# Patient Record
Sex: FEMALE | Race: BLACK OR AFRICAN AMERICAN | Employment: OTHER | URBAN - METROPOLITAN AREA
[De-identification: names, ages, dates, MRNs, and addresses within clinical notes are randomized per-mention and may not be internally consistent; named-entity substitution may affect disease eponyms.]

---

## 2018-01-01 ENCOUNTER — HOME CARE VISIT (OUTPATIENT)
Dept: HOSPICE | Facility: HOSPICE | Age: 83
End: 2018-01-01
Payer: MEDICARE

## 2018-01-01 ENCOUNTER — HOSPITAL ENCOUNTER (INPATIENT)
Age: 83
LOS: 5 days | Discharge: HOSPICE | DRG: 064 | End: 2018-10-20
Attending: EMERGENCY MEDICINE | Admitting: HOSPITALIST
Payer: MEDICARE

## 2018-01-01 ENCOUNTER — APPOINTMENT (OUTPATIENT)
Dept: CT IMAGING | Age: 83
DRG: 064 | End: 2018-01-01
Attending: PSYCHIATRY & NEUROLOGY
Payer: MEDICARE

## 2018-01-01 ENCOUNTER — APPOINTMENT (OUTPATIENT)
Dept: CT IMAGING | Age: 83
DRG: 329 | End: 2018-01-01
Attending: PSYCHIATRY & NEUROLOGY
Payer: MEDICARE

## 2018-01-01 ENCOUNTER — APPOINTMENT (OUTPATIENT)
Dept: CT IMAGING | Age: 83
DRG: 329 | End: 2018-01-01
Attending: INTERNAL MEDICINE
Payer: MEDICARE

## 2018-01-01 ENCOUNTER — APPOINTMENT (OUTPATIENT)
Dept: MRI IMAGING | Age: 83
DRG: 064 | End: 2018-01-01
Attending: PSYCHIATRY & NEUROLOGY
Payer: MEDICARE

## 2018-01-01 ENCOUNTER — ANESTHESIA EVENT (OUTPATIENT)
Dept: SURGERY | Age: 83
DRG: 329 | End: 2018-01-01
Payer: MEDICARE

## 2018-01-01 ENCOUNTER — APPOINTMENT (OUTPATIENT)
Dept: VASCULAR SURGERY | Age: 83
DRG: 064 | End: 2018-01-01
Attending: HOSPITALIST
Payer: MEDICARE

## 2018-01-01 ENCOUNTER — HOME CARE VISIT (OUTPATIENT)
Dept: SCHEDULING | Facility: HOME HEALTH | Age: 83
End: 2018-01-01
Payer: MEDICARE

## 2018-01-01 ENCOUNTER — APPOINTMENT (OUTPATIENT)
Dept: NON INVASIVE DIAGNOSTICS | Age: 83
DRG: 329 | End: 2018-01-01
Attending: INTERNAL MEDICINE
Payer: MEDICARE

## 2018-01-01 ENCOUNTER — HOSPITAL ENCOUNTER (INPATIENT)
Age: 83
LOS: 12 days | Discharge: NURSING, REHAB OR LONG-TERM CARE | DRG: 329 | End: 2018-10-12
Attending: EMERGENCY MEDICINE | Admitting: FAMILY MEDICINE
Payer: MEDICARE

## 2018-01-01 ENCOUNTER — APPOINTMENT (OUTPATIENT)
Dept: NUCLEAR MEDICINE | Age: 83
DRG: 329 | End: 2018-01-01
Attending: INTERNAL MEDICINE
Payer: MEDICARE

## 2018-01-01 ENCOUNTER — HOSPICE ADMISSION (OUTPATIENT)
Dept: HOSPICE | Facility: HOSPICE | Age: 83
End: 2018-01-01
Payer: MEDICARE

## 2018-01-01 ENCOUNTER — APPOINTMENT (OUTPATIENT)
Dept: CT IMAGING | Age: 83
DRG: 329 | End: 2018-01-01
Attending: PHYSICIAN ASSISTANT
Payer: MEDICARE

## 2018-01-01 ENCOUNTER — APPOINTMENT (OUTPATIENT)
Dept: GENERAL RADIOLOGY | Age: 83
DRG: 329 | End: 2018-01-01
Attending: INTERNAL MEDICINE
Payer: MEDICARE

## 2018-01-01 ENCOUNTER — APPOINTMENT (OUTPATIENT)
Dept: CT IMAGING | Age: 83
DRG: 064 | End: 2018-01-01
Attending: PHYSICIAN ASSISTANT
Payer: MEDICARE

## 2018-01-01 ENCOUNTER — APPOINTMENT (OUTPATIENT)
Dept: GENERAL RADIOLOGY | Age: 83
DRG: 064 | End: 2018-01-01
Attending: EMERGENCY MEDICINE
Payer: MEDICARE

## 2018-01-01 ENCOUNTER — APPOINTMENT (OUTPATIENT)
Dept: MRI IMAGING | Age: 83
DRG: 329 | End: 2018-01-01
Attending: PSYCHIATRY & NEUROLOGY
Payer: MEDICARE

## 2018-01-01 ENCOUNTER — ANESTHESIA (OUTPATIENT)
Dept: SURGERY | Age: 83
DRG: 329 | End: 2018-01-01
Payer: MEDICARE

## 2018-01-01 ENCOUNTER — APPOINTMENT (OUTPATIENT)
Dept: GENERAL RADIOLOGY | Age: 83
DRG: 329 | End: 2018-01-01
Attending: EMERGENCY MEDICINE
Payer: MEDICARE

## 2018-01-01 ENCOUNTER — APPOINTMENT (OUTPATIENT)
Dept: NON INVASIVE DIAGNOSTICS | Age: 83
DRG: 329 | End: 2018-01-01
Attending: PSYCHIATRY & NEUROLOGY
Payer: MEDICARE

## 2018-01-01 ENCOUNTER — HOSPITAL ENCOUNTER (EMERGENCY)
Dept: LAB | Age: 83
Discharge: HOME | DRG: 064 | End: 2018-10-15
Payer: MEDICARE

## 2018-01-01 ENCOUNTER — APPOINTMENT (OUTPATIENT)
Dept: CT IMAGING | Age: 83
DRG: 064 | End: 2018-01-01
Attending: EMERGENCY MEDICINE
Payer: MEDICARE

## 2018-01-01 ENCOUNTER — APPOINTMENT (OUTPATIENT)
Dept: NON INVASIVE DIAGNOSTICS | Age: 83
DRG: 064 | End: 2018-01-01
Attending: HOSPITALIST
Payer: MEDICARE

## 2018-01-01 VITALS
DIASTOLIC BLOOD PRESSURE: 64 MMHG | TEMPERATURE: 97.8 F | HEIGHT: 64 IN | WEIGHT: 215 LBS | OXYGEN SATURATION: 95 % | RESPIRATION RATE: 18 BRPM | BODY MASS INDEX: 36.7 KG/M2 | SYSTOLIC BLOOD PRESSURE: 95 MMHG | HEART RATE: 94 BPM

## 2018-01-01 VITALS
BODY MASS INDEX: 37.58 KG/M2 | DIASTOLIC BLOOD PRESSURE: 63 MMHG | HEART RATE: 86 BPM | WEIGHT: 204.2 LBS | RESPIRATION RATE: 16 BRPM | HEIGHT: 62 IN | SYSTOLIC BLOOD PRESSURE: 105 MMHG | TEMPERATURE: 99.1 F | OXYGEN SATURATION: 100 %

## 2018-01-01 VITALS
DIASTOLIC BLOOD PRESSURE: 72 MMHG | HEART RATE: 91 BPM | OXYGEN SATURATION: 84 % | SYSTOLIC BLOOD PRESSURE: 124 MMHG | RESPIRATION RATE: 28 BRPM

## 2018-01-01 VITALS
HEART RATE: 95 BPM | OXYGEN SATURATION: 99 % | RESPIRATION RATE: 16 BRPM | DIASTOLIC BLOOD PRESSURE: 61 MMHG | BODY MASS INDEX: 37.42 KG/M2 | HEIGHT: 63 IN | TEMPERATURE: 98.2 F | WEIGHT: 211.2 LBS | SYSTOLIC BLOOD PRESSURE: 107 MMHG

## 2018-01-01 VITALS — HEART RATE: 94 BPM | OXYGEN SATURATION: 84 % | RESPIRATION RATE: 22 BRPM | TEMPERATURE: 98.4 F

## 2018-01-01 VITALS
HEART RATE: 126 BPM | DIASTOLIC BLOOD PRESSURE: 68 MMHG | RESPIRATION RATE: 28 BRPM | SYSTOLIC BLOOD PRESSURE: 100 MMHG | OXYGEN SATURATION: 69 %

## 2018-01-01 VITALS
HEART RATE: 134 BPM | OXYGEN SATURATION: 67 % | DIASTOLIC BLOOD PRESSURE: 70 MMHG | TEMPERATURE: 97.5 F | SYSTOLIC BLOOD PRESSURE: 95 MMHG | RESPIRATION RATE: 25 BRPM

## 2018-01-01 DIAGNOSIS — I63.9 CEREBROVASCULAR ACCIDENT (CVA), UNSPECIFIED MECHANISM (HCC): Primary | ICD-10-CM

## 2018-01-01 DIAGNOSIS — R53.1 WEAKNESS: ICD-10-CM

## 2018-01-01 DIAGNOSIS — K63.89 COLONIC MASS: ICD-10-CM

## 2018-01-01 DIAGNOSIS — R47.01 APHASIA: ICD-10-CM

## 2018-01-01 LAB
ABO + RH BLD: NORMAL
ABO + RH BLD: NORMAL
ALBUMIN SERPL-MCNC: 2.2 G/DL (ref 3.4–5)
ALBUMIN SERPL-MCNC: 2.5 G/DL (ref 3.4–5)
ALBUMIN SERPL-MCNC: 2.6 G/DL (ref 3.4–5)
ALBUMIN SERPL-MCNC: 2.8 G/DL (ref 3.4–5)
ALBUMIN SERPL-MCNC: 3.5 G/DL (ref 3.4–5)
ALBUMIN/GLOB SERPL: 0.7 {RATIO} (ref 0.8–1.7)
ALBUMIN/GLOB SERPL: 0.8 {RATIO} (ref 0.8–1.7)
ALBUMIN/GLOB SERPL: 0.9 {RATIO} (ref 0.8–1.7)
ALP SERPL-CCNC: 43 U/L (ref 45–117)
ALP SERPL-CCNC: 47 U/L (ref 45–117)
ALP SERPL-CCNC: 49 U/L (ref 45–117)
ALP SERPL-CCNC: 53 U/L (ref 45–117)
ALP SERPL-CCNC: 61 U/L (ref 45–117)
ALT SERPL-CCNC: 14 U/L (ref 13–56)
ALT SERPL-CCNC: 14 U/L (ref 13–56)
ALT SERPL-CCNC: 16 U/L (ref 13–56)
ALT SERPL-CCNC: 21 U/L (ref 13–56)
ALT SERPL-CCNC: 22 U/L (ref 13–56)
ANION GAP SERPL CALC-SCNC: 10 MMOL/L (ref 3–18)
ANION GAP SERPL CALC-SCNC: 3 MMOL/L (ref 3–18)
ANION GAP SERPL CALC-SCNC: 5 MMOL/L (ref 3–18)
ANION GAP SERPL CALC-SCNC: 5 MMOL/L (ref 3–18)
ANION GAP SERPL CALC-SCNC: 6 MMOL/L (ref 3–18)
ANION GAP SERPL CALC-SCNC: 7 MMOL/L (ref 3–18)
ANION GAP SERPL CALC-SCNC: 8 MMOL/L (ref 3–18)
ANION GAP SERPL CALC-SCNC: 8 MMOL/L (ref 3–18)
APPEARANCE UR: CLEAR
APPEARANCE UR: CLEAR
APTT PPP: 130.6 SEC (ref 23–36.4)
APTT PPP: 30.1 SEC (ref 23–36.4)
APTT PPP: 35.4 SEC (ref 23–36.4)
APTT PPP: 78.3 SEC (ref 23–36.4)
APTT PPP: >180 SEC (ref 23–36.4)
ARTERIAL PATENCY WRIST A: ABNORMAL
ARTERIAL PATENCY WRIST A: YES
AST SERPL-CCNC: 10 U/L (ref 15–37)
AST SERPL-CCNC: 11 U/L (ref 15–37)
AST SERPL-CCNC: 26 U/L (ref 15–37)
AST SERPL-CCNC: 30 U/L (ref 15–37)
AST SERPL-CCNC: 9 U/L (ref 15–37)
BACTERIA SPEC CULT: NORMAL
BACTERIA URNS QL MICRO: ABNORMAL /HPF
BASE EXCESS BLD CALC-SCNC: 5 MMOL/L
BASE EXCESS BLD CALC-SCNC: 6 MMOL/L
BASOPHILS # BLD: 0 K/UL (ref 0–0.1)
BASOPHILS # BLD: 0.1 K/UL (ref 0–0.1)
BASOPHILS NFR BLD: 0 % (ref 0–2)
BASOPHILS NFR BLD: 1 % (ref 0–3)
BDY SITE: ABNORMAL
BDY SITE: ABNORMAL
BILIRUB SERPL-MCNC: 0.3 MG/DL (ref 0.2–1)
BILIRUB SERPL-MCNC: 0.4 MG/DL (ref 0.2–1)
BILIRUB SERPL-MCNC: 0.5 MG/DL (ref 0.2–1)
BILIRUB SERPL-MCNC: 0.9 MG/DL (ref 0.2–1)
BILIRUB SERPL-MCNC: 1.4 MG/DL (ref 0.2–1)
BILIRUB UR QL: NEGATIVE
BILIRUB UR QL: NEGATIVE
BLD PROD TYP BPU: NORMAL
BLOOD GROUP ANTIBODIES SERPL: NORMAL
BLOOD GROUP ANTIBODIES SERPL: NORMAL
BODY TEMPERATURE: 97
BODY TEMPERATURE: 98.4
BPU ID: NORMAL
BUN SERPL-MCNC: 10 MG/DL (ref 7–18)
BUN SERPL-MCNC: 10 MG/DL (ref 7–18)
BUN SERPL-MCNC: 11 MG/DL (ref 7–18)
BUN SERPL-MCNC: 12 MG/DL (ref 7–18)
BUN SERPL-MCNC: 14 MG/DL (ref 7–18)
BUN SERPL-MCNC: 14 MG/DL (ref 7–18)
BUN SERPL-MCNC: 16 MG/DL (ref 7–18)
BUN SERPL-MCNC: 17 MG/DL (ref 7–18)
BUN SERPL-MCNC: 20 MG/DL (ref 7–18)
BUN SERPL-MCNC: 22 MG/DL (ref 7–18)
BUN SERPL-MCNC: 23 MG/DL (ref 7–18)
BUN SERPL-MCNC: 8 MG/DL (ref 7–18)
BUN SERPL-MCNC: 9 MG/DL (ref 7–18)
BUN SERPL-MCNC: 9 MG/DL (ref 7–18)
BUN/CREAT SERPL: 10 (ref 12–20)
BUN/CREAT SERPL: 11 (ref 12–20)
BUN/CREAT SERPL: 12 (ref 12–20)
BUN/CREAT SERPL: 13 (ref 12–20)
BUN/CREAT SERPL: 14 (ref 12–20)
BUN/CREAT SERPL: 15 (ref 12–20)
BUN/CREAT SERPL: 7 (ref 12–20)
BUN/CREAT SERPL: 8 (ref 12–20)
BUN/CREAT SERPL: 9 (ref 12–20)
BUN/CREAT SERPL: 9 (ref 12–20)
CALCIUM SERPL-MCNC: 8.1 MG/DL (ref 8.5–10.1)
CALCIUM SERPL-MCNC: 8.2 MG/DL (ref 8.5–10.1)
CALCIUM SERPL-MCNC: 8.3 MG/DL (ref 8.5–10.1)
CALCIUM SERPL-MCNC: 8.4 MG/DL (ref 8.5–10.1)
CALCIUM SERPL-MCNC: 8.5 MG/DL (ref 8.5–10.1)
CALCIUM SERPL-MCNC: 8.8 MG/DL (ref 8.5–10.1)
CALCIUM SERPL-MCNC: 8.9 MG/DL (ref 8.5–10.1)
CALLED TO:,BCALL1: NORMAL
CALLED TO:,BCALL1: NORMAL
CEA SERPL-MCNC: 2 NG/ML
CHLORIDE SERPL-SCNC: 104 MMOL/L (ref 100–108)
CHLORIDE SERPL-SCNC: 105 MMOL/L (ref 100–108)
CHLORIDE SERPL-SCNC: 105 MMOL/L (ref 100–108)
CHLORIDE SERPL-SCNC: 106 MMOL/L (ref 100–108)
CHLORIDE SERPL-SCNC: 107 MMOL/L (ref 100–108)
CHLORIDE SERPL-SCNC: 108 MMOL/L (ref 100–108)
CHLORIDE SERPL-SCNC: 110 MMOL/L (ref 100–108)
CHLORIDE SERPL-SCNC: 110 MMOL/L (ref 100–108)
CHLORIDE SERPL-SCNC: 111 MMOL/L (ref 100–108)
CHLORIDE SERPL-SCNC: 111 MMOL/L (ref 100–108)
CHOLEST SERPL-MCNC: 106 MG/DL
CHOLEST SERPL-MCNC: 175 MG/DL
CK MB CFR SERPL CALC: NORMAL % (ref 0–4)
CK MB CFR SERPL CALC: NORMAL % (ref 0–4)
CK MB SERPL-MCNC: <1 NG/ML (ref 5–25)
CK MB SERPL-MCNC: <1 NG/ML (ref 5–25)
CK SERPL-CCNC: 37 U/L (ref 26–192)
CK SERPL-CCNC: 58 U/L (ref 26–192)
CO2 SERPL-SCNC: 23 MMOL/L (ref 21–32)
CO2 SERPL-SCNC: 26 MMOL/L (ref 21–32)
CO2 SERPL-SCNC: 27 MMOL/L (ref 21–32)
CO2 SERPL-SCNC: 28 MMOL/L (ref 21–32)
CO2 SERPL-SCNC: 29 MMOL/L (ref 21–32)
CO2 SERPL-SCNC: 29 MMOL/L (ref 21–32)
CO2 SERPL-SCNC: 30 MMOL/L (ref 21–32)
CO2 SERPL-SCNC: 31 MMOL/L (ref 21–32)
CO2 SERPL-SCNC: 31 MMOL/L (ref 21–32)
CO2 SERPL-SCNC: 32 MMOL/L (ref 21–32)
CO2 SERPL-SCNC: 33 MMOL/L (ref 21–32)
COLOR UR: YELLOW
COLOR UR: YELLOW
CREAT SERPL-MCNC: 1.08 MG/DL (ref 0.6–1.3)
CREAT SERPL-MCNC: 1.12 MG/DL (ref 0.6–1.3)
CREAT SERPL-MCNC: 1.12 MG/DL (ref 0.6–1.3)
CREAT SERPL-MCNC: 1.14 MG/DL (ref 0.6–1.3)
CREAT SERPL-MCNC: 1.15 MG/DL (ref 0.6–1.3)
CREAT SERPL-MCNC: 1.19 MG/DL (ref 0.6–1.3)
CREAT SERPL-MCNC: 1.21 MG/DL (ref 0.6–1.3)
CREAT SERPL-MCNC: 1.23 MG/DL (ref 0.6–1.3)
CREAT SERPL-MCNC: 1.26 MG/DL (ref 0.6–1.3)
CREAT SERPL-MCNC: 1.29 MG/DL (ref 0.6–1.3)
CREAT SERPL-MCNC: 1.33 MG/DL (ref 0.6–1.3)
CREAT SERPL-MCNC: 1.35 MG/DL (ref 0.6–1.3)
CREAT SERPL-MCNC: 1.4 MG/DL (ref 0.6–1.3)
CREAT SERPL-MCNC: 1.41 MG/DL (ref 0.6–1.3)
CREAT SERPL-MCNC: 1.51 MG/DL (ref 0.6–1.3)
CREAT SERPL-MCNC: 1.63 MG/DL (ref 0.6–1.3)
CROSSMATCH RESULT,%XM: NORMAL
DIFFERENTIAL METHOD BLD: ABNORMAL
ECHO AO ARCH DIAM: 3.25 CM
ECHO AO ARCH DIAM: 3.62 CM
ECHO AO ASC DIAM: 3.08 CM
ECHO AO ASC DIAM: 3.41 CM
ECHO AO ROOT DIAM: 3.2 CM
ECHO AO ROOT DIAM: 3.55 CM
ECHO AV AREA PEAK VELOCITY: 1.9 CM2
ECHO AV AREA PEAK VELOCITY: 2.2 CM2
ECHO AV AREA VTI: 2 CM2
ECHO AV AREA VTI: 2.2 CM2
ECHO AV AREA/BSA PEAK VELOCITY: 0.9 CM2/M2
ECHO AV AREA/BSA PEAK VELOCITY: 1.1 CM2/M2
ECHO AV AREA/BSA VTI: 1 CM2/M2
ECHO AV AREA/BSA VTI: 1.1 CM2/M2
ECHO AV MEAN GRADIENT: 11 MMHG
ECHO AV MEAN GRADIENT: 8.7 MMHG
ECHO AV PEAK GRADIENT: 17.1 MMHG
ECHO AV PEAK GRADIENT: 20.1 MMHG
ECHO AV PEAK VELOCITY: 206.84 CM/S
ECHO AV PEAK VELOCITY: 224.1 CM/S
ECHO AV VTI: 32.71 CM
ECHO AV VTI: 41.42 CM
ECHO IVC PROX: 2 CM
ECHO IVC PROX: 2.8 CM
ECHO LA MAJOR AXIS: 3.25 CM
ECHO LA MAJOR AXIS: 3.67 CM
ECHO LA VOL 2C: 27.87 ML (ref 22–52)
ECHO LA VOL 2C: 45.24 ML (ref 22–52)
ECHO LA VOL 4C: 44.02 ML (ref 22–52)
ECHO LA VOL 4C: 49.66 ML (ref 22–52)
ECHO LA VOL BP: 39.88 ML (ref 22–52)
ECHO LA VOL BP: 47.08 ML (ref 22–52)
ECHO LA VOL/BSA BIPLANE: 20.43 ML/M2 (ref 16–28)
ECHO LA VOL/BSA BIPLANE: 23.66 ML/M2
ECHO LA VOLUME INDEX A2C: 14.28 ML/M2 (ref 16–28)
ECHO LA VOLUME INDEX A2C: 22.73 ML/M2
ECHO LA VOLUME INDEX A4C: 22.12 ML/M2
ECHO LA VOLUME INDEX A4C: 25.45 ML/M2 (ref 16–28)
ECHO LV E' LATERAL VELOCITY: 0.7 CM/S
ECHO LV E' LATERAL VELOCITY: 1 CM/S
ECHO LV E' SEPTAL VELOCITY: 0.7 CM/S
ECHO LV E' SEPTAL VELOCITY: 0.7 CM/S
ECHO LV EDV A2C: 44 ML
ECHO LV EDV A2C: 84 ML
ECHO LV EDV A4C: 73.4 ML
ECHO LV EDV A4C: 78.5 ML
ECHO LV EDV BP: 61.5 ML (ref 56–104)
ECHO LV EDV BP: 79.1 ML (ref 56–104)
ECHO LV EDV INDEX A4C: 36.9 ML/M2
ECHO LV EDV INDEX A4C: 40.2 ML/M2
ECHO LV EDV INDEX BP: 31.5 ML/M2
ECHO LV EDV INDEX BP: 39.7 ML/M2
ECHO LV EDV NDEX A2C: 22.5 ML/M2
ECHO LV EDV NDEX A2C: 42.2 ML/M2
ECHO LV EJECTION FRACTION A2C: 68 %
ECHO LV EJECTION FRACTION A2C: 75 %
ECHO LV EJECTION FRACTION A4C: 57 %
ECHO LV EJECTION FRACTION A4C: 62 %
ECHO LV EJECTION FRACTION BIPLANE: 60 % (ref 55–100)
ECHO LV EJECTION FRACTION BIPLANE: 69.4 % (ref 55–100)
ECHO LV ESV A2C: 14.1 ML
ECHO LV ESV A2C: 21.3 ML
ECHO LV ESV A4C: 27.6 ML
ECHO LV ESV A4C: 33.7 ML
ECHO LV ESV BP: 24.2 ML (ref 19–49)
ECHO LV ESV BP: 24.6 ML (ref 19–49)
ECHO LV ESV INDEX A2C: 10.7 ML/M2
ECHO LV ESV INDEX A2C: 7.2 ML/M2
ECHO LV ESV INDEX A4C: 13.9 ML/M2
ECHO LV ESV INDEX A4C: 17.3 ML/M2
ECHO LV ESV INDEX BP: 12.2 ML/M2
ECHO LV ESV INDEX BP: 12.6 ML/M2
ECHO LV INTERNAL DIMENSION DIASTOLIC: 3.51 CM (ref 3.9–5.3)
ECHO LV INTERNAL DIMENSION DIASTOLIC: 4.13 CM (ref 3.9–5.3)
ECHO LV INTERNAL DIMENSION SYSTOLIC: 2.51 CM
ECHO LV INTERNAL DIMENSION SYSTOLIC: 2.68 CM
ECHO LV IVSD: 1.04 CM (ref 0.6–0.9)
ECHO LV IVSD: 1.17 CM (ref 0.6–0.9)
ECHO LV MASS 2D: 150.3 G (ref 67–162)
ECHO LV MASS 2D: 162.5 G (ref 67–162)
ECHO LV MASS INDEX 2D: 75.5 G/M2
ECHO LV MASS INDEX 2D: 83.3 G/M2 (ref 43–95)
ECHO LV POSTERIOR WALL DIASTOLIC: 1.04 CM (ref 0.6–0.9)
ECHO LV POSTERIOR WALL DIASTOLIC: 1.17 CM (ref 0.6–0.9)
ECHO LVOT DIAM: 2.02 CM
ECHO LVOT DIAM: 2.1 CM
ECHO LVOT PEAK GRADIENT: 6.2 MMHG
ECHO LVOT PEAK GRADIENT: 8.3 MMHG
ECHO LVOT PEAK VELOCITY: 124.66 CM/S
ECHO LVOT PEAK VELOCITY: 144.34 CM/S
ECHO LVOT VTI: 20.53 CM
ECHO LVOT VTI: 26.22 CM
ECHO MV A VELOCITY: 117.12 CM/S
ECHO MV A VELOCITY: 99.32 CM/S
ECHO MV AREA PHT: 2.5 CM2
ECHO MV AREA PHT: 2.6 CM2
ECHO MV E DECELERATION TIME (DT): 287.4 MS
ECHO MV E DECELERATION TIME (DT): 302.4 MS
ECHO MV E VELOCITY: 0.64 CM/S
ECHO MV E VELOCITY: 0.87 CM/S
ECHO MV E/A RATIO: 0.01
ECHO MV E/A RATIO: 0.01
ECHO MV E/E' LATERAL: 0.64
ECHO MV E/E' LATERAL: 1.24
ECHO MV E/E' RATIO (AVERAGED): 0.78
ECHO MV E/E' RATIO (AVERAGED): 1.24
ECHO MV E/E' SEPTAL: 0.91
ECHO MV E/E' SEPTAL: 1.24
ECHO MV PRESSURE HALF TIME (PHT): 83.4 MS
ECHO MV PRESSURE HALF TIME (PHT): 87.7 MS
ECHO PULMONARY ARTERY SYSTOLIC PRESSURE (PASP): 64 MMHG
ECHO RA AREA 4C: 11.39 CM2
ECHO RA AREA 4C: 8.2 CM2
ECHO RV INTERNAL DIMENSION: 4.07 CM
ECHO RV INTERNAL DIMENSION: 4.28 CM
ECHO RV TAPSE: 2 CM (ref 1.5–2)
ECHO RV TAPSE: 2.2 CM (ref 1.5–2)
ECHO TV REGURGITANT MAX VELOCITY: 282.29 CM/S
ECHO TV REGURGITANT MAX VELOCITY: 384.89 CM/S
ECHO TV REGURGITANT PEAK GRADIENT: 31.9 MMHG
ECHO TV REGURGITANT PEAK GRADIENT: 59.3 MMHG
EOSINOPHIL # BLD: 0 K/UL (ref 0–0.4)
EOSINOPHIL # BLD: 0.1 K/UL (ref 0–0.4)
EOSINOPHIL # BLD: 0.2 K/UL (ref 0–0.4)
EOSINOPHIL NFR BLD: 0 % (ref 0–5)
EOSINOPHIL NFR BLD: 1 % (ref 0–5)
EOSINOPHIL NFR BLD: 1 % (ref 0–5)
EOSINOPHIL NFR BLD: 2 % (ref 0–5)
EOSINOPHIL NFR BLD: 3 % (ref 0–5)
EOSINOPHIL NFR BLD: 4 % (ref 0–5)
EOSINOPHIL NFR BLD: 4 % (ref 0–5)
EPITH CASTS URNS QL MICRO: ABNORMAL /LPF (ref 0–5)
ERYTHROCYTE [DISTWIDTH] IN BLOOD BY AUTOMATED COUNT: 21.4 % (ref 11.6–14.5)
ERYTHROCYTE [DISTWIDTH] IN BLOOD BY AUTOMATED COUNT: 24.9 % (ref 11.6–14.5)
ERYTHROCYTE [DISTWIDTH] IN BLOOD BY AUTOMATED COUNT: 25.7 % (ref 11.6–14.5)
ERYTHROCYTE [DISTWIDTH] IN BLOOD BY AUTOMATED COUNT: 25.9 % (ref 11.6–14.5)
ERYTHROCYTE [DISTWIDTH] IN BLOOD BY AUTOMATED COUNT: 26.1 % (ref 11.6–14.5)
ERYTHROCYTE [DISTWIDTH] IN BLOOD BY AUTOMATED COUNT: 26.2 % (ref 11.6–14.5)
ERYTHROCYTE [DISTWIDTH] IN BLOOD BY AUTOMATED COUNT: 26.8 % (ref 11.6–14.5)
ERYTHROCYTE [DISTWIDTH] IN BLOOD BY AUTOMATED COUNT: 27.1 % (ref 11.6–14.5)
ERYTHROCYTE [DISTWIDTH] IN BLOOD BY AUTOMATED COUNT: 27.2 % (ref 11.6–14.5)
ERYTHROCYTE [DISTWIDTH] IN BLOOD BY AUTOMATED COUNT: 27.3 % (ref 11.6–14.5)
ERYTHROCYTE [DISTWIDTH] IN BLOOD BY AUTOMATED COUNT: 27.9 % (ref 11.6–14.5)
ERYTHROCYTE [DISTWIDTH] IN BLOOD BY AUTOMATED COUNT: 28.3 % (ref 11.6–14.5)
ERYTHROCYTE [DISTWIDTH] IN BLOOD BY AUTOMATED COUNT: 28.3 % (ref 11.6–14.5)
ERYTHROCYTE [DISTWIDTH] IN BLOOD BY AUTOMATED COUNT: 28.5 % (ref 11.6–14.5)
ERYTHROCYTE [DISTWIDTH] IN BLOOD BY AUTOMATED COUNT: 28.7 % (ref 11.6–14.5)
ERYTHROCYTE [DISTWIDTH] IN BLOOD BY AUTOMATED COUNT: 28.7 % (ref 11.6–14.5)
ERYTHROCYTE [DISTWIDTH] IN BLOOD BY AUTOMATED COUNT: 28.8 % (ref 11.6–14.5)
EST. AVERAGE GLUCOSE BLD GHB EST-MCNC: 111 MG/DL
EST. AVERAGE GLUCOSE BLD GHB EST-MCNC: 120 MG/DL
FERRITIN SERPL-MCNC: 5 NG/ML (ref 8–388)
FOLATE SERPL-MCNC: 12.9 NG/ML (ref 3.1–17.5)
GAS FLOW.O2 O2 DELIVERY SYS: ABNORMAL L/MIN
GAS FLOW.O2 O2 DELIVERY SYS: ABNORMAL L/MIN
GAS FLOW.O2 SETTING OXYMISER: 14 BPM
GAS FLOW.O2 SETTING OXYMISER: 14 BPM
GLOBULIN SER CALC-MCNC: 3.2 G/DL (ref 2–4)
GLOBULIN SER CALC-MCNC: 3.3 G/DL (ref 2–4)
GLOBULIN SER CALC-MCNC: 3.3 G/DL (ref 2–4)
GLOBULIN SER CALC-MCNC: 3.5 G/DL (ref 2–4)
GLOBULIN SER CALC-MCNC: 4.1 G/DL (ref 2–4)
GLUCOSE BLD STRIP.AUTO-MCNC: 104 MG/DL (ref 70–110)
GLUCOSE BLD STRIP.AUTO-MCNC: 104 MG/DL (ref 70–110)
GLUCOSE BLD STRIP.AUTO-MCNC: 105 MG/DL (ref 70–110)
GLUCOSE BLD STRIP.AUTO-MCNC: 110 MG/DL (ref 70–110)
GLUCOSE BLD STRIP.AUTO-MCNC: 113 MG/DL (ref 70–110)
GLUCOSE BLD STRIP.AUTO-MCNC: 115 MG/DL (ref 70–110)
GLUCOSE BLD STRIP.AUTO-MCNC: 116 MG/DL (ref 70–110)
GLUCOSE BLD STRIP.AUTO-MCNC: 121 MG/DL (ref 70–110)
GLUCOSE BLD STRIP.AUTO-MCNC: 121 MG/DL (ref 70–110)
GLUCOSE BLD STRIP.AUTO-MCNC: 122 MG/DL (ref 70–110)
GLUCOSE BLD STRIP.AUTO-MCNC: 123 MG/DL (ref 70–110)
GLUCOSE BLD STRIP.AUTO-MCNC: 124 MG/DL (ref 70–110)
GLUCOSE BLD STRIP.AUTO-MCNC: 124 MG/DL (ref 70–110)
GLUCOSE BLD STRIP.AUTO-MCNC: 125 MG/DL (ref 70–110)
GLUCOSE BLD STRIP.AUTO-MCNC: 125 MG/DL (ref 70–110)
GLUCOSE BLD STRIP.AUTO-MCNC: 128 MG/DL (ref 70–110)
GLUCOSE BLD STRIP.AUTO-MCNC: 128 MG/DL (ref 70–110)
GLUCOSE BLD STRIP.AUTO-MCNC: 129 MG/DL (ref 70–110)
GLUCOSE BLD STRIP.AUTO-MCNC: 130 MG/DL (ref 70–110)
GLUCOSE BLD STRIP.AUTO-MCNC: 131 MG/DL (ref 70–110)
GLUCOSE BLD STRIP.AUTO-MCNC: 132 MG/DL (ref 70–110)
GLUCOSE BLD STRIP.AUTO-MCNC: 132 MG/DL (ref 70–110)
GLUCOSE BLD STRIP.AUTO-MCNC: 134 MG/DL (ref 70–110)
GLUCOSE BLD STRIP.AUTO-MCNC: 138 MG/DL (ref 70–110)
GLUCOSE BLD STRIP.AUTO-MCNC: 140 MG/DL (ref 70–110)
GLUCOSE BLD STRIP.AUTO-MCNC: 140 MG/DL (ref 70–110)
GLUCOSE BLD STRIP.AUTO-MCNC: 142 MG/DL (ref 70–110)
GLUCOSE BLD STRIP.AUTO-MCNC: 144 MG/DL (ref 70–110)
GLUCOSE BLD STRIP.AUTO-MCNC: 144 MG/DL (ref 70–110)
GLUCOSE BLD STRIP.AUTO-MCNC: 145 MG/DL (ref 70–110)
GLUCOSE BLD STRIP.AUTO-MCNC: 146 MG/DL (ref 70–110)
GLUCOSE BLD STRIP.AUTO-MCNC: 146 MG/DL (ref 70–110)
GLUCOSE BLD STRIP.AUTO-MCNC: 148 MG/DL (ref 70–110)
GLUCOSE BLD STRIP.AUTO-MCNC: 149 MG/DL (ref 70–110)
GLUCOSE BLD STRIP.AUTO-MCNC: 150 MG/DL (ref 70–110)
GLUCOSE BLD STRIP.AUTO-MCNC: 150 MG/DL (ref 70–110)
GLUCOSE BLD STRIP.AUTO-MCNC: 153 MG/DL (ref 70–110)
GLUCOSE BLD STRIP.AUTO-MCNC: 154 MG/DL (ref 70–110)
GLUCOSE BLD STRIP.AUTO-MCNC: 156 MG/DL (ref 70–110)
GLUCOSE BLD STRIP.AUTO-MCNC: 159 MG/DL (ref 70–110)
GLUCOSE BLD STRIP.AUTO-MCNC: 159 MG/DL (ref 70–110)
GLUCOSE BLD STRIP.AUTO-MCNC: 161 MG/DL (ref 70–110)
GLUCOSE BLD STRIP.AUTO-MCNC: 161 MG/DL (ref 70–110)
GLUCOSE BLD STRIP.AUTO-MCNC: 162 MG/DL (ref 70–110)
GLUCOSE BLD STRIP.AUTO-MCNC: 163 MG/DL (ref 70–110)
GLUCOSE BLD STRIP.AUTO-MCNC: 163 MG/DL (ref 70–110)
GLUCOSE BLD STRIP.AUTO-MCNC: 165 MG/DL (ref 70–110)
GLUCOSE BLD STRIP.AUTO-MCNC: 170 MG/DL (ref 70–110)
GLUCOSE BLD STRIP.AUTO-MCNC: 176 MG/DL (ref 70–110)
GLUCOSE BLD STRIP.AUTO-MCNC: 176 MG/DL (ref 70–110)
GLUCOSE BLD STRIP.AUTO-MCNC: 179 MG/DL (ref 70–110)
GLUCOSE BLD STRIP.AUTO-MCNC: 179 MG/DL (ref 70–110)
GLUCOSE BLD STRIP.AUTO-MCNC: 187 MG/DL (ref 70–110)
GLUCOSE BLD STRIP.AUTO-MCNC: 198 MG/DL (ref 70–110)
GLUCOSE BLD STRIP.AUTO-MCNC: 204 MG/DL (ref 70–110)
GLUCOSE BLD STRIP.AUTO-MCNC: 211 MG/DL (ref 70–110)
GLUCOSE BLD STRIP.AUTO-MCNC: 211 MG/DL (ref 70–110)
GLUCOSE BLD STRIP.AUTO-MCNC: 215 MG/DL (ref 70–110)
GLUCOSE BLD STRIP.AUTO-MCNC: 244 MG/DL (ref 70–110)
GLUCOSE BLD STRIP.AUTO-MCNC: 335 MG/DL (ref 70–110)
GLUCOSE BLD STRIP.AUTO-MCNC: 62 MG/DL (ref 70–110)
GLUCOSE BLD STRIP.AUTO-MCNC: 76 MG/DL (ref 70–110)
GLUCOSE BLD STRIP.AUTO-MCNC: 88 MG/DL (ref 70–110)
GLUCOSE BLD STRIP.AUTO-MCNC: 98 MG/DL (ref 70–110)
GLUCOSE SERPL-MCNC: 106 MG/DL (ref 74–99)
GLUCOSE SERPL-MCNC: 107 MG/DL (ref 74–99)
GLUCOSE SERPL-MCNC: 112 MG/DL (ref 74–99)
GLUCOSE SERPL-MCNC: 113 MG/DL (ref 74–99)
GLUCOSE SERPL-MCNC: 118 MG/DL (ref 74–99)
GLUCOSE SERPL-MCNC: 119 MG/DL (ref 74–99)
GLUCOSE SERPL-MCNC: 121 MG/DL (ref 74–99)
GLUCOSE SERPL-MCNC: 126 MG/DL (ref 74–99)
GLUCOSE SERPL-MCNC: 132 MG/DL (ref 74–99)
GLUCOSE SERPL-MCNC: 132 MG/DL (ref 74–99)
GLUCOSE SERPL-MCNC: 153 MG/DL (ref 74–99)
GLUCOSE SERPL-MCNC: 181 MG/DL (ref 74–99)
GLUCOSE SERPL-MCNC: 227 MG/DL (ref 74–99)
GLUCOSE SERPL-MCNC: 283 MG/DL (ref 74–99)
GLUCOSE SERPL-MCNC: 97 MG/DL (ref 74–99)
GLUCOSE SERPL-MCNC: 98 MG/DL (ref 74–99)
GLUCOSE UR STRIP.AUTO-MCNC: NEGATIVE MG/DL
GLUCOSE UR STRIP.AUTO-MCNC: NEGATIVE MG/DL
HBA1C MFR BLD: 5.5 % (ref 4.5–5.6)
HBA1C MFR BLD: 5.8 % (ref 4.5–5.6)
HCO3 BLD-SCNC: 29.2 MMOL/L (ref 22–26)
HCO3 BLD-SCNC: 29.3 MMOL/L (ref 22–26)
HCT VFR BLD AUTO: 19.5 % (ref 35–45)
HCT VFR BLD AUTO: 22.6 % (ref 35–45)
HCT VFR BLD AUTO: 24.4 % (ref 35–45)
HCT VFR BLD AUTO: 24.8 % (ref 35–45)
HCT VFR BLD AUTO: 25.3 % (ref 35–45)
HCT VFR BLD AUTO: 25.9 % (ref 35–45)
HCT VFR BLD AUTO: 26 % (ref 35–45)
HCT VFR BLD AUTO: 26.1 % (ref 35–45)
HCT VFR BLD AUTO: 26.2 % (ref 35–45)
HCT VFR BLD AUTO: 26.4 % (ref 35–45)
HCT VFR BLD AUTO: 26.7 % (ref 35–45)
HCT VFR BLD AUTO: 26.9 % (ref 35–45)
HCT VFR BLD AUTO: 27.1 % (ref 35–45)
HCT VFR BLD AUTO: 27.3 % (ref 35–45)
HCT VFR BLD AUTO: 27.3 % (ref 35–45)
HCT VFR BLD AUTO: 28.7 % (ref 35–45)
HCT VFR BLD AUTO: 29.5 % (ref 35–45)
HCT VFR BLD AUTO: 31.6 % (ref 35–45)
HCT VFR BLD AUTO: 31.6 % (ref 35–45)
HDLC SERPL-MCNC: 32 MG/DL (ref 40–60)
HDLC SERPL-MCNC: 81 MG/DL (ref 40–60)
HDLC SERPL: 2.2 {RATIO} (ref 0–5)
HDLC SERPL: 3.3 {RATIO} (ref 0–5)
HGB BLD-MCNC: 5.5 G/DL (ref 12–16)
HGB BLD-MCNC: 6.1 G/DL (ref 12–16)
HGB BLD-MCNC: 6.9 G/DL (ref 12–16)
HGB BLD-MCNC: 7 G/DL (ref 12–16)
HGB BLD-MCNC: 7.3 G/DL (ref 12–16)
HGB BLD-MCNC: 7.4 G/DL (ref 12–16)
HGB BLD-MCNC: 7.5 G/DL (ref 12–16)
HGB BLD-MCNC: 7.7 G/DL (ref 12–16)
HGB BLD-MCNC: 7.7 G/DL (ref 12–16)
HGB BLD-MCNC: 7.8 G/DL (ref 12–16)
HGB BLD-MCNC: 7.9 G/DL (ref 12–16)
HGB BLD-MCNC: 8.4 G/DL (ref 12–16)
HGB BLD-MCNC: 8.7 G/DL (ref 12–16)
HGB BLD-MCNC: 9.3 G/DL (ref 12–16)
HGB BLD-MCNC: 9.4 G/DL (ref 12–16)
HGB UR QL STRIP: NEGATIVE
HGB UR QL STRIP: NEGATIVE
INR PPP: 1 (ref 0.8–1.2)
INR PPP: 1.1 (ref 0.8–1.2)
INR PPP: 1.2 (ref 0.8–1.2)
INR PPP: 1.5 (ref 0.8–1.2)
INSPIRATION.DURATION SETTING TIME VENT: 1 SEC
INSPIRATION.DURATION SETTING TIME VENT: 1 SEC
IRON SATN MFR SERPL: 15 %
IRON SATN MFR SERPL: 3 %
IRON SERPL-MCNC: 12 UG/DL (ref 50–175)
IRON SERPL-MCNC: 61 UG/DL (ref 50–175)
KETONES UR QL STRIP.AUTO: NEGATIVE MG/DL
KETONES UR QL STRIP.AUTO: NEGATIVE MG/DL
LACTATE SERPL-SCNC: 1.2 MMOL/L (ref 0.4–2)
LDLC SERPL CALC-MCNC: 50.6 MG/DL (ref 0–100)
LDLC SERPL CALC-MCNC: 70 MG/DL (ref 0–100)
LEUKOCYTE ESTERASE UR QL STRIP.AUTO: NEGATIVE
LEUKOCYTE ESTERASE UR QL STRIP.AUTO: NEGATIVE
LIPID PROFILE,FLP: ABNORMAL
LIPID PROFILE,FLP: ABNORMAL
LYMPHOCYTES # BLD: 0.9 K/UL (ref 0.8–3.5)
LYMPHOCYTES # BLD: 1 K/UL (ref 0.9–3.6)
LYMPHOCYTES # BLD: 1.1 K/UL (ref 0.9–3.6)
LYMPHOCYTES # BLD: 1.2 K/UL (ref 0.9–3.6)
LYMPHOCYTES # BLD: 1.4 K/UL (ref 0.9–3.6)
LYMPHOCYTES NFR BLD: 11 % (ref 21–52)
LYMPHOCYTES NFR BLD: 14 % (ref 21–52)
LYMPHOCYTES NFR BLD: 15 % (ref 20–51)
LYMPHOCYTES NFR BLD: 15 % (ref 21–52)
LYMPHOCYTES NFR BLD: 18 % (ref 21–52)
LYMPHOCYTES NFR BLD: 18 % (ref 21–52)
LYMPHOCYTES NFR BLD: 20 % (ref 21–52)
LYMPHOCYTES NFR BLD: 21 % (ref 21–52)
LYMPHOCYTES NFR BLD: 22 % (ref 21–52)
MCH RBC QN AUTO: 17 PG (ref 24–34)
MCH RBC QN AUTO: 19.8 PG (ref 24–34)
MCH RBC QN AUTO: 19.8 PG (ref 24–34)
MCH RBC QN AUTO: 20.1 PG (ref 24–34)
MCH RBC QN AUTO: 20.1 PG (ref 24–34)
MCH RBC QN AUTO: 20.3 PG (ref 24–34)
MCH RBC QN AUTO: 20.8 PG (ref 24–34)
MCH RBC QN AUTO: 20.9 PG (ref 24–34)
MCH RBC QN AUTO: 21.1 PG (ref 24–34)
MCH RBC QN AUTO: 21.3 PG (ref 24–34)
MCH RBC QN AUTO: 21.4 PG (ref 24–34)
MCH RBC QN AUTO: 21.5 PG (ref 24–34)
MCHC RBC AUTO-ENTMCNC: 27 G/DL (ref 31–37)
MCHC RBC AUTO-ENTMCNC: 27.8 G/DL (ref 31–37)
MCHC RBC AUTO-ENTMCNC: 27.9 G/DL (ref 31–37)
MCHC RBC AUTO-ENTMCNC: 28.2 G/DL (ref 31–37)
MCHC RBC AUTO-ENTMCNC: 28.4 G/DL (ref 31–37)
MCHC RBC AUTO-ENTMCNC: 28.5 G/DL (ref 31–37)
MCHC RBC AUTO-ENTMCNC: 28.6 G/DL (ref 31–37)
MCHC RBC AUTO-ENTMCNC: 28.7 G/DL (ref 31–37)
MCHC RBC AUTO-ENTMCNC: 28.9 G/DL (ref 31–37)
MCHC RBC AUTO-ENTMCNC: 28.9 G/DL (ref 31–37)
MCHC RBC AUTO-ENTMCNC: 29.2 G/DL (ref 31–37)
MCHC RBC AUTO-ENTMCNC: 29.3 G/DL (ref 31–37)
MCHC RBC AUTO-ENTMCNC: 29.4 G/DL (ref 31–37)
MCHC RBC AUTO-ENTMCNC: 29.5 G/DL (ref 31–37)
MCHC RBC AUTO-ENTMCNC: 29.5 G/DL (ref 31–37)
MCV RBC AUTO: 63 FL (ref 74–97)
MCV RBC AUTO: 68.1 FL (ref 74–97)
MCV RBC AUTO: 69.5 FL (ref 74–97)
MCV RBC AUTO: 70.4 FL (ref 74–97)
MCV RBC AUTO: 70.5 FL (ref 74–97)
MCV RBC AUTO: 71 FL (ref 74–97)
MCV RBC AUTO: 72.3 FL (ref 74–97)
MCV RBC AUTO: 72.6 FL (ref 74–97)
MCV RBC AUTO: 73 FL (ref 74–97)
MCV RBC AUTO: 73.4 FL (ref 74–97)
MCV RBC AUTO: 73.7 FL (ref 74–97)
MCV RBC AUTO: 73.8 FL (ref 74–97)
MCV RBC AUTO: 74.1 FL (ref 74–97)
MCV RBC AUTO: 74.1 FL (ref 74–97)
MCV RBC AUTO: 74.2 FL (ref 74–97)
MCV RBC AUTO: 74.6 FL (ref 74–97)
MCV RBC AUTO: 74.7 FL (ref 74–97)
MONOCYTES # BLD: 0.3 K/UL (ref 0.05–1.2)
MONOCYTES # BLD: 0.3 K/UL (ref 0–1)
MONOCYTES # BLD: 0.4 K/UL (ref 0.05–1.2)
MONOCYTES # BLD: 0.5 K/UL (ref 0.05–1.2)
MONOCYTES # BLD: 0.7 K/UL (ref 0.05–1.2)
MONOCYTES # BLD: 0.8 K/UL (ref 0.05–1.2)
MONOCYTES # BLD: 1.1 K/UL (ref 0.05–1.2)
MONOCYTES NFR BLD: 10 % (ref 3–10)
MONOCYTES NFR BLD: 12 % (ref 3–10)
MONOCYTES NFR BLD: 12 % (ref 3–10)
MONOCYTES NFR BLD: 13 % (ref 3–10)
MONOCYTES NFR BLD: 4 % (ref 3–10)
MONOCYTES NFR BLD: 5 % (ref 2–9)
MONOCYTES NFR BLD: 7 % (ref 3–10)
MONOCYTES NFR BLD: 9 % (ref 3–10)
MONOCYTES NFR BLD: 9 % (ref 3–10)
MUCOUS THREADS URNS QL MICRO: NEGATIVE /LPF
NEUTS BAND NFR BLD MANUAL: 1 % (ref 0–5)
NEUTS SEG # BLD: 3.5 K/UL (ref 1.8–8)
NEUTS SEG # BLD: 3.6 K/UL (ref 1.8–8)
NEUTS SEG # BLD: 3.8 K/UL (ref 1.8–8)
NEUTS SEG # BLD: 4.5 K/UL (ref 1.8–8)
NEUTS SEG # BLD: 4.6 K/UL (ref 1.8–8)
NEUTS SEG # BLD: 4.8 K/UL (ref 1.8–8)
NEUTS SEG # BLD: 6.7 K/UL (ref 1.8–8)
NEUTS SEG # BLD: 7.1 K/UL (ref 1.8–8)
NEUTS SEG # BLD: 7.2 K/UL (ref 1.8–8)
NEUTS SEG NFR BLD: 62 % (ref 40–73)
NEUTS SEG NFR BLD: 63 % (ref 40–73)
NEUTS SEG NFR BLD: 67 % (ref 40–73)
NEUTS SEG NFR BLD: 70 % (ref 40–73)
NEUTS SEG NFR BLD: 73 % (ref 40–73)
NEUTS SEG NFR BLD: 75 % (ref 40–73)
NEUTS SEG NFR BLD: 76 % (ref 42–75)
NEUTS SEG NFR BLD: 77 % (ref 40–73)
NEUTS SEG NFR BLD: 81 % (ref 40–73)
NITRITE UR QL STRIP.AUTO: NEGATIVE
NITRITE UR QL STRIP.AUTO: NEGATIVE
NUC REST EJECTION FRACTION: 79 %
O2/TOTAL GAS SETTING VFR VENT: 0.45 %
O2/TOTAL GAS SETTING VFR VENT: 0.5 %
PCO2 BLD: 40.1 MMHG (ref 35–45)
PCO2 BLD: 40.5 MMHG (ref 35–45)
PEEP RESPIRATORY: 5 CMH2O
PEEP RESPIRATORY: 5 CMH2O
PH BLD: 7.47 [PH] (ref 7.35–7.45)
PH BLD: 7.47 [PH] (ref 7.35–7.45)
PH UR STRIP: 5 [PH] (ref 5–8)
PH UR STRIP: 5 [PH] (ref 5–8)
PIP ISTAT,IPIP: 18
PLATELET # BLD AUTO: 189 K/UL (ref 135–420)
PLATELET # BLD AUTO: 196 K/UL (ref 135–420)
PLATELET # BLD AUTO: 213 K/UL (ref 135–420)
PLATELET # BLD AUTO: 220 K/UL (ref 135–420)
PLATELET # BLD AUTO: 221 K/UL (ref 135–420)
PLATELET # BLD AUTO: 224 K/UL (ref 135–420)
PLATELET # BLD AUTO: 241 K/UL (ref 135–420)
PLATELET # BLD AUTO: 257 K/UL (ref 135–420)
PLATELET # BLD AUTO: 273 K/UL (ref 135–420)
PLATELET # BLD AUTO: 301 K/UL (ref 135–420)
PLATELET # BLD AUTO: 323 K/UL (ref 135–420)
PLATELET # BLD AUTO: 325 K/UL (ref 135–420)
PLATELET # BLD AUTO: 346 K/UL (ref 135–420)
PLATELET # BLD AUTO: 348 K/UL (ref 135–420)
PLATELET # BLD AUTO: 352 K/UL (ref 135–420)
PLATELET # BLD AUTO: 357 K/UL (ref 135–420)
PLATELET # BLD AUTO: 392 K/UL (ref 135–420)
PMV BLD AUTO: 8.3 FL (ref 9.2–11.8)
PMV BLD AUTO: 8.5 FL (ref 9.2–11.8)
PMV BLD AUTO: 8.6 FL (ref 9.2–11.8)
PMV BLD AUTO: 8.6 FL (ref 9.2–11.8)
PMV BLD AUTO: 9 FL (ref 9.2–11.8)
PMV BLD AUTO: 9.1 FL (ref 9.2–11.8)
PMV BLD AUTO: 9.2 FL (ref 9.2–11.8)
PMV BLD AUTO: 9.3 FL (ref 9.2–11.8)
PMV BLD AUTO: 9.4 FL (ref 9.2–11.8)
PO2 BLD: 102 MMHG (ref 80–100)
PO2 BLD: 128 MMHG (ref 80–100)
POTASSIUM SERPL-SCNC: 4.1 MMOL/L (ref 3.5–5.5)
POTASSIUM SERPL-SCNC: 4.2 MMOL/L (ref 3.5–5.5)
POTASSIUM SERPL-SCNC: 4.3 MMOL/L (ref 3.5–5.5)
POTASSIUM SERPL-SCNC: 4.4 MMOL/L (ref 3.5–5.5)
POTASSIUM SERPL-SCNC: 4.5 MMOL/L (ref 3.5–5.5)
POTASSIUM SERPL-SCNC: 4.6 MMOL/L (ref 3.5–5.5)
POTASSIUM SERPL-SCNC: 4.7 MMOL/L (ref 3.5–5.5)
POTASSIUM SERPL-SCNC: 4.7 MMOL/L (ref 3.5–5.5)
POTASSIUM SERPL-SCNC: 4.8 MMOL/L (ref 3.5–5.5)
POTASSIUM SERPL-SCNC: 4.8 MMOL/L (ref 3.5–5.5)
POTASSIUM SERPL-SCNC: 4.9 MMOL/L (ref 3.5–5.5)
PROT SERPL-MCNC: 5.5 G/DL (ref 6.4–8.2)
PROT SERPL-MCNC: 5.7 G/DL (ref 6.4–8.2)
PROT SERPL-MCNC: 5.9 G/DL (ref 6.4–8.2)
PROT SERPL-MCNC: 6.3 G/DL (ref 6.4–8.2)
PROT SERPL-MCNC: 7.6 G/DL (ref 6.4–8.2)
PROT UR STRIP-MCNC: NEGATIVE MG/DL
PROT UR STRIP-MCNC: NEGATIVE MG/DL
PROTHROMBIN TIME: 13 SEC (ref 11.5–15.2)
PROTHROMBIN TIME: 14 SEC (ref 11.5–15.2)
PROTHROMBIN TIME: 14.7 SEC (ref 11.5–15.2)
PROTHROMBIN TIME: 17.6 SEC (ref 11.5–15.2)
RBC # BLD AUTO: 3.27 M/UL (ref 4.2–5.3)
RBC # BLD AUTO: 3.32 M/UL (ref 4.2–5.3)
RBC # BLD AUTO: 3.51 M/UL (ref 4.2–5.3)
RBC # BLD AUTO: 3.51 M/UL (ref 4.2–5.3)
RBC # BLD AUTO: 3.54 M/UL (ref 4.2–5.3)
RBC # BLD AUTO: 3.56 M/UL (ref 4.2–5.3)
RBC # BLD AUTO: 3.59 M/UL (ref 4.2–5.3)
RBC # BLD AUTO: 3.59 M/UL (ref 4.2–5.3)
RBC # BLD AUTO: 3.65 M/UL (ref 4.2–5.3)
RBC # BLD AUTO: 3.67 M/UL (ref 4.2–5.3)
RBC # BLD AUTO: 3.68 M/UL (ref 4.2–5.3)
RBC # BLD AUTO: 3.69 M/UL (ref 4.2–5.3)
RBC # BLD AUTO: 3.83 M/UL (ref 4.2–5.3)
RBC # BLD AUTO: 3.93 M/UL (ref 4.2–5.3)
RBC # BLD AUTO: 3.93 M/UL (ref 4.2–5.3)
RBC # BLD AUTO: 4.08 M/UL (ref 4.2–5.3)
RBC # BLD AUTO: 4.35 M/UL (ref 4.2–5.3)
RBC #/AREA URNS HPF: NEGATIVE /HPF (ref 0–5)
RBC MORPH BLD: ABNORMAL
RETICS/RBC NFR AUTO: 1.9 % (ref 0.5–2.3)
SAO2 % BLD: 98 % (ref 92–97)
SAO2 % BLD: 99 % (ref 92–97)
SERVICE CMNT-IMP: ABNORMAL
SERVICE CMNT-IMP: ABNORMAL
SERVICE CMNT-IMP: NORMAL
SODIUM SERPL-SCNC: 140 MMOL/L (ref 136–145)
SODIUM SERPL-SCNC: 141 MMOL/L (ref 136–145)
SODIUM SERPL-SCNC: 142 MMOL/L (ref 136–145)
SODIUM SERPL-SCNC: 143 MMOL/L (ref 136–145)
SODIUM SERPL-SCNC: 144 MMOL/L (ref 136–145)
SODIUM SERPL-SCNC: 145 MMOL/L (ref 136–145)
SODIUM SERPL-SCNC: 146 MMOL/L (ref 136–145)
SP GR UR REFRACTOMETRY: 1.01 (ref 1–1.03)
SP GR UR REFRACTOMETRY: 1.02 (ref 1–1.03)
SPECIMEN EXP DATE BLD: NORMAL
SPECIMEN EXP DATE BLD: NORMAL
SPECIMEN TYPE: ABNORMAL
SPECIMEN TYPE: ABNORMAL
STATUS OF UNIT,%ST: NORMAL
STRESS BASELINE DIAS BP: 73 MMHG
STRESS BASELINE HR: 88 BPM
STRESS BASELINE SYS BP: 119 MMHG
STRESS ESTIMATED WORKLOAD: 1 METS
STRESS EXERCISE DUR MIN: NORMAL
STRESS PEAK DIAS BP: 79 MMHG
STRESS PEAK SYS BP: 134 MMHG
STRESS PERCENT HR ACHIEVED: 103 %
STRESS POST PEAK HR: 117 BPM
STRESS RATE PRESSURE PRODUCT: NORMAL BPM*MMHG
STRESS ST DEPRESSION: 0 MM
STRESS ST ELEVATION: 0 MM
STRESS STAGE 1 BP: NORMAL MMHG
STRESS STAGE 1 DURATION: NORMAL MIN:SEC
STRESS STAGE 1 HR: 111 BPM
STRESS STAGE RECOVERY 1 BP: NORMAL MMHG
STRESS STAGE RECOVERY 1 DURATION: NORMAL MIN:SEC
STRESS STAGE RECOVERY 1 HR: 105 BPM
STRESS TARGET HR: 114 BPM
TIBC SERPL-MCNC: 400 UG/DL (ref 250–450)
TIBC SERPL-MCNC: 419 UG/DL (ref 250–450)
TOTAL RESP. RATE, ITRR: 14
TOTAL RESP. RATE, ITRR: 14
TRIGL SERPL-MCNC: 117 MG/DL (ref ?–150)
TRIGL SERPL-MCNC: 120 MG/DL (ref ?–150)
TROPONIN I SERPL-MCNC: 0.03 NG/ML (ref 0–0.06)
TROPONIN I SERPL-MCNC: <0.02 NG/ML (ref 0–0.06)
UNIT DIVISION, %UDIV: 0
UROBILINOGEN UR QL STRIP.AUTO: 0.2 EU/DL (ref 0.2–1)
UROBILINOGEN UR QL STRIP.AUTO: 0.2 EU/DL (ref 0.2–1)
VENTILATION MODE VENT: ABNORMAL
VENTILATION MODE VENT: ABNORMAL
VIT B12 SERPL-MCNC: 270 PG/ML (ref 211–911)
VLDLC SERPL CALC-MCNC: 23.4 MG/DL
VLDLC SERPL CALC-MCNC: 24 MG/DL
VOLUME CONTROL PLUS IVLCP: YES
VOLUME CONTROL PLUS IVLCP: YES
VT SETTING VENT: 400 ML
VT SETTING VENT: 400 ML
WBC # BLD AUTO: 5.6 K/UL (ref 4.6–13.2)
WBC # BLD AUTO: 5.6 K/UL (ref 4.6–13.2)
WBC # BLD AUTO: 5.8 K/UL (ref 4.6–13.2)
WBC # BLD AUTO: 6 K/UL (ref 4.6–13.2)
WBC # BLD AUTO: 6.1 K/UL (ref 4.6–13.2)
WBC # BLD AUTO: 6.2 K/UL (ref 4.6–13.2)
WBC # BLD AUTO: 6.2 K/UL (ref 4.6–13.2)
WBC # BLD AUTO: 6.3 K/UL (ref 4.6–13.2)
WBC # BLD AUTO: 6.5 K/UL (ref 4.6–13.2)
WBC # BLD AUTO: 6.7 K/UL (ref 4.6–13.2)
WBC # BLD AUTO: 7.1 K/UL (ref 4.6–13.2)
WBC # BLD AUTO: 8.7 K/UL (ref 4.6–13.2)
WBC # BLD AUTO: 8.9 K/UL (ref 4.6–13.2)
WBC # BLD AUTO: 9 K/UL (ref 4.6–13.2)
WBC # BLD AUTO: 9.1 K/UL (ref 4.6–13.2)
WBC # BLD AUTO: 9.2 K/UL (ref 4.6–13.2)
WBC # BLD AUTO: 9.3 K/UL (ref 4.6–13.2)
WBC URNS QL MICRO: ABNORMAL /HPF (ref 0–5)

## 2018-01-01 PROCEDURE — 74011636637 HC RX REV CODE- 636/637: Performed by: INTERNAL MEDICINE

## 2018-01-01 PROCEDURE — 85027 COMPLETE CBC AUTOMATED: CPT | Performed by: HOSPITALIST

## 2018-01-01 PROCEDURE — 74011000250 HC RX REV CODE- 250: Performed by: FAMILY MEDICINE

## 2018-01-01 PROCEDURE — 36415 COLL VENOUS BLD VENIPUNCTURE: CPT | Performed by: HOSPITALIST

## 2018-01-01 PROCEDURE — 97116 GAIT TRAINING THERAPY: CPT

## 2018-01-01 PROCEDURE — 92610 EVALUATE SWALLOWING FUNCTION: CPT

## 2018-01-01 PROCEDURE — 74011250636 HC RX REV CODE- 250/636: Performed by: INTERNAL MEDICINE

## 2018-01-01 PROCEDURE — G0299 HHS/HOSPICE OF RN EA 15 MIN: HCPCS

## 2018-01-01 PROCEDURE — 74011250637 HC RX REV CODE- 250/637: Performed by: INTERNAL MEDICINE

## 2018-01-01 PROCEDURE — 74011250637 HC RX REV CODE- 250/637: Performed by: FAMILY MEDICINE

## 2018-01-01 PROCEDURE — 65660000000 HC RM CCU STEPDOWN

## 2018-01-01 PROCEDURE — 77030002966 HC SUT PDS J&J -A: Performed by: SURGERY

## 2018-01-01 PROCEDURE — 74011000250 HC RX REV CODE- 250: Performed by: INTERNAL MEDICINE

## 2018-01-01 PROCEDURE — 82378 CARCINOEMBRYONIC ANTIGEN: CPT | Performed by: INTERNAL MEDICINE

## 2018-01-01 PROCEDURE — 74011250637 HC RX REV CODE- 250/637: Performed by: SURGERY

## 2018-01-01 PROCEDURE — 88309 TISSUE EXAM BY PATHOLOGIST: CPT

## 2018-01-01 PROCEDURE — 82962 GLUCOSE BLOOD TEST: CPT

## 2018-01-01 PROCEDURE — 77010033678 HC OXYGEN DAILY

## 2018-01-01 PROCEDURE — 74011636637 HC RX REV CODE- 636/637: Performed by: FAMILY MEDICINE

## 2018-01-01 PROCEDURE — 74011250637 HC RX REV CODE- 250/637: Performed by: HOSPITALIST

## 2018-01-01 PROCEDURE — 85018 HEMOGLOBIN: CPT | Performed by: FAMILY MEDICINE

## 2018-01-01 PROCEDURE — G0156 HHCP-SVS OF AIDE,EA 15 MIN: HCPCS

## 2018-01-01 PROCEDURE — 76010000134 HC OR TIME 3.5 TO 4 HR: Performed by: SURGERY

## 2018-01-01 PROCEDURE — 71045 X-RAY EXAM CHEST 1 VIEW: CPT

## 2018-01-01 PROCEDURE — 74011250636 HC RX REV CODE- 250/636: Performed by: FAMILY MEDICINE

## 2018-01-01 PROCEDURE — 87040 BLOOD CULTURE FOR BACTERIA: CPT | Performed by: EMERGENCY MEDICINE

## 2018-01-01 PROCEDURE — 80048 BASIC METABOLIC PNL TOTAL CA: CPT | Performed by: HOSPITALIST

## 2018-01-01 PROCEDURE — 94760 N-INVAS EAR/PLS OXIMETRY 1: CPT

## 2018-01-01 PROCEDURE — 74011000250 HC RX REV CODE- 250: Performed by: HOSPITALIST

## 2018-01-01 PROCEDURE — 74011000250 HC RX REV CODE- 250

## 2018-01-01 PROCEDURE — 85025 COMPLETE CBC W/AUTO DIFF WBC: CPT | Performed by: INTERNAL MEDICINE

## 2018-01-01 PROCEDURE — 74011250636 HC RX REV CODE- 250/636: Performed by: HOSPITALIST

## 2018-01-01 PROCEDURE — 70496 CT ANGIOGRAPHY HEAD: CPT

## 2018-01-01 PROCEDURE — 0651 HSPC ROUTINE HOME CARE

## 2018-01-01 PROCEDURE — 97164 PT RE-EVAL EST PLAN CARE: CPT

## 2018-01-01 PROCEDURE — 36430 TRANSFUSION BLD/BLD COMPNT: CPT

## 2018-01-01 PROCEDURE — 85025 COMPLETE CBC W/AUTO DIFF WBC: CPT | Performed by: EMERGENCY MEDICINE

## 2018-01-01 PROCEDURE — 74011250637 HC RX REV CODE- 250/637: Performed by: PSYCHIATRY & NEUROLOGY

## 2018-01-01 PROCEDURE — 77030014008 HC SPNG HEMSTAT J&J -C: Performed by: SURGERY

## 2018-01-01 PROCEDURE — 77030034850

## 2018-01-01 PROCEDURE — 71250 CT THORAX DX C-: CPT

## 2018-01-01 PROCEDURE — 74011250636 HC RX REV CODE- 250/636

## 2018-01-01 PROCEDURE — 77030027138 HC INCENT SPIROMETER -A

## 2018-01-01 PROCEDURE — 74011250637 HC RX REV CODE- 250/637: Performed by: PHYSICIAN ASSISTANT

## 2018-01-01 PROCEDURE — 99285 EMERGENCY DEPT VISIT HI MDM: CPT

## 2018-01-01 PROCEDURE — 93005 ELECTROCARDIOGRAM TRACING: CPT

## 2018-01-01 PROCEDURE — 97162 PT EVAL MOD COMPLEX 30 MIN: CPT

## 2018-01-01 PROCEDURE — 77030008477 HC STYL SATN SLP COVD -A: Performed by: ANESTHESIOLOGY

## 2018-01-01 PROCEDURE — 76060000038 HC ANESTHESIA 3.5 TO 4 HR: Performed by: SURGERY

## 2018-01-01 PROCEDURE — 70450 CT HEAD/BRAIN W/O DYE: CPT

## 2018-01-01 PROCEDURE — 0DBK8ZX EXCISION OF ASCENDING COLON, VIA NATURAL OR ARTIFICIAL OPENING ENDOSCOPIC, DIAGNOSTIC: ICD-10-PCS | Performed by: INTERNAL MEDICINE

## 2018-01-01 PROCEDURE — 92507 TX SP LANG VOICE COMM INDIV: CPT

## 2018-01-01 PROCEDURE — 0DBV0ZZ EXCISION OF MESENTERY, OPEN APPROACH: ICD-10-PCS | Performed by: SURGERY

## 2018-01-01 PROCEDURE — 36415 COLL VENOUS BLD VENIPUNCTURE: CPT | Performed by: INTERNAL MEDICINE

## 2018-01-01 PROCEDURE — 74011250636 HC RX REV CODE- 250/636: Performed by: SURGERY

## 2018-01-01 PROCEDURE — 0DBM8ZZ EXCISION OF DESCENDING COLON, VIA NATURAL OR ARTIFICIAL OPENING ENDOSCOPIC: ICD-10-PCS | Performed by: INTERNAL MEDICINE

## 2018-01-01 PROCEDURE — 65610000006 HC RM INTENSIVE CARE

## 2018-01-01 PROCEDURE — 86900 BLOOD TYPING SEROLOGIC ABO: CPT | Performed by: FAMILY MEDICINE

## 2018-01-01 PROCEDURE — G0500 MOD SEDAT ENDO SERVICE >5YRS: HCPCS | Performed by: INTERNAL MEDICINE

## 2018-01-01 PROCEDURE — 80053 COMPREHEN METABOLIC PANEL: CPT | Performed by: INTERNAL MEDICINE

## 2018-01-01 PROCEDURE — 86920 COMPATIBILITY TEST SPIN: CPT | Performed by: FAMILY MEDICINE

## 2018-01-01 PROCEDURE — 77030013991 HC SNR POLYP ENDOSC BSC -A: Performed by: INTERNAL MEDICINE

## 2018-01-01 PROCEDURE — 88341 IMHCHEM/IMCYTCHM EA ADD ANTB: CPT | Performed by: SURGERY

## 2018-01-01 PROCEDURE — 77030025242 HC RETRCTR ALEXIS 1 AMR -B: Performed by: SURGERY

## 2018-01-01 PROCEDURE — C9113 INJ PANTOPRAZOLE SODIUM, VIA: HCPCS | Performed by: FAMILY MEDICINE

## 2018-01-01 PROCEDURE — 82728 ASSAY OF FERRITIN: CPT | Performed by: FAMILY MEDICINE

## 2018-01-01 PROCEDURE — 74011000250 HC RX REV CODE- 250: Performed by: EMERGENCY MEDICINE

## 2018-01-01 PROCEDURE — 51701 INSERT BLADDER CATHETER: CPT

## 2018-01-01 PROCEDURE — 77030011264 HC ELECTRD BLD EXT COVD -A: Performed by: SURGERY

## 2018-01-01 PROCEDURE — 78452 HT MUSCLE IMAGE SPECT MULT: CPT

## 2018-01-01 PROCEDURE — 97167 OT EVAL HIGH COMPLEX 60 MIN: CPT

## 2018-01-01 PROCEDURE — 77030008462 HC STPLR SKN PROX J&J -A: Performed by: SURGERY

## 2018-01-01 PROCEDURE — 80048 BASIC METABOLIC PNL TOTAL CA: CPT | Performed by: INTERNAL MEDICINE

## 2018-01-01 PROCEDURE — 74011636320 HC RX REV CODE- 636/320: Performed by: HOSPITALIST

## 2018-01-01 PROCEDURE — 85610 PROTHROMBIN TIME: CPT | Performed by: HOSPITALIST

## 2018-01-01 PROCEDURE — 74011636320 HC RX REV CODE- 636/320: Performed by: FAMILY MEDICINE

## 2018-01-01 PROCEDURE — 85025 COMPLETE CBC W/AUTO DIFF WBC: CPT | Performed by: HOSPITALIST

## 2018-01-01 PROCEDURE — 77030018836 HC SOL IRR NACL ICUM -A: Performed by: SURGERY

## 2018-01-01 PROCEDURE — C9290 INJ, BUPIVACAINE LIPOSOME: HCPCS | Performed by: SURGERY

## 2018-01-01 PROCEDURE — 83540 ASSAY OF IRON: CPT | Performed by: FAMILY MEDICINE

## 2018-01-01 PROCEDURE — 70551 MRI BRAIN STEM W/O DYE: CPT

## 2018-01-01 PROCEDURE — 77030009426 HC FCPS BIOP ENDOSC BSC -B: Performed by: INTERNAL MEDICINE

## 2018-01-01 PROCEDURE — HOSPICE MEDICATION HC HH HOSPICE MEDICATION

## 2018-01-01 PROCEDURE — 88305 TISSUE EXAM BY PATHOLOGIST: CPT | Performed by: INTERNAL MEDICINE

## 2018-01-01 PROCEDURE — 83605 ASSAY OF LACTIC ACID: CPT | Performed by: EMERGENCY MEDICINE

## 2018-01-01 PROCEDURE — 93306 TTE W/DOPPLER COMPLETE: CPT

## 2018-01-01 PROCEDURE — 74011000258 HC RX REV CODE- 258: Performed by: SURGERY

## 2018-01-01 PROCEDURE — 97168 OT RE-EVAL EST PLAN CARE: CPT

## 2018-01-01 PROCEDURE — 74011000250 HC RX REV CODE- 250: Performed by: SURGERY

## 2018-01-01 PROCEDURE — 85730 THROMBOPLASTIN TIME PARTIAL: CPT | Performed by: HOSPITALIST

## 2018-01-01 PROCEDURE — 74011000258 HC RX REV CODE- 258: Performed by: HOSPITALIST

## 2018-01-01 PROCEDURE — 88312 SPECIAL STAINS GROUP 1: CPT | Performed by: SURGERY

## 2018-01-01 PROCEDURE — 87086 URINE CULTURE/COLONY COUNT: CPT | Performed by: FAMILY MEDICINE

## 2018-01-01 PROCEDURE — 74011000250 HC RX REV CODE- 250: Performed by: ANESTHESIOLOGY

## 2018-01-01 PROCEDURE — 96125 COGNITIVE TEST BY HC PRO: CPT

## 2018-01-01 PROCEDURE — 0DBA0ZZ EXCISION OF JEJUNUM, OPEN APPROACH: ICD-10-PCS | Performed by: SURGERY

## 2018-01-01 PROCEDURE — 0DBL8ZZ EXCISION OF TRANSVERSE COLON, VIA NATURAL OR ARTIFICIAL OPENING ENDOSCOPIC: ICD-10-PCS | Performed by: INTERNAL MEDICINE

## 2018-01-01 PROCEDURE — 31500 INSERT EMERGENCY AIRWAY: CPT

## 2018-01-01 PROCEDURE — 83540 ASSAY OF IRON: CPT | Performed by: HOSPITALIST

## 2018-01-01 PROCEDURE — 97530 THERAPEUTIC ACTIVITIES: CPT

## 2018-01-01 PROCEDURE — 30233N1 TRANSFUSION OF NONAUTOLOGOUS RED BLOOD CELLS INTO PERIPHERAL VEIN, PERCUTANEOUS APPROACH: ICD-10-PCS | Performed by: FAMILY MEDICINE

## 2018-01-01 PROCEDURE — 74011250636 HC RX REV CODE- 250/636: Performed by: ANESTHESIOLOGY

## 2018-01-01 PROCEDURE — 77010033711 HC HIGH FLOW OXYGEN

## 2018-01-01 PROCEDURE — 74011250637 HC RX REV CODE- 250/637: Performed by: EMERGENCY MEDICINE

## 2018-01-01 PROCEDURE — 83036 HEMOGLOBIN GLYCOSYLATED A1C: CPT | Performed by: HOSPITALIST

## 2018-01-01 PROCEDURE — 93970 EXTREMITY STUDY: CPT

## 2018-01-01 PROCEDURE — 82947 ASSAY GLUCOSE BLOOD QUANT: CPT

## 2018-01-01 PROCEDURE — G0155 HHCP-SVS OF CSW,EA 15 MIN: HCPCS

## 2018-01-01 PROCEDURE — P9016 RBC LEUKOCYTES REDUCED: HCPCS | Performed by: HOSPITALIST

## 2018-01-01 PROCEDURE — 77030034850: Performed by: SURGERY

## 2018-01-01 PROCEDURE — 80053 COMPREHEN METABOLIC PANEL: CPT | Performed by: HOSPITALIST

## 2018-01-01 PROCEDURE — 94002 VENT MGMT INPAT INIT DAY: CPT

## 2018-01-01 PROCEDURE — 99153 MOD SED SAME PHYS/QHP EA: CPT | Performed by: INTERNAL MEDICINE

## 2018-01-01 PROCEDURE — 77030018521 HC STPLR ENDOSCOPIC J&J -C: Performed by: SURGERY

## 2018-01-01 PROCEDURE — 36415 COLL VENOUS BLD VENIPUNCTURE: CPT | Performed by: FAMILY MEDICINE

## 2018-01-01 PROCEDURE — 86900 BLOOD TYPING SEROLOGIC ABO: CPT | Performed by: HOSPITALIST

## 2018-01-01 PROCEDURE — 77030032490 HC SLV COMPR SCD KNE COVD -B: Performed by: SURGERY

## 2018-01-01 PROCEDURE — 77030011267 HC ELECTRD BLD COVD -A: Performed by: SURGERY

## 2018-01-01 PROCEDURE — 97110 THERAPEUTIC EXERCISES: CPT

## 2018-01-01 PROCEDURE — 97535 SELF CARE MNGMENT TRAINING: CPT

## 2018-01-01 PROCEDURE — 85045 AUTOMATED RETICULOCYTE COUNT: CPT | Performed by: FAMILY MEDICINE

## 2018-01-01 PROCEDURE — 74011636637 HC RX REV CODE- 636/637: Performed by: HOSPITALIST

## 2018-01-01 PROCEDURE — 94640 AIRWAY INHALATION TREATMENT: CPT

## 2018-01-01 PROCEDURE — 83036 HEMOGLOBIN GLYCOSYLATED A1C: CPT | Performed by: FAMILY MEDICINE

## 2018-01-01 PROCEDURE — 92526 ORAL FUNCTION THERAPY: CPT

## 2018-01-01 PROCEDURE — 84520 ASSAY OF UREA NITROGEN: CPT | Performed by: PSYCHIATRY & NEUROLOGY

## 2018-01-01 PROCEDURE — 82803 BLOOD GASES ANY COMBINATION: CPT

## 2018-01-01 PROCEDURE — 88307 TISSUE EXAM BY PATHOLOGIST: CPT | Performed by: SURGERY

## 2018-01-01 PROCEDURE — 77030006643: Performed by: ANESTHESIOLOGY

## 2018-01-01 PROCEDURE — 82607 VITAMIN B-12: CPT | Performed by: FAMILY MEDICINE

## 2018-01-01 PROCEDURE — 36600 WITHDRAWAL OF ARTERIAL BLOOD: CPT

## 2018-01-01 PROCEDURE — 96360 HYDRATION IV INFUSION INIT: CPT

## 2018-01-01 PROCEDURE — 85018 HEMOGLOBIN: CPT | Performed by: INTERNAL MEDICINE

## 2018-01-01 PROCEDURE — 77030032490 HC SLV COMPR SCD KNE COVD -B: Performed by: INTERNAL MEDICINE

## 2018-01-01 PROCEDURE — 74011250636 HC RX REV CODE- 250/636: Performed by: EMERGENCY MEDICINE

## 2018-01-01 PROCEDURE — 77030033138 HC SUT PGA STRATFX J&J -B: Performed by: SURGERY

## 2018-01-01 PROCEDURE — 77030008683 HC TU ET CUF COVD -A: Performed by: ANESTHESIOLOGY

## 2018-01-01 PROCEDURE — 77030013079 HC BLNKT BAIR HGGR 3M -A: Performed by: NURSE ANESTHETIST, CERTIFIED REGISTERED

## 2018-01-01 PROCEDURE — 77030009978 HC RELD STPLR TCR J&J -B: Performed by: SURGERY

## 2018-01-01 PROCEDURE — 77030003657 HC NDL SCLER BSC -B: Performed by: INTERNAL MEDICINE

## 2018-01-01 PROCEDURE — 94003 VENT MGMT INPAT SUBQ DAY: CPT

## 2018-01-01 PROCEDURE — 77030038020 HC MANFLD NEPTUNE STRY -B: Performed by: SURGERY

## 2018-01-01 PROCEDURE — 76040000008: Performed by: INTERNAL MEDICINE

## 2018-01-01 PROCEDURE — 88342 IMHCHEM/IMCYTCHM 1ST ANTB: CPT | Performed by: SURGERY

## 2018-01-01 PROCEDURE — 97161 PT EVAL LOW COMPLEX 20 MIN: CPT

## 2018-01-01 PROCEDURE — 77030032490 HC SLV COMPR SCD KNE COVD -B

## 2018-01-01 PROCEDURE — 76450000000

## 2018-01-01 PROCEDURE — 85027 COMPLETE CBC AUTOMATED: CPT | Performed by: INTERNAL MEDICINE

## 2018-01-01 PROCEDURE — 80053 COMPREHEN METABOLIC PANEL: CPT | Performed by: EMERGENCY MEDICINE

## 2018-01-01 PROCEDURE — 74011636320 HC RX REV CODE- 636/320: Performed by: INTERNAL MEDICINE

## 2018-01-01 PROCEDURE — 81001 URINALYSIS AUTO W/SCOPE: CPT | Performed by: FAMILY MEDICINE

## 2018-01-01 PROCEDURE — 74177 CT ABD & PELVIS W/CONTRAST: CPT

## 2018-01-01 PROCEDURE — 77030037875 HC DRSG MEPILEX <16IN BORD MOLN -A

## 2018-01-01 PROCEDURE — 85610 PROTHROMBIN TIME: CPT | Performed by: INTERNAL MEDICINE

## 2018-01-01 PROCEDURE — 3336500001 HSPC ELECTION

## 2018-01-01 PROCEDURE — 82550 ASSAY OF CK (CPK): CPT | Performed by: INTERNAL MEDICINE

## 2018-01-01 PROCEDURE — 77030038020 HC MANFLD NEPTUNE STRY -B: Performed by: INTERNAL MEDICINE

## 2018-01-01 PROCEDURE — 0DTF0ZZ RESECTION OF RIGHT LARGE INTESTINE, OPEN APPROACH: ICD-10-PCS | Performed by: SURGERY

## 2018-01-01 PROCEDURE — 77030020407 HC IV BLD WRMR ST 3M -A: Performed by: ANESTHESIOLOGY

## 2018-01-01 PROCEDURE — 77030003029 HC SUT VCRL J&J -B: Performed by: SURGERY

## 2018-01-01 PROCEDURE — 82550 ASSAY OF CK (CPK): CPT | Performed by: EMERGENCY MEDICINE

## 2018-01-01 PROCEDURE — 86920 COMPATIBILITY TEST SPIN: CPT | Performed by: HOSPITALIST

## 2018-01-01 PROCEDURE — 81003 URINALYSIS AUTO W/O SCOPE: CPT | Performed by: EMERGENCY MEDICINE

## 2018-01-01 PROCEDURE — P9016 RBC LEUKOCYTES REDUCED: HCPCS | Performed by: FAMILY MEDICINE

## 2018-01-01 PROCEDURE — 80061 LIPID PANEL: CPT | Performed by: HOSPITALIST

## 2018-01-01 PROCEDURE — 80061 LIPID PANEL: CPT | Performed by: FAMILY MEDICINE

## 2018-01-01 PROCEDURE — 77030026102 HC DEV TISS ENSEAL G2 J&J -F: Performed by: SURGERY

## 2018-01-01 PROCEDURE — 77030012422 HC DRN WND COVD -A: Performed by: SURGERY

## 2018-01-01 PROCEDURE — 93017 CV STRESS TEST TRACING ONLY: CPT

## 2018-01-01 PROCEDURE — 36415 COLL VENOUS BLD VENIPUNCTURE: CPT | Performed by: PSYCHIATRY & NEUROLOGY

## 2018-01-01 PROCEDURE — 88360 TUMOR IMMUNOHISTOCHEM/MANUAL: CPT

## 2018-01-01 PROCEDURE — 95819 EEG AWAKE AND ASLEEP: CPT | Performed by: PSYCHIATRY & NEUROLOGY

## 2018-01-01 PROCEDURE — 77030020256 HC SOL INJ NACL 0.9%  500ML: Performed by: INTERNAL MEDICINE

## 2018-01-01 PROCEDURE — 85018 HEMOGLOBIN: CPT | Performed by: HOSPITALIST

## 2018-01-01 RX ORDER — METRONIDAZOLE 250 MG/1
1000 TABLET ORAL
Status: COMPLETED | OUTPATIENT
Start: 2018-01-01 | End: 2018-01-01

## 2018-01-01 RX ORDER — HEPARIN SODIUM 5000 [USP'U]/ML
5000 INJECTION, SOLUTION INTRAVENOUS; SUBCUTANEOUS EVERY 8 HOURS
Status: DISCONTINUED | OUTPATIENT
Start: 2018-01-01 | End: 2018-01-01

## 2018-01-01 RX ORDER — ONDANSETRON 2 MG/ML
INJECTION INTRAMUSCULAR; INTRAVENOUS AS NEEDED
Status: DISCONTINUED | OUTPATIENT
Start: 2018-01-01 | End: 2018-01-01 | Stop reason: HOSPADM

## 2018-01-01 RX ORDER — MAGNESIUM SULFATE 100 %
4 CRYSTALS MISCELLANEOUS AS NEEDED
Status: DISCONTINUED | OUTPATIENT
Start: 2018-01-01 | End: 2018-01-01 | Stop reason: HOSPADM

## 2018-01-01 RX ORDER — RANITIDINE 150 MG/1
150 TABLET, FILM COATED ORAL 2 TIMES DAILY
Status: DISCONTINUED | OUTPATIENT
Start: 2018-01-01 | End: 2018-01-01

## 2018-01-01 RX ORDER — POLYETHYLENE GLYCOL 3350 17 G/17G
17 POWDER, FOR SOLUTION ORAL 2 TIMES DAILY
Status: DISCONTINUED | OUTPATIENT
Start: 2018-01-01 | End: 2018-01-01 | Stop reason: HOSPADM

## 2018-01-01 RX ORDER — FLUMAZENIL 0.1 MG/ML
0.2 INJECTION INTRAVENOUS
Status: DISCONTINUED | OUTPATIENT
Start: 2018-01-01 | End: 2018-01-01 | Stop reason: HOSPADM

## 2018-01-01 RX ORDER — ATORVASTATIN CALCIUM 20 MG/1
80 TABLET, FILM COATED ORAL DAILY
Status: DISCONTINUED | OUTPATIENT
Start: 2018-01-01 | End: 2018-01-01 | Stop reason: HOSPADM

## 2018-01-01 RX ORDER — HYDROCODONE BITARTRATE AND ACETAMINOPHEN 5; 325 MG/1; MG/1
1 TABLET ORAL
Status: DISCONTINUED | OUTPATIENT
Start: 2018-01-01 | End: 2018-01-01

## 2018-01-01 RX ORDER — FENTANYL CITRATE 50 UG/ML
INJECTION, SOLUTION INTRAMUSCULAR; INTRAVENOUS AS NEEDED
Status: DISCONTINUED | OUTPATIENT
Start: 2018-01-01 | End: 2018-01-01 | Stop reason: HOSPADM

## 2018-01-01 RX ORDER — ATORVASTATIN CALCIUM 80 MG/1
80 TABLET, FILM COATED ORAL DAILY
Qty: 2 TAB | Refills: 0 | Status: SHIPPED
Start: 2018-01-01 | End: 2018-01-01

## 2018-01-01 RX ORDER — DEXTROSE MONOHYDRATE AND SODIUM CHLORIDE 5; .45 G/100ML; G/100ML
125 INJECTION, SOLUTION INTRAVENOUS CONTINUOUS
Status: DISCONTINUED | OUTPATIENT
Start: 2018-01-01 | End: 2018-01-01

## 2018-01-01 RX ORDER — FLUTICASONE FUROATE AND VILANTEROL 100; 25 UG/1; UG/1
1 POWDER RESPIRATORY (INHALATION) DAILY
Status: DISCONTINUED | OUTPATIENT
Start: 2018-01-01 | End: 2018-01-01 | Stop reason: HOSPADM

## 2018-01-01 RX ORDER — NALOXONE HYDROCHLORIDE 0.4 MG/ML
0.2 INJECTION, SOLUTION INTRAMUSCULAR; INTRAVENOUS; SUBCUTANEOUS AS NEEDED
Status: DISCONTINUED | OUTPATIENT
Start: 2018-01-01 | End: 2018-01-01 | Stop reason: HOSPADM

## 2018-01-01 RX ORDER — SODIUM CHLORIDE 0.9 % (FLUSH) 0.9 %
5-10 SYRINGE (ML) INJECTION AS NEEDED
Status: DISCONTINUED | OUTPATIENT
Start: 2018-01-01 | End: 2018-01-01 | Stop reason: HOSPADM

## 2018-01-01 RX ORDER — METOPROLOL TARTRATE 25 MG/1
12.5 TABLET, FILM COATED ORAL EVERY 12 HOURS
Status: DISCONTINUED | OUTPATIENT
Start: 2018-01-01 | End: 2018-01-01 | Stop reason: HOSPADM

## 2018-01-01 RX ORDER — IPRATROPIUM BROMIDE AND ALBUTEROL SULFATE 2.5; .5 MG/3ML; MG/3ML
3 SOLUTION RESPIRATORY (INHALATION)
Qty: 30 NEBULE | Refills: 1 | Status: SHIPPED
Start: 2018-01-01 | End: 2018-01-01

## 2018-01-01 RX ORDER — DEXTROSE 50 % IN WATER (D50W) INTRAVENOUS SYRINGE
25
Status: COMPLETED | OUTPATIENT
Start: 2018-01-01 | End: 2018-01-01

## 2018-01-01 RX ORDER — SODIUM CHLORIDE 9 MG/ML
100 INJECTION, SOLUTION INTRAVENOUS CONTINUOUS
Status: DISCONTINUED | OUTPATIENT
Start: 2018-01-01 | End: 2018-01-01

## 2018-01-01 RX ORDER — NEOMYCIN SULFATE 500 MG/1
1000 TABLET ORAL
Status: COMPLETED | OUTPATIENT
Start: 2018-01-01 | End: 2018-01-01

## 2018-01-01 RX ORDER — FLUTICASONE PROPIONATE 50 MCG
2 SPRAY, SUSPENSION (ML) NASAL DAILY
Status: DISCONTINUED | OUTPATIENT
Start: 2018-01-01 | End: 2018-01-01 | Stop reason: HOSPADM

## 2018-01-01 RX ORDER — MOMETASONE FUROATE 50 UG/1
2 SPRAY, METERED NASAL DAILY
Status: DISCONTINUED | OUTPATIENT
Start: 2018-01-01 | End: 2018-01-01 | Stop reason: CLARIF

## 2018-01-01 RX ORDER — NALOXONE HYDROCHLORIDE 0.4 MG/ML
0.4 INJECTION, SOLUTION INTRAMUSCULAR; INTRAVENOUS; SUBCUTANEOUS
Status: DISCONTINUED | OUTPATIENT
Start: 2018-01-01 | End: 2018-01-01 | Stop reason: HOSPADM

## 2018-01-01 RX ORDER — IPRATROPIUM BROMIDE 0.5 MG/2.5ML
0.5 SOLUTION RESPIRATORY (INHALATION) 4 TIMES DAILY
Status: DISCONTINUED | OUTPATIENT
Start: 2018-01-01 | End: 2018-01-01

## 2018-01-01 RX ORDER — FENTANYL CITRATE 50 UG/ML
25 INJECTION, SOLUTION INTRAMUSCULAR; INTRAVENOUS AS NEEDED
Status: DISCONTINUED | OUTPATIENT
Start: 2018-01-01 | End: 2018-01-01

## 2018-01-01 RX ORDER — MAGNESIUM CITRATE
296 SOLUTION, ORAL ORAL ONCE
Status: COMPLETED | OUTPATIENT
Start: 2018-01-01 | End: 2018-01-01

## 2018-01-01 RX ORDER — FAMOTIDINE 20 MG/1
20 TABLET, FILM COATED ORAL DAILY
Status: DISCONTINUED | OUTPATIENT
Start: 2018-01-01 | End: 2018-01-01 | Stop reason: HOSPADM

## 2018-01-01 RX ORDER — MORPHINE SULFATE 2 MG/ML
4 INJECTION, SOLUTION INTRAMUSCULAR; INTRAVENOUS ONCE
Status: COMPLETED | OUTPATIENT
Start: 2018-01-01 | End: 2018-01-01

## 2018-01-01 RX ORDER — INSULIN LISPRO 100 [IU]/ML
INJECTION, SOLUTION INTRAVENOUS; SUBCUTANEOUS
Status: DISCONTINUED | OUTPATIENT
Start: 2018-01-01 | End: 2018-01-01 | Stop reason: HOSPADM

## 2018-01-01 RX ORDER — PROPOFOL 10 MG/ML
INJECTION, EMULSION INTRAVENOUS AS NEEDED
Status: DISCONTINUED | OUTPATIENT
Start: 2018-01-01 | End: 2018-01-01 | Stop reason: HOSPADM

## 2018-01-01 RX ORDER — HEPARIN SODIUM 1000 [USP'U]/ML
80 INJECTION, SOLUTION INTRAVENOUS; SUBCUTANEOUS ONCE
Status: COMPLETED | OUTPATIENT
Start: 2018-01-01 | End: 2018-01-01

## 2018-01-01 RX ORDER — SODIUM CHLORIDE 9 MG/ML
250 INJECTION, SOLUTION INTRAVENOUS AS NEEDED
Status: DISCONTINUED | OUTPATIENT
Start: 2018-01-01 | End: 2018-01-01 | Stop reason: HOSPADM

## 2018-01-01 RX ORDER — SODIUM CHLORIDE 9 MG/ML
75 INJECTION, SOLUTION INTRAVENOUS CONTINUOUS
Status: DISCONTINUED | OUTPATIENT
Start: 2018-01-01 | End: 2018-01-01

## 2018-01-01 RX ORDER — IPRATROPIUM BROMIDE 0.5 MG/2.5ML
0.5 SOLUTION RESPIRATORY (INHALATION)
Status: DISCONTINUED | OUTPATIENT
Start: 2018-01-01 | End: 2018-01-01

## 2018-01-01 RX ORDER — MONTELUKAST SODIUM 10 MG/1
10 TABLET ORAL
COMMUNITY

## 2018-01-01 RX ORDER — MIDAZOLAM HYDROCHLORIDE 1 MG/ML
INJECTION, SOLUTION INTRAMUSCULAR; INTRAVENOUS AS NEEDED
Status: DISCONTINUED | OUTPATIENT
Start: 2018-01-01 | End: 2018-01-01 | Stop reason: HOSPADM

## 2018-01-01 RX ORDER — ASPIRIN 325 MG
325 TABLET ORAL
Status: COMPLETED | OUTPATIENT
Start: 2018-01-01 | End: 2018-01-01

## 2018-01-01 RX ORDER — NEOMYCIN SULFATE 500 MG/1
1000 TABLET ORAL AS NEEDED
Status: DISCONTINUED | OUTPATIENT
Start: 2018-01-01 | End: 2018-01-01 | Stop reason: CLARIF

## 2018-01-01 RX ORDER — LIDOCAINE HYDROCHLORIDE 20 MG/ML
INJECTION, SOLUTION EPIDURAL; INFILTRATION; INTRACAUDAL; PERINEURAL AS NEEDED
Status: DISCONTINUED | OUTPATIENT
Start: 2018-01-01 | End: 2018-01-01 | Stop reason: HOSPADM

## 2018-01-01 RX ORDER — HEPARIN SODIUM 10000 [USP'U]/100ML
18-36 INJECTION, SOLUTION INTRAVENOUS
Status: DISCONTINUED | OUTPATIENT
Start: 2018-01-01 | End: 2018-01-01

## 2018-01-01 RX ORDER — LORAZEPAM 0.5 MG/1
0.5 TABLET ORAL
Status: DISCONTINUED | OUTPATIENT
Start: 2018-01-01 | End: 2018-01-01 | Stop reason: HOSPADM

## 2018-01-01 RX ORDER — CLOPIDOGREL BISULFATE 75 MG/1
75 TABLET ORAL DAILY
COMMUNITY
End: 2018-01-01

## 2018-01-01 RX ORDER — SODIUM CHLORIDE 9 MG/ML
15 INJECTION INTRAMUSCULAR; INTRAVENOUS; SUBCUTANEOUS ONCE
Status: COMPLETED | OUTPATIENT
Start: 2018-01-01 | End: 2018-01-01

## 2018-01-01 RX ORDER — SODIUM CHLORIDE, SODIUM LACTATE, POTASSIUM CHLORIDE, CALCIUM CHLORIDE 600; 310; 30; 20 MG/100ML; MG/100ML; MG/100ML; MG/100ML
50 INJECTION, SOLUTION INTRAVENOUS CONTINUOUS
Status: DISCONTINUED | OUTPATIENT
Start: 2018-01-01 | End: 2018-01-01

## 2018-01-01 RX ORDER — SIMVASTATIN 20 MG/1
20 TABLET, FILM COATED ORAL
Status: DISCONTINUED | OUTPATIENT
Start: 2018-01-01 | End: 2018-01-01 | Stop reason: HOSPADM

## 2018-01-01 RX ORDER — WARFARIN SODIUM 5 MG/1
5 TABLET ORAL ONCE
Status: COMPLETED | OUTPATIENT
Start: 2018-01-01 | End: 2018-01-01

## 2018-01-01 RX ORDER — EPINEPHRINE 0.1 MG/ML
1 INJECTION INTRACARDIAC; INTRAVENOUS
Status: CANCELLED | OUTPATIENT
Start: 2018-01-01 | End: 2018-01-01

## 2018-01-01 RX ORDER — POLYETHYLENE GLYCOL 3350 17 G/17G
17 POWDER, FOR SOLUTION ORAL DAILY
Qty: 1 PACKET | Refills: 0 | Status: SHIPPED
Start: 2018-01-01

## 2018-01-01 RX ORDER — SIMVASTATIN 20 MG/1
20 TABLET, FILM COATED ORAL
COMMUNITY
End: 2018-01-01

## 2018-01-01 RX ORDER — LISINOPRIL AND HYDROCHLOROTHIAZIDE 12.5; 2 MG/1; MG/1
1 TABLET ORAL DAILY
COMMUNITY
End: 2018-01-01

## 2018-01-01 RX ORDER — SODIUM CHLORIDE 9 MG/ML
INJECTION INTRAMUSCULAR; INTRAVENOUS; SUBCUTANEOUS
Status: DISPENSED
Start: 2018-01-01 | End: 2018-01-01

## 2018-01-01 RX ORDER — FENTANYL CITRATE 50 UG/ML
100 INJECTION, SOLUTION INTRAMUSCULAR; INTRAVENOUS
Status: DISCONTINUED | OUTPATIENT
Start: 2018-01-01 | End: 2018-01-01 | Stop reason: HOSPADM

## 2018-01-01 RX ORDER — ROCURONIUM BROMIDE 10 MG/ML
INJECTION, SOLUTION INTRAVENOUS AS NEEDED
Status: DISCONTINUED | OUTPATIENT
Start: 2018-01-01 | End: 2018-01-01 | Stop reason: HOSPADM

## 2018-01-01 RX ORDER — PROPOFOL 10 MG/ML
0-50 VIAL (ML) INTRAVENOUS
Status: DISCONTINUED | OUTPATIENT
Start: 2018-01-01 | End: 2018-01-01

## 2018-01-01 RX ORDER — ASPIRIN 300 MG/1
600 SUPPOSITORY RECTAL ONCE
Status: COMPLETED | OUTPATIENT
Start: 2018-01-01 | End: 2018-01-01

## 2018-01-01 RX ORDER — ALBUTEROL SULFATE 90 UG/1
1-2 AEROSOL, METERED RESPIRATORY (INHALATION)
Status: DISCONTINUED | OUTPATIENT
Start: 2018-01-01 | End: 2018-01-01 | Stop reason: HOSPADM

## 2018-01-01 RX ORDER — SODIUM CHLORIDE 0.9 % (FLUSH) 0.9 %
5-10 SYRINGE (ML) INJECTION AS NEEDED
Status: DISCONTINUED | OUTPATIENT
Start: 2018-01-01 | End: 2018-01-01

## 2018-01-01 RX ORDER — INSULIN LISPRO 100 [IU]/ML
INJECTION, SOLUTION INTRAVENOUS; SUBCUTANEOUS EVERY 6 HOURS
Status: DISCONTINUED | OUTPATIENT
Start: 2018-01-01 | End: 2018-01-01

## 2018-01-01 RX ORDER — ASPIRIN 325 MG
325 TABLET ORAL DAILY
Qty: 2 TAB | Refills: 0 | Status: SHIPPED
Start: 2018-01-01 | End: 2018-01-01

## 2018-01-01 RX ORDER — INSULIN LISPRO 100 [IU]/ML
INJECTION, SOLUTION INTRAVENOUS; SUBCUTANEOUS
Status: DISCONTINUED | OUTPATIENT
Start: 2018-01-01 | End: 2018-01-01

## 2018-01-01 RX ORDER — METOCLOPRAMIDE HYDROCHLORIDE 5 MG/ML
5 INJECTION INTRAMUSCULAR; INTRAVENOUS
Status: DISCONTINUED | OUTPATIENT
Start: 2018-01-01 | End: 2018-01-01 | Stop reason: HOSPADM

## 2018-01-01 RX ORDER — MAGNESIUM SULFATE 100 %
4 CRYSTALS MISCELLANEOUS AS NEEDED
Status: DISCONTINUED | OUTPATIENT
Start: 2018-01-01 | End: 2018-01-01 | Stop reason: SDUPTHER

## 2018-01-01 RX ORDER — IPRATROPIUM BROMIDE AND ALBUTEROL SULFATE 2.5; .5 MG/3ML; MG/3ML
3 SOLUTION RESPIRATORY (INHALATION)
Status: DISCONTINUED | OUTPATIENT
Start: 2018-01-01 | End: 2018-01-01 | Stop reason: HOSPADM

## 2018-01-01 RX ORDER — INSULIN LISPRO 100 [IU]/ML
INJECTION, SOLUTION INTRAVENOUS; SUBCUTANEOUS ONCE
Status: ACTIVE | OUTPATIENT
Start: 2018-01-01 | End: 2018-01-01

## 2018-01-01 RX ORDER — MONTELUKAST SODIUM 10 MG/1
10 TABLET ORAL
Status: DISCONTINUED | OUTPATIENT
Start: 2018-01-01 | End: 2018-01-01 | Stop reason: HOSPADM

## 2018-01-01 RX ORDER — ASPIRIN 325 MG
325 TABLET, DELAYED RELEASE (ENTERIC COATED) ORAL DAILY
Status: DISCONTINUED | OUTPATIENT
Start: 2018-01-01 | End: 2018-01-01

## 2018-01-01 RX ORDER — METRONIDAZOLE 250 MG/1
1000 TABLET ORAL AS NEEDED
Status: DISCONTINUED | OUTPATIENT
Start: 2018-01-01 | End: 2018-01-01 | Stop reason: CLARIF

## 2018-01-01 RX ORDER — ASPIRIN 300 MG/1
300 SUPPOSITORY RECTAL DAILY
Status: DISCONTINUED | OUTPATIENT
Start: 2018-01-01 | End: 2018-01-01

## 2018-01-01 RX ORDER — SODIUM CHLORIDE, SODIUM LACTATE, POTASSIUM CHLORIDE, CALCIUM CHLORIDE 600; 310; 30; 20 MG/100ML; MG/100ML; MG/100ML; MG/100ML
INJECTION, SOLUTION INTRAVENOUS
Status: DISCONTINUED | OUTPATIENT
Start: 2018-01-01 | End: 2018-01-01 | Stop reason: HOSPADM

## 2018-01-01 RX ORDER — DEXTROMETHORPHAN/PSEUDOEPHED 2.5-7.5/.8
1.2 DROPS ORAL
Status: DISCONTINUED | OUTPATIENT
Start: 2018-01-01 | End: 2018-01-01 | Stop reason: HOSPADM

## 2018-01-01 RX ORDER — LABETALOL HCL 20 MG/4 ML
5 SYRINGE (ML) INTRAVENOUS
Status: DISCONTINUED | OUTPATIENT
Start: 2018-01-01 | End: 2018-01-01 | Stop reason: HOSPADM

## 2018-01-01 RX ORDER — IPRATROPIUM BROMIDE AND ALBUTEROL SULFATE 2.5; .5 MG/3ML; MG/3ML
3 SOLUTION RESPIRATORY (INHALATION)
Status: DISCONTINUED | OUTPATIENT
Start: 2018-01-01 | End: 2018-01-01

## 2018-01-01 RX ORDER — PANTOPRAZOLE SODIUM 40 MG/1
40 TABLET, DELAYED RELEASE ORAL
Status: DISCONTINUED | OUTPATIENT
Start: 2018-01-01 | End: 2018-01-01

## 2018-01-01 RX ORDER — ESCITALOPRAM OXALATE 10 MG/1
10 TABLET ORAL DAILY
COMMUNITY

## 2018-01-01 RX ORDER — SODIUM CHLORIDE 0.9 % (FLUSH) 0.9 %
5-10 SYRINGE (ML) INJECTION EVERY 8 HOURS
Status: DISCONTINUED | OUTPATIENT
Start: 2018-01-01 | End: 2018-01-01 | Stop reason: HOSPADM

## 2018-01-01 RX ORDER — FLUTICASONE FUROATE AND VILANTEROL 100; 25 UG/1; UG/1
1 POWDER RESPIRATORY (INHALATION) DAILY
Qty: 1 INHALER | Refills: 1 | Status: SHIPPED
Start: 2018-01-01

## 2018-01-01 RX ORDER — MORPHINE SULFATE 20 MG/5ML
5 SOLUTION ORAL
Qty: 30 ML | Refills: 0 | Status: SHIPPED | OUTPATIENT
Start: 2018-01-01

## 2018-01-01 RX ORDER — FENTANYL CITRATE 50 UG/ML
25 INJECTION, SOLUTION INTRAMUSCULAR; INTRAVENOUS
Status: COMPLETED | OUTPATIENT
Start: 2018-01-01 | End: 2018-01-01

## 2018-01-01 RX ORDER — HYDROMORPHONE HYDROCHLORIDE 2 MG/ML
0.5 INJECTION, SOLUTION INTRAMUSCULAR; INTRAVENOUS; SUBCUTANEOUS
Status: DISCONTINUED | OUTPATIENT
Start: 2018-01-01 | End: 2018-01-01

## 2018-01-01 RX ORDER — DEXTROSE 50 % IN WATER (D50W) INTRAVENOUS SYRINGE
25-50 AS NEEDED
Status: DISCONTINUED | OUTPATIENT
Start: 2018-01-01 | End: 2018-01-01 | Stop reason: HOSPADM

## 2018-01-01 RX ORDER — SODIUM CHLORIDE 9 MG/ML
100 INJECTION, SOLUTION INTRAVENOUS CONTINUOUS
Status: DISPENSED | OUTPATIENT
Start: 2018-01-01 | End: 2018-01-01

## 2018-01-01 RX ORDER — DEXTROSE MONOHYDRATE AND SODIUM CHLORIDE 5; .9 G/100ML; G/100ML
100 INJECTION, SOLUTION INTRAVENOUS CONTINUOUS
Status: DISCONTINUED | OUTPATIENT
Start: 2018-01-01 | End: 2018-01-01

## 2018-01-01 RX ORDER — GLYCOPYRROLATE 0.2 MG/ML
INJECTION INTRAMUSCULAR; INTRAVENOUS AS NEEDED
Status: DISCONTINUED | OUTPATIENT
Start: 2018-01-01 | End: 2018-01-01 | Stop reason: HOSPADM

## 2018-01-01 RX ORDER — DEXTROSE 50 % IN WATER (D50W) INTRAVENOUS SYRINGE
25-50 AS NEEDED
Status: DISCONTINUED | OUTPATIENT
Start: 2018-01-01 | End: 2018-01-01 | Stop reason: SDUPTHER

## 2018-01-01 RX ORDER — FAMOTIDINE 20 MG/1
20 TABLET, FILM COATED ORAL 2 TIMES DAILY
Status: DISCONTINUED | OUTPATIENT
Start: 2018-01-01 | End: 2018-01-01

## 2018-01-01 RX ORDER — FUROSEMIDE 20 MG/1
20 TABLET ORAL DAILY
Status: DISCONTINUED | OUTPATIENT
Start: 2018-01-01 | End: 2018-01-01

## 2018-01-01 RX ORDER — SODIUM CHLORIDE 9 MG/ML
500 INJECTION, SOLUTION INTRAVENOUS ONCE
Status: COMPLETED | OUTPATIENT
Start: 2018-01-01 | End: 2018-01-01

## 2018-01-01 RX ORDER — LORAZEPAM 2 MG/ML
1 CONCENTRATE ORAL
Qty: 30 ML | Refills: 0 | Status: SHIPPED | OUTPATIENT
Start: 2018-01-01

## 2018-01-01 RX ORDER — MAGNESIUM SULFATE 100 %
16 CRYSTALS MISCELLANEOUS AS NEEDED
Status: DISCONTINUED | OUTPATIENT
Start: 2018-01-01 | End: 2018-01-01 | Stop reason: HOSPADM

## 2018-01-01 RX ORDER — DEXTROSE 50 % IN WATER (D50W) INTRAVENOUS SYRINGE
50 AS NEEDED
Status: DISCONTINUED | OUTPATIENT
Start: 2018-01-01 | End: 2018-01-01 | Stop reason: HOSPADM

## 2018-01-01 RX ORDER — ASPIRIN 325 MG
325 TABLET ORAL DAILY
Status: DISCONTINUED | OUTPATIENT
Start: 2018-01-01 | End: 2018-01-01 | Stop reason: HOSPADM

## 2018-01-01 RX ORDER — ATORVASTATIN CALCIUM 20 MG/1
40 TABLET, FILM COATED ORAL DAILY
Status: DISCONTINUED | OUTPATIENT
Start: 2018-01-01 | End: 2018-01-01 | Stop reason: HOSPADM

## 2018-01-01 RX ORDER — METOPROLOL TARTRATE 25 MG/1
12.5 TABLET, FILM COATED ORAL EVERY 12 HOURS
Qty: 2 TAB | Refills: 0 | Status: SHIPPED
Start: 2018-01-01 | End: 2018-01-01

## 2018-01-01 RX ORDER — ALBUTEROL SULFATE 0.83 MG/ML
2.5 SOLUTION RESPIRATORY (INHALATION) AS NEEDED
Status: DISCONTINUED | OUTPATIENT
Start: 2018-01-01 | End: 2018-01-01

## 2018-01-01 RX ORDER — HYDROMORPHONE HYDROCHLORIDE 2 MG/ML
INJECTION, SOLUTION INTRAMUSCULAR; INTRAVENOUS; SUBCUTANEOUS AS NEEDED
Status: DISCONTINUED | OUTPATIENT
Start: 2018-01-01 | End: 2018-01-01 | Stop reason: HOSPADM

## 2018-01-01 RX ORDER — ATROPINE SULFATE 0.1 MG/ML
0.5 INJECTION INTRAVENOUS
Status: CANCELLED | OUTPATIENT
Start: 2018-01-01 | End: 2018-01-01

## 2018-01-01 RX ORDER — OXYCODONE AND ACETAMINOPHEN 5; 325 MG/1; MG/1
1 TABLET ORAL
Status: DISCONTINUED | OUTPATIENT
Start: 2018-01-01 | End: 2018-01-01 | Stop reason: HOSPADM

## 2018-01-01 RX ORDER — SODIUM CHLORIDE, SODIUM LACTATE, POTASSIUM CHLORIDE, CALCIUM CHLORIDE 600; 310; 30; 20 MG/100ML; MG/100ML; MG/100ML; MG/100ML
1000 INJECTION, SOLUTION INTRAVENOUS CONTINUOUS
Status: DISCONTINUED | OUTPATIENT
Start: 2018-01-01 | End: 2018-01-01

## 2018-01-01 RX ORDER — DIPHENHYDRAMINE HYDROCHLORIDE 50 MG/ML
12.5 INJECTION, SOLUTION INTRAMUSCULAR; INTRAVENOUS
Status: DISCONTINUED | OUTPATIENT
Start: 2018-01-01 | End: 2018-01-01 | Stop reason: HOSPADM

## 2018-01-01 RX ORDER — MIDAZOLAM HYDROCHLORIDE 1 MG/ML
.5-5 INJECTION, SOLUTION INTRAMUSCULAR; INTRAVENOUS
Status: DISCONTINUED | OUTPATIENT
Start: 2018-01-01 | End: 2018-01-01 | Stop reason: HOSPADM

## 2018-01-01 RX ORDER — FUROSEMIDE 10 MG/ML
40 INJECTION INTRAMUSCULAR; INTRAVENOUS ONCE
Status: COMPLETED | OUTPATIENT
Start: 2018-01-01 | End: 2018-01-01

## 2018-01-01 RX ORDER — CITALOPRAM 10 MG/1
10 TABLET ORAL DAILY
COMMUNITY
End: 2018-01-01 | Stop reason: CLARIF

## 2018-01-01 RX ADMIN — POLYETHYLENE GLYCOL 3350 17 G: 17 POWDER, FOR SOLUTION ORAL at 08:36

## 2018-01-01 RX ADMIN — DEXTROSE MONOHYDRATE 25 G: 25 INJECTION, SOLUTION INTRAVENOUS at 12:25

## 2018-01-01 RX ADMIN — IPRATROPIUM BROMIDE 0.5 MG: 0.5 SOLUTION RESPIRATORY (INHALATION) at 19:56

## 2018-01-01 RX ADMIN — IOPAMIDOL 100 ML: 755 INJECTION, SOLUTION INTRAVENOUS at 18:14

## 2018-01-01 RX ADMIN — METRONIDAZOLE 1000 MG: 250 TABLET ORAL at 10:07

## 2018-01-01 RX ADMIN — INSULIN LISPRO 2 UNITS: 100 INJECTION, SOLUTION INTRAVENOUS; SUBCUTANEOUS at 05:46

## 2018-01-01 RX ADMIN — FLUTICASONE FUROATE AND VILANTEROL TRIFENATATE 1 PUFF: 100; 25 POWDER RESPIRATORY (INHALATION) at 11:22

## 2018-01-01 RX ADMIN — HEPARIN SODIUM AND DEXTROSE 15 UNITS/KG/HR: 10000; 5 INJECTION INTRAVENOUS at 22:25

## 2018-01-01 RX ADMIN — PANTOPRAZOLE SODIUM 40 MG: 40 TABLET, DELAYED RELEASE ORAL at 07:01

## 2018-01-01 RX ADMIN — POLYETHYLENE GLYCOL 3350 17 G: 17 POWDER, FOR SOLUTION ORAL at 22:38

## 2018-01-01 RX ADMIN — ATORVASTATIN CALCIUM 80 MG: 20 TABLET, FILM COATED ORAL at 09:58

## 2018-01-01 RX ADMIN — FLUTICASONE PROPIONATE 2 SPRAY: 50 SPRAY, METERED NASAL at 10:20

## 2018-01-01 RX ADMIN — POLYETHYLENE GLYCOL 3350 17 G: 17 POWDER, FOR SOLUTION ORAL at 22:42

## 2018-01-01 RX ADMIN — IPRATROPIUM BROMIDE 0.5 MG: 0.5 SOLUTION RESPIRATORY (INHALATION) at 15:14

## 2018-01-01 RX ADMIN — ASPIRIN 325 MG ORAL TABLET 325 MG: 325 PILL ORAL at 09:48

## 2018-01-01 RX ADMIN — HEPARIN SODIUM 5000 UNITS: 5000 INJECTION INTRAVENOUS; SUBCUTANEOUS at 02:17

## 2018-01-01 RX ADMIN — FLUTICASONE PROPIONATE 2 SPRAY: 50 SPRAY, METERED NASAL at 10:48

## 2018-01-01 RX ADMIN — NEOMYCIN SULFATE 1000 MG: 500 TABLET ORAL at 10:07

## 2018-01-01 RX ADMIN — SODIUM CHLORIDE, SODIUM LACTATE, POTASSIUM CHLORIDE, CALCIUM CHLORIDE: 600; 310; 30; 20 INJECTION, SOLUTION INTRAVENOUS at 19:44

## 2018-01-01 RX ADMIN — DEXTROSE MONOHYDRATE 25 G: 25 INJECTION, SOLUTION INTRAVENOUS at 16:41

## 2018-01-01 RX ADMIN — ATORVASTATIN CALCIUM 80 MG: 20 TABLET, FILM COATED ORAL at 10:02

## 2018-01-01 RX ADMIN — APIXABAN 10 MG: 5 TABLET, FILM COATED ORAL at 08:54

## 2018-01-01 RX ADMIN — ATORVASTATIN CALCIUM 80 MG: 20 TABLET, FILM COATED ORAL at 09:19

## 2018-01-01 RX ADMIN — OXYCODONE HYDROCHLORIDE AND ACETAMINOPHEN 1 TABLET: 5; 325 TABLET ORAL at 04:53

## 2018-01-01 RX ADMIN — SODIUM CHLORIDE 100 ML/HR: 900 INJECTION, SOLUTION INTRAVENOUS at 20:22

## 2018-01-01 RX ADMIN — GLYCOPYRROLATE 0.2 MG: 0.2 INJECTION INTRAMUSCULAR; INTRAVENOUS at 19:48

## 2018-01-01 RX ADMIN — POLYETHYLENE GLYCOL 3350 17 G: 17 POWDER, FOR SOLUTION ORAL at 08:49

## 2018-01-01 RX ADMIN — SODIUM CHLORIDE 40 MG: 9 INJECTION INTRAMUSCULAR; INTRAVENOUS; SUBCUTANEOUS at 21:43

## 2018-01-01 RX ADMIN — POLYETHYLENE GLYCOL 3350 17 G: 17 POWDER, FOR SOLUTION ORAL at 09:19

## 2018-01-01 RX ADMIN — ASPIRIN 600 MG: 300 SUPPOSITORY RECTAL at 11:57

## 2018-01-01 RX ADMIN — HEPARIN SODIUM 7620 UNITS: 1000 INJECTION INTRAVENOUS; SUBCUTANEOUS at 21:15

## 2018-01-01 RX ADMIN — SODIUM CHLORIDE 40 MG: 9 INJECTION INTRAMUSCULAR; INTRAVENOUS; SUBCUTANEOUS at 09:48

## 2018-01-01 RX ADMIN — SODIUM CHLORIDE 1 G: 900 INJECTION INTRAVENOUS at 19:54

## 2018-01-01 RX ADMIN — INSULIN LISPRO 2 UNITS: 100 INJECTION, SOLUTION INTRAVENOUS; SUBCUTANEOUS at 17:11

## 2018-01-01 RX ADMIN — Medication 10 ML: at 21:43

## 2018-01-01 RX ADMIN — IPRATROPIUM BROMIDE AND ALBUTEROL SULFATE 3 ML: .5; 3 SOLUTION RESPIRATORY (INHALATION) at 19:52

## 2018-01-01 RX ADMIN — IPRATROPIUM BROMIDE 0.5 MG: 0.5 SOLUTION RESPIRATORY (INHALATION) at 08:13

## 2018-01-01 RX ADMIN — Medication 10 ML: at 21:50

## 2018-01-01 RX ADMIN — Medication 10 ML: at 13:40

## 2018-01-01 RX ADMIN — HEPARIN SODIUM AND DEXTROSE 18 UNITS/KG/HR: 10000; 5 INJECTION INTRAVENOUS at 21:13

## 2018-01-01 RX ADMIN — Medication 10 ML: at 07:40

## 2018-01-01 RX ADMIN — IPRATROPIUM BROMIDE 0.5 MG: 0.5 SOLUTION RESPIRATORY (INHALATION) at 11:05

## 2018-01-01 RX ADMIN — FAMOTIDINE 20 MG: 10 INJECTION, SOLUTION INTRAVENOUS at 08:52

## 2018-01-01 RX ADMIN — FENTANYL CITRATE 25 MCG: 50 INJECTION, SOLUTION INTRAMUSCULAR; INTRAVENOUS at 22:41

## 2018-01-01 RX ADMIN — FENTANYL CITRATE 25 MCG: 50 INJECTION, SOLUTION INTRAMUSCULAR; INTRAVENOUS at 15:10

## 2018-01-01 RX ADMIN — IPRATROPIUM BROMIDE 0.5 MG: 0.5 SOLUTION RESPIRATORY (INHALATION) at 11:17

## 2018-01-01 RX ADMIN — HEPARIN SODIUM 5000 UNITS: 5000 INJECTION INTRAVENOUS; SUBCUTANEOUS at 14:55

## 2018-01-01 RX ADMIN — APIXABAN 10 MG: 5 TABLET, FILM COATED ORAL at 12:20

## 2018-01-01 RX ADMIN — FUROSEMIDE 40 MG: 10 INJECTION, SOLUTION INTRAMUSCULAR; INTRAVENOUS at 10:18

## 2018-01-01 RX ADMIN — IPRATROPIUM BROMIDE AND ALBUTEROL SULFATE 3 ML: .5; 3 SOLUTION RESPIRATORY (INHALATION) at 11:47

## 2018-01-01 RX ADMIN — SODIUM CHLORIDE 75 ML/HR: 900 INJECTION, SOLUTION INTRAVENOUS at 14:55

## 2018-01-01 RX ADMIN — REGADENOSON 0.4 MG: 0.08 INJECTION, SOLUTION INTRAVENOUS at 09:47

## 2018-01-01 RX ADMIN — Medication 10 ML: at 22:00

## 2018-01-01 RX ADMIN — PROPOFOL 25 MCG/KG/MIN: 10 INJECTION, EMULSION INTRAVENOUS at 00:06

## 2018-01-01 RX ADMIN — Medication 10 ML: at 16:19

## 2018-01-01 RX ADMIN — DEXTROSE MONOHYDRATE AND SODIUM CHLORIDE 125 ML/HR: 5; .45 INJECTION, SOLUTION INTRAVENOUS at 08:28

## 2018-01-01 RX ADMIN — IPRATROPIUM BROMIDE 0.5 MG: 0.5 SOLUTION RESPIRATORY (INHALATION) at 11:32

## 2018-01-01 RX ADMIN — INSULIN LISPRO 2 UNITS: 100 INJECTION, SOLUTION INTRAVENOUS; SUBCUTANEOUS at 11:30

## 2018-01-01 RX ADMIN — INSULIN LISPRO 2 UNITS: 100 INJECTION, SOLUTION INTRAVENOUS; SUBCUTANEOUS at 16:19

## 2018-01-01 RX ADMIN — HEPARIN SODIUM 5000 UNITS: 5000 INJECTION INTRAVENOUS; SUBCUTANEOUS at 17:35

## 2018-01-01 RX ADMIN — Medication 10 ML: at 10:03

## 2018-01-01 RX ADMIN — FAMOTIDINE 20 MG: 10 INJECTION, SOLUTION INTRAVENOUS at 08:53

## 2018-01-01 RX ADMIN — SODIUM CHLORIDE 40 MG: 9 INJECTION INTRAMUSCULAR; INTRAVENOUS; SUBCUTANEOUS at 20:20

## 2018-01-01 RX ADMIN — POLYETHYLENE GLYCOL 3350 17 G: 17 POWDER, FOR SOLUTION ORAL at 21:00

## 2018-01-01 RX ADMIN — HEPARIN SODIUM 5000 UNITS: 5000 INJECTION INTRAVENOUS; SUBCUTANEOUS at 17:07

## 2018-01-01 RX ADMIN — INSULIN LISPRO 2 UNITS: 100 INJECTION, SOLUTION INTRAVENOUS; SUBCUTANEOUS at 12:39

## 2018-01-01 RX ADMIN — Medication 10 ML: at 17:13

## 2018-01-01 RX ADMIN — HEPARIN SODIUM 5000 UNITS: 5000 INJECTION INTRAVENOUS; SUBCUTANEOUS at 03:53

## 2018-01-01 RX ADMIN — ASPIRIN 325 MG ORAL TABLET 325 MG: 325 PILL ORAL at 10:01

## 2018-01-01 RX ADMIN — PROPOFOL 90 MG: 10 INJECTION, EMULSION INTRAVENOUS at 19:55

## 2018-01-01 RX ADMIN — FAMOTIDINE 20 MG: 10 INJECTION, SOLUTION INTRAVENOUS at 12:29

## 2018-01-01 RX ADMIN — HEPARIN SODIUM 5000 UNITS: 5000 INJECTION INTRAVENOUS; SUBCUTANEOUS at 09:00

## 2018-01-01 RX ADMIN — PANTOPRAZOLE SODIUM 40 MG: 40 TABLET, DELAYED RELEASE ORAL at 06:41

## 2018-01-01 RX ADMIN — FLUTICASONE FUROATE AND VILANTEROL TRIFENATATE 1 PUFF: 100; 25 POWDER RESPIRATORY (INHALATION) at 12:50

## 2018-01-01 RX ADMIN — IPRATROPIUM BROMIDE 0.5 MG: 0.5 SOLUTION RESPIRATORY (INHALATION) at 15:49

## 2018-01-01 RX ADMIN — ASPIRIN 325 MG ORAL TABLET 325 MG: 325 PILL ORAL at 09:19

## 2018-01-01 RX ADMIN — IPRATROPIUM BROMIDE 0.5 MG: 0.5 SOLUTION RESPIRATORY (INHALATION) at 07:20

## 2018-01-01 RX ADMIN — IPRATROPIUM BROMIDE 0.5 MG: 0.5 SOLUTION RESPIRATORY (INHALATION) at 15:34

## 2018-01-01 RX ADMIN — CITROMA MAGNESIUM CITRATE 296 ML: 1.75 LIQUID ORAL at 06:18

## 2018-01-01 RX ADMIN — ASPIRIN 325 MG ORAL TABLET 325 MG: 325 PILL ORAL at 10:28

## 2018-01-01 RX ADMIN — POLYETHYLENE GLYCOL 3350 17 G: 17 POWDER, FOR SOLUTION ORAL at 10:45

## 2018-01-01 RX ADMIN — FENTANYL CITRATE 100 MCG: 50 INJECTION, SOLUTION INTRAMUSCULAR; INTRAVENOUS at 23:33

## 2018-01-01 RX ADMIN — IPRATROPIUM BROMIDE 0.5 MG: 0.5 SOLUTION RESPIRATORY (INHALATION) at 07:13

## 2018-01-01 RX ADMIN — FLUTICASONE PROPIONATE 2 SPRAY: 50 SPRAY, METERED NASAL at 08:32

## 2018-01-01 RX ADMIN — FLUTICASONE FUROATE AND VILANTEROL TRIFENATATE 1 PUFF: 100; 25 POWDER RESPIRATORY (INHALATION) at 10:20

## 2018-01-01 RX ADMIN — Medication 10 ML: at 14:00

## 2018-01-01 RX ADMIN — IPRATROPIUM BROMIDE 0.5 MG: 0.5 SOLUTION RESPIRATORY (INHALATION) at 07:14

## 2018-01-01 RX ADMIN — IPRATROPIUM BROMIDE 0.5 MG: 0.5 SOLUTION RESPIRATORY (INHALATION) at 21:26

## 2018-01-01 RX ADMIN — Medication 10 ML: at 06:00

## 2018-01-01 RX ADMIN — SODIUM CHLORIDE 100 ML/HR: 900 INJECTION, SOLUTION INTRAVENOUS at 12:37

## 2018-01-01 RX ADMIN — MONTELUKAST SODIUM 10 MG: 10 TABLET, COATED ORAL at 22:00

## 2018-01-01 RX ADMIN — POLYETHYLENE GLYCOL 3350 17 G: 17 POWDER, FOR SOLUTION ORAL at 10:04

## 2018-01-01 RX ADMIN — INSULIN LISPRO 2 UNITS: 100 INJECTION, SOLUTION INTRAVENOUS; SUBCUTANEOUS at 12:20

## 2018-01-01 RX ADMIN — Medication 10 ML: at 22:42

## 2018-01-01 RX ADMIN — INSULIN LISPRO 2 UNITS: 100 INJECTION, SOLUTION INTRAVENOUS; SUBCUTANEOUS at 13:19

## 2018-01-01 RX ADMIN — ASPIRIN 325 MG ORAL TABLET 325 MG: 325 PILL ORAL at 08:35

## 2018-01-01 RX ADMIN — FLUTICASONE FUROATE AND VILANTEROL TRIFENATATE 1 PUFF: 100; 25 POWDER RESPIRATORY (INHALATION) at 13:51

## 2018-01-01 RX ADMIN — ALBUTEROL SULFATE 2.5 MG: 2.5 SOLUTION RESPIRATORY (INHALATION) at 08:23

## 2018-01-01 RX ADMIN — Medication 10 ML: at 17:02

## 2018-01-01 RX ADMIN — ASPIRIN 300 MG: 300 SUPPOSITORY RECTAL at 08:58

## 2018-01-01 RX ADMIN — SODIUM CHLORIDE 40 MG: 9 INJECTION INTRAMUSCULAR; INTRAVENOUS; SUBCUTANEOUS at 22:38

## 2018-01-01 RX ADMIN — FENTANYL CITRATE 50 MCG: 50 INJECTION, SOLUTION INTRAMUSCULAR; INTRAVENOUS at 19:56

## 2018-01-01 RX ADMIN — POLYETHYLENE GLYCOL 3350 17 G: 17 POWDER, FOR SOLUTION ORAL at 08:35

## 2018-01-01 RX ADMIN — POLYETHYLENE GLYCOL 3350 17 G: 17 POWDER, FOR SOLUTION ORAL at 20:47

## 2018-01-01 RX ADMIN — SIMVASTATIN 20 MG: 20 TABLET, FILM COATED ORAL at 21:54

## 2018-01-01 RX ADMIN — Medication 10 ML: at 14:39

## 2018-01-01 RX ADMIN — INSULIN LISPRO 6 UNITS: 100 INJECTION, SOLUTION INTRAVENOUS; SUBCUTANEOUS at 15:29

## 2018-01-01 RX ADMIN — METOPROLOL TARTRATE 12.5 MG: 25 TABLET ORAL at 08:50

## 2018-01-01 RX ADMIN — IPRATROPIUM BROMIDE 0.5 MG: 0.5 SOLUTION RESPIRATORY (INHALATION) at 15:23

## 2018-01-01 RX ADMIN — LIDOCAINE HYDROCHLORIDE 100 MG: 20 INJECTION, SOLUTION EPIDURAL; INFILTRATION; INTRACAUDAL; PERINEURAL at 19:55

## 2018-01-01 RX ADMIN — PANTOPRAZOLE SODIUM 40 MG: 40 TABLET, DELAYED RELEASE ORAL at 06:38

## 2018-01-01 RX ADMIN — IPRATROPIUM BROMIDE 0.5 MG: 0.5 SOLUTION RESPIRATORY (INHALATION) at 20:26

## 2018-01-01 RX ADMIN — Medication 10 ML: at 21:02

## 2018-01-01 RX ADMIN — OXYCODONE HYDROCHLORIDE AND ACETAMINOPHEN 1 TABLET: 5; 325 TABLET ORAL at 14:43

## 2018-01-01 RX ADMIN — PROPOFOL 30 MCG/KG/MIN: 10 INJECTION, EMULSION INTRAVENOUS at 06:12

## 2018-01-01 RX ADMIN — FAMOTIDINE 20 MG: 20 TABLET ORAL at 11:22

## 2018-01-01 RX ADMIN — INSULIN LISPRO 4 UNITS: 100 INJECTION, SOLUTION INTRAVENOUS; SUBCUTANEOUS at 23:26

## 2018-01-01 RX ADMIN — FLUTICASONE PROPIONATE 2 SPRAY: 50 SPRAY, METERED NASAL at 09:19

## 2018-01-01 RX ADMIN — MIDAZOLAM HYDROCHLORIDE 2 MG: 1 INJECTION, SOLUTION INTRAMUSCULAR; INTRAVENOUS at 19:48

## 2018-01-01 RX ADMIN — ATORVASTATIN CALCIUM 80 MG: 20 TABLET, FILM COATED ORAL at 08:21

## 2018-01-01 RX ADMIN — SIMVASTATIN 20 MG: 20 TABLET, FILM COATED ORAL at 22:42

## 2018-01-01 RX ADMIN — MORPHINE SULFATE 4 MG: 2 INJECTION, SOLUTION INTRAMUSCULAR; INTRAVENOUS at 14:25

## 2018-01-01 RX ADMIN — SODIUM CHLORIDE, SODIUM LACTATE, POTASSIUM CHLORIDE, AND CALCIUM CHLORIDE 50 ML/HR: 600; 310; 30; 20 INJECTION, SOLUTION INTRAVENOUS at 00:39

## 2018-01-01 RX ADMIN — INSULIN LISPRO 2 UNITS: 100 INJECTION, SOLUTION INTRAVENOUS; SUBCUTANEOUS at 11:37

## 2018-01-01 RX ADMIN — Medication 10 ML: at 13:20

## 2018-01-01 RX ADMIN — NEOMYCIN SULFATE 1000 MG: 500 TABLET ORAL at 11:01

## 2018-01-01 RX ADMIN — INSULIN LISPRO 2 UNITS: 100 INJECTION, SOLUTION INTRAVENOUS; SUBCUTANEOUS at 21:54

## 2018-01-01 RX ADMIN — INSULIN LISPRO 2 UNITS: 100 INJECTION, SOLUTION INTRAVENOUS; SUBCUTANEOUS at 00:05

## 2018-01-01 RX ADMIN — ASPIRIN 325 MG ORAL TABLET 325 MG: 325 PILL ORAL at 10:44

## 2018-01-01 RX ADMIN — Medication 10 ML: at 19:30

## 2018-01-01 RX ADMIN — IPRATROPIUM BROMIDE AND ALBUTEROL SULFATE 3 ML: .5; 3 SOLUTION RESPIRATORY (INHALATION) at 12:16

## 2018-01-01 RX ADMIN — ATORVASTATIN CALCIUM 80 MG: 20 TABLET, FILM COATED ORAL at 08:35

## 2018-01-01 RX ADMIN — SIMVASTATIN 20 MG: 20 TABLET, FILM COATED ORAL at 22:47

## 2018-01-01 RX ADMIN — PANTOPRAZOLE SODIUM 40 MG: 40 TABLET, DELAYED RELEASE ORAL at 06:50

## 2018-01-01 RX ADMIN — METRONIDAZOLE 1000 MG: 250 TABLET ORAL at 11:01

## 2018-01-01 RX ADMIN — POLYETHYLENE GLYCOL 3350 17 G: 17 POWDER, FOR SOLUTION ORAL at 22:47

## 2018-01-01 RX ADMIN — PROPOFOL 110 MG: 10 INJECTION, EMULSION INTRAVENOUS at 21:26

## 2018-01-01 RX ADMIN — Medication 10 ML: at 21:32

## 2018-01-01 RX ADMIN — METOPROLOL TARTRATE 12.5 MG: 25 TABLET ORAL at 10:03

## 2018-01-01 RX ADMIN — FAMOTIDINE 20 MG: 10 INJECTION, SOLUTION INTRAVENOUS at 13:49

## 2018-01-01 RX ADMIN — IPRATROPIUM BROMIDE AND ALBUTEROL SULFATE 3 ML: .5; 3 SOLUTION RESPIRATORY (INHALATION) at 15:27

## 2018-01-01 RX ADMIN — ASPIRIN 325 MG ORAL TABLET 325 MG: 325 PILL ORAL at 10:45

## 2018-01-01 RX ADMIN — SODIUM CHLORIDE 500 ML: 900 INJECTION, SOLUTION INTRAVENOUS at 02:17

## 2018-01-01 RX ADMIN — IPRATROPIUM BROMIDE AND ALBUTEROL SULFATE 3 ML: .5; 3 SOLUTION RESPIRATORY (INHALATION) at 08:04

## 2018-01-01 RX ADMIN — SODIUM CHLORIDE 250 ML: 900 INJECTION, SOLUTION INTRAVENOUS at 19:20

## 2018-01-01 RX ADMIN — ATORVASTATIN CALCIUM 80 MG: 20 TABLET, FILM COATED ORAL at 10:45

## 2018-01-01 RX ADMIN — DEXTROSE MONOHYDRATE AND SODIUM CHLORIDE 125 ML/HR: 5; .45 INJECTION, SOLUTION INTRAVENOUS at 17:07

## 2018-01-01 RX ADMIN — IPRATROPIUM BROMIDE 0.5 MG: 0.5 SOLUTION RESPIRATORY (INHALATION) at 07:21

## 2018-01-01 RX ADMIN — Medication 10 ML: at 06:18

## 2018-01-01 RX ADMIN — MONTELUKAST SODIUM 10 MG: 10 TABLET, COATED ORAL at 21:43

## 2018-01-01 RX ADMIN — POLYETHYLENE GLYCOL 3350 17 G: 17 POWDER, FOR SOLUTION ORAL at 23:23

## 2018-01-01 RX ADMIN — IOPAMIDOL 80 ML: 755 INJECTION, SOLUTION INTRAVENOUS at 12:00

## 2018-01-01 RX ADMIN — ASPIRIN 325 MG: 325 TABLET, DELAYED RELEASE ORAL at 08:52

## 2018-01-01 RX ADMIN — HEPARIN SODIUM 5000 UNITS: 5000 INJECTION INTRAVENOUS; SUBCUTANEOUS at 08:52

## 2018-01-01 RX ADMIN — Medication 10 ML: at 11:44

## 2018-01-01 RX ADMIN — POLYETHYLENE GLYCOL 3350 17 G: 17 POWDER, FOR SOLUTION ORAL at 13:49

## 2018-01-01 RX ADMIN — POLYETHYLENE GLYCOL 3350 17 G: 17 POWDER, FOR SOLUTION ORAL at 21:26

## 2018-01-01 RX ADMIN — INSULIN LISPRO 2 UNITS: 100 INJECTION, SOLUTION INTRAVENOUS; SUBCUTANEOUS at 07:45

## 2018-01-01 RX ADMIN — FENTANYL CITRATE 50 MCG: 50 INJECTION, SOLUTION INTRAMUSCULAR; INTRAVENOUS at 19:53

## 2018-01-01 RX ADMIN — INSULIN LISPRO 2 UNITS: 100 INJECTION, SOLUTION INTRAVENOUS; SUBCUTANEOUS at 05:48

## 2018-01-01 RX ADMIN — SODIUM CHLORIDE 100 ML/HR: 900 INJECTION, SOLUTION INTRAVENOUS at 21:48

## 2018-01-01 RX ADMIN — ASPIRIN 325 MG ORAL TABLET 325 MG: 325 PILL ORAL at 08:50

## 2018-01-01 RX ADMIN — ATORVASTATIN CALCIUM 80 MG: 20 TABLET, FILM COATED ORAL at 09:54

## 2018-01-01 RX ADMIN — FLUTICASONE PROPIONATE 2 SPRAY: 50 SPRAY, METERED NASAL at 09:26

## 2018-01-01 RX ADMIN — INSULIN LISPRO 4 UNITS: 100 INJECTION, SOLUTION INTRAVENOUS; SUBCUTANEOUS at 13:48

## 2018-01-01 RX ADMIN — Medication 10 ML: at 18:19

## 2018-01-01 RX ADMIN — SIMVASTATIN 20 MG: 20 TABLET, FILM COATED ORAL at 21:26

## 2018-01-01 RX ADMIN — Medication 10 ML: at 00:15

## 2018-01-01 RX ADMIN — FAMOTIDINE 20 MG: 20 TABLET ORAL at 10:01

## 2018-01-01 RX ADMIN — INSULIN LISPRO 8 UNITS: 100 INJECTION, SOLUTION INTRAVENOUS; SUBCUTANEOUS at 12:11

## 2018-01-01 RX ADMIN — SODIUM CHLORIDE, SODIUM LACTATE, POTASSIUM CHLORIDE, CALCIUM CHLORIDE: 600; 310; 30; 20 INJECTION, SOLUTION INTRAVENOUS at 21:17

## 2018-01-01 RX ADMIN — RANITIDINE 150 MG: 150 TABLET ORAL at 14:55

## 2018-01-01 RX ADMIN — Medication 10 ML: at 22:23

## 2018-01-01 RX ADMIN — ATORVASTATIN CALCIUM 80 MG: 20 TABLET, FILM COATED ORAL at 09:48

## 2018-01-01 RX ADMIN — POLYETHYLENE GLYCOL 3350 17 G: 17 POWDER, FOR SOLUTION ORAL at 09:58

## 2018-01-01 RX ADMIN — POLYETHYLENE GLYCOL 3350 17 G: 17 POWDER, FOR SOLUTION ORAL at 08:21

## 2018-01-01 RX ADMIN — FLUTICASONE FUROATE AND VILANTEROL TRIFENATATE 1 PUFF: 100; 25 POWDER RESPIRATORY (INHALATION) at 09:10

## 2018-01-01 RX ADMIN — IPRATROPIUM BROMIDE 0.5 MG: 0.5 SOLUTION RESPIRATORY (INHALATION) at 11:19

## 2018-01-01 RX ADMIN — INSULIN LISPRO 2 UNITS: 100 INJECTION, SOLUTION INTRAVENOUS; SUBCUTANEOUS at 19:15

## 2018-01-01 RX ADMIN — ATORVASTATIN CALCIUM 40 MG: 20 TABLET, FILM COATED ORAL at 12:20

## 2018-01-01 RX ADMIN — HYDROCODONE BITARTRATE AND ACETAMINOPHEN 1 TABLET: 5; 325 TABLET ORAL at 10:00

## 2018-01-01 RX ADMIN — ATORVASTATIN CALCIUM 40 MG: 20 TABLET, FILM COATED ORAL at 08:53

## 2018-01-01 RX ADMIN — IPRATROPIUM BROMIDE 0.5 MG: 0.5 SOLUTION RESPIRATORY (INHALATION) at 20:23

## 2018-01-01 RX ADMIN — SIMVASTATIN 20 MG: 20 TABLET, FILM COATED ORAL at 21:01

## 2018-01-01 RX ADMIN — POLYETHYLENE GLYCOL 3350 17 G: 17 POWDER, FOR SOLUTION ORAL at 21:58

## 2018-01-01 RX ADMIN — METOPROLOL TARTRATE 12.5 MG: 25 TABLET ORAL at 09:31

## 2018-01-01 RX ADMIN — OXYCODONE HYDROCHLORIDE AND ACETAMINOPHEN 1 TABLET: 5; 325 TABLET ORAL at 10:57

## 2018-01-01 RX ADMIN — Medication 10 ML: at 05:47

## 2018-01-01 RX ADMIN — FAMOTIDINE 20 MG: 10 INJECTION, SOLUTION INTRAVENOUS at 09:08

## 2018-01-01 RX ADMIN — ROCURONIUM BROMIDE 50 MG: 10 INJECTION, SOLUTION INTRAVENOUS at 21:26

## 2018-01-01 RX ADMIN — POLYETHYLENE GLYCOL 3350 17 G: 17 POWDER, FOR SOLUTION ORAL at 09:45

## 2018-01-01 RX ADMIN — SODIUM CHLORIDE 500 ML: 900 INJECTION, SOLUTION INTRAVENOUS at 21:37

## 2018-01-01 RX ADMIN — SIMVASTATIN 20 MG: 20 TABLET, FILM COATED ORAL at 23:23

## 2018-01-01 RX ADMIN — WARFARIN SODIUM 5 MG: 5 TABLET ORAL at 17:35

## 2018-01-01 RX ADMIN — DEXTROSE MONOHYDRATE AND SODIUM CHLORIDE 125 ML/HR: 5; .45 INJECTION, SOLUTION INTRAVENOUS at 00:00

## 2018-01-01 RX ADMIN — INSULIN LISPRO 2 UNITS: 100 INJECTION, SOLUTION INTRAVENOUS; SUBCUTANEOUS at 22:48

## 2018-01-01 RX ADMIN — FLUTICASONE FUROATE AND VILANTEROL TRIFENATATE 1 PUFF: 100; 25 POWDER RESPIRATORY (INHALATION) at 09:19

## 2018-01-01 RX ADMIN — Medication 10 ML: at 05:49

## 2018-01-01 RX ADMIN — Medication 10 ML: at 21:01

## 2018-01-01 RX ADMIN — APIXABAN 10 MG: 5 TABLET, FILM COATED ORAL at 20:47

## 2018-01-01 RX ADMIN — FAMOTIDINE 20 MG: 20 TABLET ORAL at 08:50

## 2018-01-01 RX ADMIN — FLUTICASONE PROPIONATE 2 SPRAY: 50 SPRAY, METERED NASAL at 11:31

## 2018-01-01 RX ADMIN — SODIUM CHLORIDE 1000 ML: 900 INJECTION, SOLUTION INTRAVENOUS at 11:48

## 2018-01-01 RX ADMIN — HYDROMORPHONE HYDROCHLORIDE 1 MG: 2 INJECTION, SOLUTION INTRAMUSCULAR; INTRAVENOUS; SUBCUTANEOUS at 22:25

## 2018-01-01 RX ADMIN — SIMVASTATIN 20 MG: 20 TABLET, FILM COATED ORAL at 21:00

## 2018-01-01 RX ADMIN — SODIUM CHLORIDE 15 ML: 9 INJECTION, SOLUTION INTRAMUSCULAR; INTRAVENOUS; SUBCUTANEOUS at 14:42

## 2018-01-01 RX ADMIN — ROCURONIUM BROMIDE 20 MG: 10 INJECTION, SOLUTION INTRAVENOUS at 23:32

## 2018-01-01 RX ADMIN — Medication 10 ML: at 17:07

## 2018-01-01 RX ADMIN — ATORVASTATIN CALCIUM 80 MG: 20 TABLET, FILM COATED ORAL at 09:44

## 2018-01-01 RX ADMIN — ASPIRIN 325 MG ORAL TABLET 325 MG: 325 PILL ORAL at 08:36

## 2018-01-01 RX ADMIN — INSULIN LISPRO 2 UNITS: 100 INJECTION, SOLUTION INTRAVENOUS; SUBCUTANEOUS at 12:33

## 2018-01-01 RX ADMIN — ATORVASTATIN CALCIUM 80 MG: 20 TABLET, FILM COATED ORAL at 14:55

## 2018-01-01 RX ADMIN — FENTANYL CITRATE 25 MCG: 50 INJECTION, SOLUTION INTRAMUSCULAR; INTRAVENOUS at 11:32

## 2018-01-01 RX ADMIN — ONDANSETRON 4 MG: 2 INJECTION INTRAMUSCULAR; INTRAVENOUS at 19:48

## 2018-01-01 RX ADMIN — DIATRIZOATE MEGLUMINE AND DIATRIZOATE SODIUM 30 ML: 660; 100 LIQUID ORAL; RECTAL at 15:18

## 2018-01-01 RX ADMIN — FLUTICASONE PROPIONATE 2 SPRAY: 50 SPRAY, METERED NASAL at 08:35

## 2018-01-01 RX ADMIN — MONTELUKAST SODIUM 10 MG: 10 TABLET, COATED ORAL at 21:15

## 2018-01-01 RX ADMIN — ASPIRIN 325 MG ORAL TABLET 325 MG: 325 PILL ORAL at 12:14

## 2018-01-01 RX ADMIN — ROCURONIUM BROMIDE 50 MG: 10 INJECTION, SOLUTION INTRAVENOUS at 19:54

## 2018-01-01 RX ADMIN — INSULIN LISPRO 2 UNITS: 100 INJECTION, SOLUTION INTRAVENOUS; SUBCUTANEOUS at 13:09

## 2018-01-01 RX ADMIN — POLYETHYLENE GLYCOL 3350, SODIUM CHLORIDE, POTASSIUM CHLORIDE, SODIUM BICARBONATE, AND SODIUM SULFATE 4000 ML: 240; 5.84; 2.98; 6.72; 22.72 POWDER, FOR SOLUTION ORAL at 17:13

## 2018-01-01 RX ADMIN — SODIUM CHLORIDE, SODIUM LACTATE, POTASSIUM CHLORIDE, AND CALCIUM CHLORIDE 125 ML/HR: 600; 310; 30; 20 INJECTION, SOLUTION INTRAVENOUS at 14:07

## 2018-01-01 RX ADMIN — SODIUM CHLORIDE, SODIUM LACTATE, POTASSIUM CHLORIDE, CALCIUM CHLORIDE: 600; 310; 30; 20 INJECTION, SOLUTION INTRAVENOUS at 22:19

## 2018-01-01 RX ADMIN — ATORVASTATIN CALCIUM 80 MG: 20 TABLET, FILM COATED ORAL at 08:49

## 2018-01-01 RX ADMIN — HEPARIN SODIUM 5000 UNITS: 5000 INJECTION INTRAVENOUS; SUBCUTANEOUS at 23:26

## 2018-01-01 RX ADMIN — ASPIRIN 325 MG ORAL TABLET 325 MG: 325 PILL ORAL at 09:44

## 2018-01-01 RX ADMIN — SODIUM CHLORIDE, SODIUM LACTATE, POTASSIUM CHLORIDE, AND CALCIUM CHLORIDE: 600; 310; 30; 20 INJECTION, SOLUTION INTRAVENOUS at 22:54

## 2018-01-01 RX ADMIN — HEPARIN SODIUM 5000 UNITS: 5000 INJECTION INTRAVENOUS; SUBCUTANEOUS at 20:27

## 2018-01-01 RX ADMIN — Medication 10 ML: at 21:16

## 2018-01-01 RX ADMIN — SODIUM CHLORIDE 40 MG: 9 INJECTION INTRAMUSCULAR; INTRAVENOUS; SUBCUTANEOUS at 10:44

## 2018-01-01 RX ADMIN — IOPAMIDOL 75 ML: 612 INJECTION, SOLUTION INTRAVENOUS at 17:58

## 2018-01-01 RX ADMIN — FLUTICASONE PROPIONATE 2 SPRAY: 50 SPRAY, METERED NASAL at 09:59

## 2018-01-01 RX ADMIN — METOPROLOL TARTRATE 12.5 MG: 25 TABLET ORAL at 21:54

## 2018-01-01 RX ADMIN — POLYETHYLENE GLYCOL 3350 17 G: 17 POWDER, FOR SOLUTION ORAL at 20:21

## 2018-09-30 PROBLEM — I10 HTN (HYPERTENSION): Status: ACTIVE | Noted: 2018-01-01

## 2018-09-30 PROBLEM — R55 SYNCOPE: Status: ACTIVE | Noted: 2018-01-01

## 2018-09-30 PROBLEM — Z99.81 CHRONIC RESPIRATORY FAILURE WITH HYPOXIA, ON HOME O2 THERAPY (HCC): Status: ACTIVE | Noted: 2018-01-01

## 2018-09-30 PROBLEM — D64.9 SEVERE ANEMIA: Status: ACTIVE | Noted: 2018-01-01

## 2018-09-30 PROBLEM — J96.11 CHRONIC RESPIRATORY FAILURE WITH HYPOXIA, ON HOME O2 THERAPY (HCC): Status: ACTIVE | Noted: 2018-01-01

## 2018-09-30 PROBLEM — I63.9 STROKE (HCC): Status: ACTIVE | Noted: 2018-01-01

## 2018-09-30 NOTE — ROUTINE PROCESS
Attempted to perform echo. Patient is in MRI. Will attempt again as soon as patient is back in room.

## 2018-09-30 NOTE — PROGRESS NOTES
[facility] REFERRAL CONTACT NOTE [id number] [facility] Re: Mark Chairez IP Consult for IP Rehab Screen received. Will review case and advise as appropriate. Thank you for the consult.  
 
Antonella Rodriguez

## 2018-09-30 NOTE — ED NOTES
TRANSFER - OUT REPORT: 
 
 Kleber Parker RN advised to read SBAR on Annelise Bethea  being transferred to Telemetry for routine progression of care   Bedside report to be given when this RN transports the patient to the floor. Report consisted of patients Situation, Background, Assessment and  
Recommendations(SBAR). Information from the following report(s) SBAR, ED Summary, [facility] and Recent Results was reviewed with the receiving nurse. Lines:  
Peripheral IV 09/30/18 Left Antecubital (Active) Site Assessment Clean, dry, & intact 9/30/2018 10:20 AM  
Phlebitis Assessment 0 9/30/2018 10:20 AM  
Infiltration Assessment 0 9/30/2018 10:20 AM  
Dressing Status Clean, dry, & intact 9/30/2018 10:20 AM  
Hub Color/Line Status Pink 9/30/2018 10:20 AM  
Alcohol Cap Used Yes 9/30/2018 10:20 AM  
  
 
Opportunity for questions and clarification was provided. Patient transported with: 
 Monitor O2 @ 2 liters Registered Nurse

## 2018-09-30 NOTE — ED PROVIDER NOTES
EMERGENCY DEPARTMENT HISTORY AND PHYSICAL EXAM 
 
Date: 9/30/2018 Patient Name: Chanel Stockton History of Presenting Illness Chief Complaint Patient presents with  Extremity Weakness History Provided By: Patient Chief Complaint: Left-sided weakness Duration: 13.5 hours Hours Timing:  Acute Location: left body Quality: weakness Associated Symptoms: denies any other associated signs or symptoms Additional History (Context):  
9:33 AM 
Chanel Stockton is a 80 y.o. female with PMHx of diabetes, HTN, atrial fibrillation, arthritis, acid reflux, high cholesterol, and sarcoidosis of lung who presents to the emergency department C/O left-sided weakness onset last night at 8 PM. No associated sxs. Last known normal was 8 PM last night. Pt's daughter reports the pt was leaning to the left when attempting to go downstairs for breakfast. Daughter reports pt had a stroke 3-4 years ago. Pt had physical therapy that resolved any deficits at that time. Pt denies abdominal pain, HA, left arm pain, and any other sxs or complaints. PCP: Roque Hudson MD 
 
Current Outpatient Prescriptions Medication Sig Dispense Refill  Brompheniramine-Pseudoeph-DM (DIMETAPP) 2-30-10 mg/5 mL syrup   0  
 ranitidine (ZANTAC) 150 mg tablet Take 150 mg by mouth two (2) times a day.  furosemide (LASIX) 20 mg tablet Take  by mouth daily.  mometasone (NASONEX) 50 mcg/actuation nasal spray 2 Sprays daily.  ipratropium (ATROVENT) 0.02 % nebulizer solution 2.5 mL by Nebulization route four (4) times daily. 120 vials 300 mL 3  
 albuterol (PROVENTIL VENTOLIN) 2.5 mg /3 mL (0.083 %) nebulizer solution 3 mL by Nebulization route four (4) times daily. 120 Each 3  
 albuterol (PROVENTIL HFA, VENTOLIN HFA, PROAIR HFA) 90 mcg/actuation inhaler Take 1-2 Puffs by inhalation every six (6) hours as needed for Wheezing. 1 Inhaler 3  
 celecoxib (CELEBREX) 200 mg capsule  glipiZIDE (GLUCOTROL) 5 mg tablet Take 5 mg by mouth two (2) times a day.  metFORMIN (GLUCOPHAGE) 500 mg tablet Take 500 mg by mouth daily (with breakfast).  pantoprazole (PROTONIX) 40 mg tablet Take 40 mg by mouth daily. Past History Past Medical History: 
Past Medical History:  
Diagnosis Date  Acid reflux  Arthritis  Atrial fibrillation ([address]  Diabetes ([facility] [facility]  High cholesterol  Hypertension  Sarcoidosis of lung ([facility] [address] Past Surgical History: 
Past Surgical History:  
Procedure Laterality Date  HX CHOLECYSTECTOMY Family History: 
History reviewed. No pertinent family history. Social History: 
Social History Substance Use Topics  Smoking status: Former Smoker Packs/day: 2.50 Years: 30.00 Types: Cigarettes  Smokeless tobacco: Never Used  Alcohol use No  
 
 
Allergies: 
No Known Allergies Review of Systems Review of Systems Gastrointestinal: Negative for abdominal pain. Musculoskeletal: Negative for myalgias (left arm). Neurological: Positive for weakness (left-sided). Negative for headaches. All other systems reviewed and are negative. Physical Exam  
 
Vitals:  
 09/30/18 0935 Pulse: (!) 115 Resp: 24 Temp: 97.8 °F (36.6 °C) SpO2: 99% Physical Exam  
Nursing note and vitals reviewed. Constitutional: Alert. Well appearing, no acute distress Head: Normocephalic, Atraumatic Eyes: Pupils are equal, round, and reactive to light, EOMI 
ENT: Moist mucous membranes, oropharynx clear. Neck: Supple, non-tender Cardiovascular: Regular rate and rhythm, no murmurs, rubs, or gallops Chest: Normal work of breathing and chest excursion bilaterally. No reproducible chest tenderness. Lungs: Clear to ausculation bilaterally. Abdomen: Soft, non tender, non distended Back: No evidence of trauma or deformity. No CVA Tenderness. Extremities: No evidence of trauma or deformity, no LE edema Skin: Warm and dry Neuro: Alert and appropriate, CN II-XII intact, facial movement symmetric, normal speech (no slurred speech), weakness in LUE and LLE, leaning to the left Psychiatric: Normal mood and affect Diagnostic Study Results Labs - Recent Results (from the past 12 hour(s)) GLUCOSE, POC Collection Time: 09/30/18  9:34 AM  
Result Value Ref Range Glucose (POC) 244 (H) 70 - 110 mg/dL EKG, 12 LEAD, INITIAL Collection Time: 09/30/18 10:19 AM  
Result Value Ref Range Ventricular Rate 105 BPM  
 Atrial Rate 105 BPM  
 P-R Interval 160 ms QRS Duration 76 ms  
 Q-T Interval 330 ms QTC Calculation (Bezet) 436 ms Calculated P Axis 71 degrees Calculated R Axis 48 degrees Calculated T Axis 44 degrees Diagnosis Sinus tachycardia with premature supraventricular complexes Otherwise normal ECG When compared with ECG of 11-JUN-2015 12:46, 
premature supraventricular complexes are now present Radiologic Studies -  
CT HEAD WO CONT Final Result IMPRESSION:  
  
1. Moderate periventricular and subcortical white matter hypoattenuation with  
questionable subtle diminished gray-white differentiation particularly in the  
right MCA distribution suggesting a combination of chronic microvascular changes  
and age indeterminate ischemia however, suspicious for acute or subacute  
infarct. 2.  Motion and beam hardening artifact degrades image quality. A few subtle  
streaky densities along the left inferior parieto-occipital convexity is most  
likely artifact mimicking a subtle focus of subarachnoid hemorrhage which is  
considered much less likely particularly in the absence of trauma or headache. Otherwise, no evidence of acute intracranial hemorrhage. I communicated the above findings to Dr. Felicia Robins via telephone at 10:05 AM on  
9/30/2018. As read by the radiologist. 
  
XR CHEST PORT    (Results Pending) 10:30 AM 
RADIOLOGY FINDINGS 
 Chest X-ray shows no acute process. Pending review by Radiologist 
Recorded by ROB Law, as dictated by Eagle Celaya MD. CT Results  (Last 48 hours) 09/30/18 0941  CT HEAD WO CONT Final result Impression:  IMPRESSION:  
   
1. Moderate periventricular and subcortical white matter hypoattenuation with  
questionable subtle diminished gray-white differentiation particularly in the  
right MCA distribution suggesting a combination of chronic microvascular changes  
and age indeterminate ischemia however, suspicious for acute or subacute  
infarct. 2.  Motion and beam hardening artifact degrades image quality. A few subtle  
streaky densities along the left inferior parieto-occipital convexity is most  
likely artifact mimicking a subtle focus of subarachnoid hemorrhage which is  
considered much less likely particularly in the absence of trauma or headache. Otherwise, no evidence of acute intracranial hemorrhage. I communicated the above findings to Dr. Perico Sanchez via telephone at 10:05 AM on  
9/30/2018. Narrative:  EXAM: CT HEAD WO CONT  
   
CLINICAL INDICATION/HISTORY: Stroke; Code S.  
-Additional: None COMPARISON: None. TECHNIQUE: Axial CT imaging of the head was performed without intravenous  
contrast. Sagittal and coronal reconstructions are provided. One or more dose  
reduction techniques were used on this CT: automated exposure control,  
adjustment of the mAs and/or kVp according to patient size, and iterative  
reconstruction techniques. The specific techniques used on this CT exam have  
been documented in the patient's electronic medical record.  
   
   
_______________ FINDINGS:  
   
   
Image quality is degraded by a moderate degree of motion as well as fairly  
extensive beam hardening artifacts resulting in scattered streaky densities  
particularly at the region of the inferior parietal occipital and skull base. BRAIN AND POSTERIOR FOSSA: There are moderate scattered and confluent foci of  
decreased attenuation in the periventricular white matter which are nonspecific  
but most likely sequelae of chronic microvascular disease. In addition, there is  
questionable subtle loss of the gray-white differentiation in the right frontal  
lobe corresponding to the MCA distribution including on axial series images 18  
and 19. No significant sulcal effacement. No intraparenchymal hemorrhages are  
identified. EXTRA-AXIAL SPACES AND MENINGES: No definite hemorrhages are identified. However, there are one or 2 streaky densities along the periphery of the left  
inferior parieto-occipital convexity which are favored to represent beam  
hardening artifact mimicking the appearance of subtle subarachnoid hemorrhage  
which is felt to be much less likely particularly in the absence of trauma or  
sudden onset headache. CALVARIUM: Intact. SINUSES: Clear. OTHER: None.  
   
_______________ CXR Results  (Last 48 hours) None Medications given in the ED- Medications  
aspirin (ASPIRIN) tablet 325 mg (325 mg Oral Given 9/30/18 1028) Medical Decision Making I am the first provider for this patient. I reviewed the vital signs, available nursing notes, past medical history, past surgical history, family history and social history. Vital Signs-Reviewed the patient's vital signs. Pulse Oximetry Analysis - 99% on 2 liters O2 via NC Cardiac Monitor: 
Rate: 111 bpm 
Rhythm: Sinus tachycardia EKG interpretation: (Preliminary) Rhythm: Sinus tachycardia with premature supraventricular complexes. Rate: 105 bpm; QTc: 436 ms 
EKG read by Soheila Sheehan MD at 10:30 AM  
 
Records Reviewed: Nursing Notes and Old Medical Records Provider Notes (Medical Decision Making): 80 y.o female last known normal 8 PM last evening presenting with left-sided upper and lower extremity weakness. Sugar was 244. At this time, concern for stroke. Code S was called. Pt taken immediately to CT and will be evaluated by the telerneurologist.  
 
Procedures: 
Procedures ED Course:  
9:33 AM Initial assessment performed. The patients presenting problems have been discussed, and they are in agreement with the care plan formulated and outlined with them. I have encouraged them to ask questions as they arise throughout their visit. 9:36 AM Code S called. Code S protocol initiated. 9:50 AM Discussed patient's history, exam, and available diagnostics results with Giuliana Arzola MD, [facility], who will evaluate the pt. 
 
10:05 AM Discussed patient's history, exam, and available diagnostics results with Giuliana Arzola MD, Telerneurologist, who states to not administer tPA. Uncertain if stroke or seizure. Admit pt. Give Aspirin. Pt's symptoms are improving. 10:17 AM Able to move and lift left leg and  in left hand. CT showed what appears to be acute vs subacute infarct MCA right. 10:25 AM Discussed patient's history, exam, and available diagnostics results with Robin Zhang MD, Hospitalist, who agrees to admit pt to telemetry. Diagnosis and Disposition Critical Care Time: NONE Core Measures: 
For Hospitalized Patients: 
 
1. Hospitalization Decision Time: The decision to hospitalize the patient was made by Renan Tejada MD at 10:05 AM on 9/30/2018 2. Aspirin: Aspirin was given 10:25 AM  Patient is being admitted to the hospital by Robin Zhang MD. The results of their tests and reasons for their admission have been discussed with them and/or available family. They convey agreement and understanding for the need to be admitted and for their admission diagnosis. CONDITIONS ON ADMISSION: 
Sepsis is not present at the time of admission. Deep Vein Thrombosis is not present at the time of admission.  Thrombosis is not present at the time of admission. Urinary Tract Infection is not present at the time of admission. Pneumonia is not present at the time of admission. MRSA is not present at the time of admission. Wound infection is not present at the time of admission. Pressure Ulcer is not present at the time of admission. CLINICAL IMPRESSION: 
 
1. Cerebrovascular accident (CVA), unspecified mechanism ([address]   
 
 
_______________________________ Attestations: This note is prepared by Tyrese Condon, acting as Scribe for Sharee Pelaez MD. Sharee Pelaez MD:  The scribe's documentation has been prepared under my direction and personally reviewed by me in its entirety. I confirm that the note above accurately reflects all work, treatment, procedures, and medical decision making performed by me. 
_______________________________

## 2018-09-30 NOTE — ROUTINE PROCESS
Bedside shift change report given to [address] (oncoming nurse) by Yulissa Curry RN (offgoing nurse). Report included the following information SBAR, Kardex, Intake/Output and MAR.

## 2018-09-30 NOTE — CONSULTS
NEUROLOGY ER CONSULTATION NOTE Patient: Ciera Mendez MRN: [id number]  CSN: [id number] YOB: 1932  Age: 80 y.o. Sex: female DOA: 9/30/2018 LOS:  LOS: 0 days Requesting Physician: Dr. Thiago Gardiner 
Reason for Consultation: Stroke? HISTORY OF PRESENT ILLNESS:  
Ciera Mendez is a 80 y.o. female with history of paroxysmal afib, HTN, diabetes and HLD, who was brought to ER by her daughter due to left leg/arm weakness. She does have a history of stroke in 2010, located in left postcentral gyrus. The current symptoms started at 8pm yesterday and therefore she was well beyond the window for any intervention. The patient herself denies lateralized weakness however. She does not remember being on aspirin or anticoagulation. Her head CT showed a moderate periventricular hypoattenuation in the right MCA distribution, suggestive of acute/subacute infarct. PAST MEDICAL HISTORY: 
Past Medical History:  
Diagnosis Date  Acid reflux  Arthritis  Atrial fibrillation ([address] [address]  Diabetes ([address]  High cholesterol  Hypertension  Sarcoidosis of lung ([facility] [address] PAST SURGICAL HISTORY: 
Past Surgical History:  
Procedure Laterality Date  HX CHOLECYSTECTOMY FAMILY HISTORY: 
History reviewed. No pertinent family history. SOCIAL HISTORY: 
Social History Social History  Marital status: UNKNOWN Spouse name: N/A  
 Number of children: N/A  
 Years of education: N/A Social History Main Topics  Smoking status: Former Smoker Packs/day: 2.50 Years: 30.00 Types: Cigarettes  Smokeless tobacco: Never Used  Alcohol use No  
 Drug use: No  
 Sexual activity: Not Asked Other Topics Concern  None Social History Narrative MEDICATIONS: 
Current Facility-Administered Medications Medication Dose Route Frequency  0.9% NaCl bacteriostatic (NORMAL SALINE) 0.9 % injection 15 mL  15 mL IntraVENous ONCE  
  albuterol (PROVENTIL HFA, VENTOLIN HFA, PROAIR HFA) inhaler 1-2 Puff  1-2 Puff Inhalation Q6H PRN  
 furosemide (LASIX) tablet 20 mg  20 mg Oral DAILY  raNITIdine (ZANTAC) tablet 150 mg  150 mg Oral BID  
 0.9% NaCl bacteriostatic (NORMAL SALINE) 0.9 % injection  sodium chloride (NS) flush 5-10 mL  5-10 mL IntraVENous Q8H  
 sodium chloride (NS) flush 5-10 mL  5-10 mL IntraVENous PRN  
 0.9% sodium chloride infusion  75 mL/hr IntraVENous CONTINUOUS  
 [START ON 10/1/2018] aspirin (ASPIRIN) tablet 325 mg  325 mg Oral DAILY  heparin (porcine) injection 5,000 Units  5,000 Units SubCUTAneous Q8H  
 insulin lispro (HUMALOG) injection   SubCUTAneous AC&HS  
 glucose chewable tablet 16 g  4 Tab Oral PRN  
 glucagon (GLUCAGEN) injection 1 mg  1 mg IntraMUSCular PRN  
 dextrose (D50W) injection syrg 12.5-25 g  25-50 mL IntraVENous PRN Current Outpatient Prescriptions Medication Sig  Brompheniramine-Pseudoeph-DM (DIMETAPP) 2-30-10 mg/5 mL syrup  ranitidine (ZANTAC) 150 mg tablet Take 150 mg by mouth two (2) times a day.  furosemide (LASIX) 20 mg tablet Take  by mouth daily.  mometasone (NASONEX) 50 mcg/actuation nasal spray 2 Sprays daily.  ipratropium (ATROVENT) 0.02 % nebulizer solution 2.5 mL by Nebulization route four (4) times daily. 120 vials  albuterol (PROVENTIL VENTOLIN) 2.5 mg /3 mL (0.083 %) nebulizer solution 3 mL by Nebulization route four (4) times daily.  albuterol (PROVENTIL HFA, VENTOLIN HFA, PROAIR HFA) 90 mcg/actuation inhaler Take 1-2 Puffs by inhalation every six (6) hours as needed for Wheezing.  celecoxib (CELEBREX) 200 mg capsule  glipiZIDE (GLUCOTROL) 5 mg tablet Take 5 mg by mouth two (2) times a day.  metFORMIN (GLUCOPHAGE) 500 mg tablet Take 500 mg by mouth daily (with breakfast).  pantoprazole (PROTONIX) 40 mg tablet Take 40 mg by mouth daily. Prior to Admission medications Medication Sig Start Date End Date Taking? Authorizing Provider Brompheniramine-Pseudoeph-DM (DIMETAPP) 2-30-10 mg/5 mL syrup  6/15/16  Yes Historical Provider  
ranitidine (ZANTAC) 150 mg tablet Take 150 mg by mouth two (2) times a day. Yes Historical Provider  
furosemide (LASIX) 20 mg tablet Take  by mouth daily. Yes Historical Provider  
mometasone (NASONEX) 50 mcg/actuation nasal spray 2 Sprays daily. Yes Historical Provider  
ipratropium (ATROVENT) 0.02 % nebulizer solution 2.5 mL by Nebulization route four (4) times daily. 120 vials 8/10/16  Yes Lester Swanson MD  
albuterol (PROVENTIL VENTOLIN) 2.5 mg /3 mL (0.083 %) nebulizer solution 3 mL by Nebulization route four (4) times daily. 8/10/16  Yes Lester Swanson MD  
albuterol (PROVENTIL HFA, VENTOLIN HFA, PROAIR HFA) 90 mcg/actuation inhaler Take 1-2 Puffs by inhalation every six (6) hours as needed for Wheezing. 8/10/16  Yes Lester Swanson MD  
celecoxib (CELEBREX) 200 mg capsule  2/3/15  Yes Historical Provider  
glipiZIDE (GLUCOTROL) 5 mg tablet Take 5 mg by mouth two (2) times a day. Yes Historical Provider  
metFORMIN (GLUCOPHAGE) 500 mg tablet Take 500 mg by mouth daily (with breakfast). Yes Historical Provider  
pantoprazole (PROTONIX) 40 mg tablet Take 40 mg by mouth daily. Yes Misael Rivers MD  
 
 
ALLERGIES: 
No Known Allergies Review of Systems GENERAL: No fevers or chills. HEENT: No change in vision, earache, tinnitus, sore throat or sinus congestion. NECK: No pain or stiffness. CARDIOVASCULAR: No chest pain or pressure. No palpitations. PULMONARY: No shortness of breath, cough or wheeze. GASTROINTESTINAL: No abdominal pain, nausea, vomiting or diarrhea. GENITOURINARY: No urinary frequency, urgency, hesitancy or dysuria. MUSCULOSKELETAL: No joint or muscle pain, no back pain, no recent trauma. DERMATOLOGIC: No rash, no itching, no lesions. ENDOCRINE: No polyuria, polydipsia, no heat or cold intolerance. HEMATOLOGICAL: No anemia or easy bruising or bleeding. NEUROLOGIC: No headache, seizures, numbness, tingling. Positive for weakness. PHYSICAL EXAMINATION:  
 
Visit Vitals  Pulse (!) 103  Temp 97.8 °F (36.6 °C)  Resp 20  
 Ht 5' 3\" (1.6 m)  Wt 97.1 kg (214 lb)  SpO2 100%  BMI 37.91 kg/m2 O2 Flow Rate (L/min): 2 l/min O2 Device: Nasal cannula GENERAL: Pleasant, in no apparent distress. HEENT: Moist mucous membranes, sclerae anicteric, scalp is atraumatic. Edentulous. CVS: Regular rate and rhythm, no murmurs or gallops. No carotid bruits. PULMONARY: Clear to auscultation bilaterally. No rales or rhonchi. No wheezing. EXTREMITIES: Normal range of motion at all sites. No deformities. ABDOMEN: Soft, nontender. SKIN: No rashes or ecchymoses. Warm and dry. NEUROLOGIC: Alert and oriented to self, hospital, THE [facility]. Speech is fluent without any aphasia but dysarthria (edentulous state). Cranial nerves: Face is symmetric with symmetric smile. Facial sensation is intact. Extraocular movements are intact with no nystagmus. Visual fields are full to confrontation. PERRL. Tongue is midline. Palate elevates symmetrically. Hearing intact to speech. Sternocleidomastoid and trapezius strengths are full bilaterally. Motor: Normal tone and normal bulk on all four extremities. Strength is decrease on left leg>left arm sligthly with subtle drift. No remarkable drift noted on the right. Sensory: Intact to pinprick and touch on all four. Coordination: Intact coordination with finger-nose-finger bilaterally. Normal fine movements. No bradykinesia detected. Deep tendon reflexes: Decreased. Labs: Results:  
   
Chemistry Recent Labs  
   09/30/18 
 1000 GLU  227* NA  140  
K  4.9 CL  107 CO2  23 BUN  23* CREA  1.63* CA  8.9 AGAP  10 BUCR  14 AP  47  
TP  7.6 ALB  3.5 GLOB  4.1* AGRAT  0.9 CBC w/Diff Recent Labs  
   09/30/18 
 1000 WBC  8.7  
RBC  3.59* HGB  6.1*  
HCT  22.6*  
PLT  346 GRANS  PENDING  
LYMPH  PENDING  
EOS  PENDING Cardiac Enzymes Recent Labs  
   09/30/18 
 1000 CPK  37 CKND1  CALCULATION NOT PERFORMED WHEN RESULT IS BELOW LINEAR LIMIT Coagulation No results for input(s): PTP, INR, APTT in the last 72 hours. No lab exists for component: INREXT Lipid Panel Lab Results Component Value Date/Time Cholesterol, total 148 02/23/2015 01:49 AM  
 HDL Cholesterol 68 (H) 02/23/2015 01:49 AM  
 LDL, calculated 69.2 02/23/2015 01:49 AM  
 VLDL, calculated 10.8 02/23/2015 01:49 AM  
 Triglyceride 54 02/23/2015 01:49 AM  
 CHOL/HDL Ratio 2.2 02/23/2015 01:49 AM  
  
BNP No results for input(s): BNPP in the last 72 hours. Liver Enzymes Recent Labs  
   09/30/18 
 1000 TP  7.6 ALB  3.5 AP  47 SGOT  9* Thyroid Studies No results found for: T4, T3U, TSH, TSHEXT Radiology: 
Ct Head Wo Cont Result Date: 9/30/2018 EXAM: CT HEAD WO CONT CLINICAL INDICATION/HISTORY: Stroke; Code S. -Additional: None COMPARISON: None. TECHNIQUE: Axial CT imaging of the head was performed without intravenous contrast. Sagittal and coronal reconstructions are provided. One or more dose reduction techniques were used on this CT: automated exposure control, adjustment of the mAs and/or kVp according to patient size, and iterative reconstruction techniques. The specific techniques used on this CT exam have been documented in the patient's electronic medical record. _______________ FINDINGS: Image quality is degraded by a moderate degree of motion as well as fairly extensive beam hardening artifacts resulting in scattered streaky densities particularly at the region of the inferior parietal occipital and skull base.  BRAIN AND POSTERIOR FOSSA: There are moderate scattered and confluent foci of decreased attenuation in the periventricular white matter which are nonspecific but most likely sequelae of chronic microvascular disease. In addition, there is questionable subtle loss of the gray-white differentiation in the right frontal lobe corresponding to the MCA distribution including on axial series images 18 and 19. No significant sulcal effacement. No intraparenchymal hemorrhages are identified. EXTRA-AXIAL SPACES AND MENINGES: No definite hemorrhages are identified. However, there are one or 2 streaky densities along the periphery of the left inferior parieto-occipital convexity which are favored to represent beam hardening artifact mimicking the appearance of subtle subarachnoid hemorrhage which is felt to be much less likely particularly in the absence of trauma or sudden onset headache. CALVARIUM: Intact. SINUSES: Clear. OTHER: None. _______________ IMPRESSION: 1. Moderate periventricular and subcortical white matter hypoattenuation with questionable subtle diminished gray-white differentiation particularly in the right MCA distribution suggesting a combination of chronic microvascular changes and age indeterminate ischemia however, suspicious for acute or subacute infarct. 2.  Motion and beam hardening artifact degrades image quality. A few subtle streaky densities along the left inferior parieto-occipital convexity is most likely artifact mimicking a subtle focus of subarachnoid hemorrhage which is considered much less likely particularly in the absence of trauma or headache. Otherwise, no evidence of acute intracranial hemorrhage. I communicated the above findings to Dr. Evie Bruce via telephone at 10:05 AM on 9/30/2018. ASSESSMENT/IMPRESSION: 
Possible right MCA stroke or recrudescence of an old stroke. The patient needs to be on anticoagulation due to afib, but for some reason she does not appear to be. Maybe she has high risk for falls. For now, we will continue with aspirin and decide after full stroke work-up.  
 
RECOMMENDATIONS: 
 Please use stroke admission order sets. 1. Aspirin 325mg po now. Atorvastatin 80mg daily. 2. Admit to telemetry with tele monitoring 3. Neuro checks per stroke protocol 4. Permissive hypertension (do not treat if BP is not >200/110, prefer prn labetolol 5mg) 5. IV normal saline continuous infusion 100-125 ml/h 6. Euglycemia with sliding scale insulin 7. Euthermia with acetaminophen 650mg Q6h prn for fever 8. Avoid urinary catheters please. 9. MRI without contrast 
10. CTA of head and neck 11. Echocardiogram with bubble study 12. Lipid panel 13. Hemoglobin A1c 
14. SCDs and DVT prophylaxis 15. PT/OT and SLP consults I will follow the patient. Please do not hesitate to return with any questions. 
 
------------------------------------ 
Arden Wyatt MD 
9/30/2018 11:16 AM

## 2018-09-30 NOTE — H&P
History & Physical 
 
Patient: Carolyne Garrido MRN: [id number]  CSN: [id number] YOB: 1932  Age: 80 y.o. Sex: female DOA: 9/30/2018 Primary Care Provider:  Nnamdi De La Fuente MD 
 
 
Assessment/Plan Patient Active Problem List  
Diagnosis Code  Dyspnea R06.00  Troponin level elevated R74.8  Wheezing R06.2  Sarcoidosis D86.9  Diabetes ([facility] E11.9  Hypoxia R09.02  Stroke [facility] I63.9  Syncope R55  Chronic respiratory failure with hypoxia, on home O2 therapy (HCC) J96.11, Z99.81  
 HTN (hypertension) I10  Type II diabetes mellitus, uncontrolled ([facility] E11.65 Admit to [facility] Syncope: CVA vs severe anemia vs seizure. CVA: CVA protocol. Consult to neurology. Discussed the case with Dr. Nikolas Abreu. MRI and CTA of head, echocardiogram with bubble study, Get fasting lipid panel, on ASA. Regular neurochecks q4 hrly Start the patient on iv fluids Severe anemia: Work up ordered. Unsure the source, need PRBC transfusion for Hb<7.5 HTN: With her CVA, keep SBP >160. Hold on BP meds now Chronic respiratory failure: On home O2 @ 2L/min Uncontrolled DM: Check HbA1C, SSI Sarcoidosis: Stable. Ventolin prn DVT/GI prophylaxis: IV Protonix Full code AC: I have had a discussion with patient regarding code status. Particularly, described potential options in event of cardiac or respiratory arrest. I have explained what being full code entails, including cardiorespiratory resuscitation attempts with chest compressions, potential cariodioversion/ \" shocks\" as well as intubation. I have also explained Do not resuscitate which would mean we allow a natural death with out aggressive interventions. Pt's daughter who is her POA states that she is full code Estimated length of stay: 4 days. CC: An episode of syncope with left side weakness HPI:  
 
Carolyne Garrido is a 80 y.o. female with history of CVA in 2010 not on daily anticoagulation, paroxysmal afib, HTN, uncontrolled diabetes and HLD, chronic respiratory on home O2 24 hrs due to sarcoidosis who was brought to ER by EMS for an episode of syncope with left side weakness. She was unable to give much hx 2nd to weakness. Per her daughter who lives with her reports that the pt was found with unconscious with leaning to the left side around 7: 00 am this morning. Noticed the patient has weakness to left side extremities. Last time the pt was seen in her baseline was 8 pm. EMS was called, and she was brought to the ER. At ER, code S was called. Her head CT showed a moderate periventricular hypoattenuation in the right MCA distribution, suggestive of acute/subacute infarct. CVA protocol started Past Medical History:  
Diagnosis Date  Acid reflux  Arthritis  Atrial fibrillation ([facility]  Diabetes ([facility]  High cholesterol  Hypertension  Sarcoidosis of lung ([facility] Past Surgical History:  
Procedure Laterality Date  HX CHOLECYSTECTOMY History reviewed. No pertinent family history. Social History Social History  Marital status: UNKNOWN Spouse name: N/A  
 Number of children: N/A  
 Years of education: N/A Social History Main Topics  Smoking status: Former Smoker Packs/day: 2.50 Years: 30.00 Types: Cigarettes  Smokeless tobacco: Never Used  Alcohol use No  
 Drug use: No  
 Sexual activity: Not Asked Other Topics Concern  None Social History Narrative Prior to Admission medications Medication Sig Start Date End Date Taking? Authorizing Provider  
simvastatin (ZOCOR) 20 mg tablet Take 20 mg by mouth nightly. Yes Historical Provider  
montelukast (SINGULAIR) 10 mg tablet Take 10 mg by mouth daily. Yes Historical Provider Brompheniramine-Pseudoeph-DM (DIMETAPP) 2-30-10 mg/5 mL syrup  6/15/16  Yes Historical Provider ranitidine (ZANTAC) 150 mg tablet Take 150 mg by mouth two (2) times a day. Yes Historical Provider  
furosemide (LASIX) 20 mg tablet Take  by mouth daily. Yes Historical Provider  
mometasone (NASONEX) 50 mcg/actuation nasal spray 2 Sprays daily. Yes Historical Provider  
ipratropium (ATROVENT) 0.02 % nebulizer solution 2.5 mL by Nebulization route four (4) times daily. 120 vials 8/10/16  Yes Eddie Kwan MD  
albuterol (PROVENTIL VENTOLIN) 2.5 mg /3 mL (0.083 %) nebulizer solution 3 mL by Nebulization route four (4) times daily. 8/10/16  Yes Eddie Kwan MD  
albuterol (PROVENTIL HFA, VENTOLIN HFA, PROAIR HFA) 90 mcg/actuation inhaler Take 1-2 Puffs by inhalation every six (6) hours as needed for Wheezing. 8/10/16  Yes Eddie Kwan MD  
celecoxib (CELEBREX) 200 mg capsule  2/3/15  Yes Historical Provider  
glipiZIDE (GLUCOTROL) 5 mg tablet Take 5 mg by mouth two (2) times a day. Yes Historical Provider  
metFORMIN (GLUCOPHAGE) 500 mg tablet Take 500 mg by mouth daily (with breakfast). Yes Historical Provider  
pantoprazole (PROTONIX) 40 mg tablet Take 40 mg by mouth daily. Yes Misael Rivers MD  
 
 
No Known Allergies Review of Systems Limited 2nd to her weakness Physical Exam:  
 
Physical Exam: 
Visit Vitals  /59  Pulse (!) 101  Temp 98.5 °F (36.9 °C)  Resp 18  Ht 5' 3\" (1.6 m)  Wt 97.1 kg (214 lb)  SpO2 100%  BMI 37.91 kg/m2 O2 Flow Rate (L/min): 2 l/min O2 Device: Nasal cannula Temp (24hrs), Av.5 °F (36.9 °C), Min:97.8 °F (36.6 °C), Max:99.3 °F (37.4 °C) General:  Awake, cooperative, no distress. Head:  Normocephalic, without obvious abnormality, atraumatic. Eyes:  Conjunctivae/corneas clear, sclera anicteric, PERRL, EOMs intact. Nose: Nares normal. No drainage or sinus tenderness. Throat: Lips, mucosa, and tongue normal.   
Neck: Supple, symmetrical, trachea midline, no adenopathy. Lungs:   Clear to auscultation bilaterally. Heart:  Regular rate and rhythm, S1, S2 normal, no murmur, click, rub or gallop. Abdomen: Soft, non-tender. Bowel sounds normal. No masses,  No organomegaly. Extremities: Extremities normal, atraumatic, no cyanosis or edema. Capillary refill normal.  
Pulses: 2+ and symmetric all extremities. Skin: Skin color pink/pale/mottled/flushed, turgor normal. No rashes or lesions Neurologic: CNII-XII intact except dysarthria. Left strength slightly weaker than right side. Labs Reviewed: 
 
Recent Results (from the past 24 hour(s)) GLUCOSE, POC Collection Time: 09/30/18  9:34 AM  
Result Value Ref Range Glucose (POC) 244 (H) 70 - 110 mg/dL CBC WITH AUTOMATED DIFF Collection Time: 09/30/18 10:00 AM  
Result Value Ref Range WBC 8.7 4.6 - 13.2 K/uL  
 RBC 3.59 (L) 4.20 - 5.30 M/uL HGB 6.1 (L) 12.0 - 16.0 g/dL HCT 22.6 (L) 35.0 - 45.0 % MCV 63.0 (L) 74.0 - 97.0 FL  
 MCH 17.0 (L) 24.0 - 34.0 PG  
 MCHC 27.0 (L) 31.0 - 37.0 g/dL RDW 21.4 (H) 11.6 - 14.5 % PLATELET 297 532 - 565 K/uL MPV 8.6 (L) 9.2 - 11.8 FL  
 NEUTROPHILS 81 (H) 40 - 73 % LYMPHOCYTES 14 (L) 21 - 52 % MONOCYTES 4 3 - 10 % EOSINOPHILS 1 0 - 5 % BASOPHILS 0 0 - 2 %  
 ABS. NEUTROPHILS 7.1 1.8 - 8.0 K/UL  
 ABS. LYMPHOCYTES 1.2 0.9 - 3.6 K/UL  
 ABS. MONOCYTES 0.3 0.05 - 1.2 K/UL  
 ABS. EOSINOPHILS 0.1 0.0 - 0.4 K/UL  
 ABS. BASOPHILS 0.0 0.0 - 0.1 K/UL  
 RBC COMMENTS HYPOCHROMIA 2+ 
    
 RBC COMMENTS OVALOCYTES 2+ 
    
 RBC COMMENTS SCHISTOCYTES 
OCCASSIONAL 
    
 DF AUTOMATED METABOLIC PANEL, COMPREHENSIVE Collection Time: 09/30/18 10:00 AM  
Result Value Ref Range Sodium 140 136 - 145 mmol/L Potassium 4.9 3.5 - 5.5 mmol/L Chloride 107 100 - 108 mmol/L  
 CO2 23 21 - 32 mmol/L Anion gap 10 3.0 - 18 mmol/L Glucose 227 (H) 74 - 99 mg/dL BUN 23 (H) 7.0 - 18 MG/DL  Creatinine 1.63 (H) 0.6 - 1.3 MG/DL  
 BUN/Creatinine ratio 14 12 - 20 GFR est AA 36 (L) >60 ml/min/1.73m2 GFR est non-AA 30 (L) >60 ml/min/1.73m2 Calcium 8.9 8.5 - 10.1 MG/DL Bilirubin, total 0.3 0.2 - 1.0 MG/DL  
 ALT (SGPT) 14 13 - 56 U/L  
 AST (SGOT) 9 (L) 15 - 37 U/L Alk. phosphatase 47 45 - 117 U/L Protein, total 7.6 6.4 - 8.2 g/dL Albumin 3.5 3.4 - 5.0 g/dL Globulin 4.1 (H) 2.0 - 4.0 g/dL A-G Ratio 0.9 0.8 - 1.7 CARDIAC PANEL,(CK, CKMB & TROPONIN) Collection Time: 09/30/18 10:00 AM  
Result Value Ref Range CK 37 26 - 192 U/L  
 CK - MB <1.0 <3.6 ng/ml CK-MB Index  0.0 - 4.0 % CALCULATION NOT PERFORMED WHEN RESULT IS BELOW LINEAR LIMIT Troponin-I, Qt. 0.03 0.00 - 0.06 NG/ML  
LIPID PANEL Collection Time: 09/30/18 10:00 AM  
Result Value Ref Range LIPID PROFILE Cholesterol, total 175 <200 MG/DL Triglyceride 120 <150 MG/DL  
 HDL Cholesterol 81 (H) 40 - 60 MG/DL  
 LDL, calculated 70 0 - 100 MG/DL VLDL, calculated 24 MG/DL  
 CHOL/HDL Ratio 2.2 0 - 5.0 HEMOGLOBIN A1C WITH EAG Collection Time: 09/30/18 10:00 AM  
Result Value Ref Range Hemoglobin A1c 5.5 4.5 - 5.6 % Est. average glucose 111 mg/dL EKG, 12 LEAD, INITIAL Collection Time: 09/30/18 10:19 AM  
Result Value Ref Range Ventricular Rate 105 BPM  
 Atrial Rate 105 BPM  
 P-R Interval 160 ms QRS Duration 76 ms  
 Q-T Interval 330 ms QTC Calculation (Bezet) 436 ms Calculated P Axis 71 degrees Calculated R Axis 48 degrees Calculated T Axis 44 degrees Diagnosis Sinus tachycardia with premature supraventricular complexes Otherwise normal ECG When compared with ECG of 11-JUN-2015 12:46, 
premature supraventricular complexes are now present ECHO ADULT COMPLETE Collection Time: 09/30/18  2:40 PM  
Result Value Ref Range LA Volume 47.08 22 - 52 mL Right Atrial Area 4C 8.20 cm2 Ao Root D 3.20 cm  
 AO ASC D 3.08 cm  
 AO ARCH D 3.25 cm Aortic Valve Systolic Peak Velocity 639.23 cm/s AoV VTI 41.42 cm Aortic Valve Area by Continuity of Peak Velocity 2.2 cm2 Aortic Valve Area by Continuity of VTI 2.2 cm2 AoV PG 20.1 mmHg LVIDd 3.51 (A) 3.9 - 5.3 cm  
 LVPWd 1.17 (A) 0.6 - 0.9 cm LVIDs 2.51 cm IVSd 1.17 (A) 0.6 - 0.9 cm  
 LV ED Vol A2C 84.0 mL  
 LV ES Vol A4C 27.6 mL  
 LV ES Vol BP 24.2 19 - 49 mL  
 LVOT d 2.10 cm LVOT Peak Velocity 144.34 cm/s LVOT Peak Gradient 8.3 mmHg LVOT VTI 26.22 cm LV E' Septal Velocity 0.70 cm/s LV E' Lateral Velocity 0.70 cm/s  
 MVA (PHT) 2.6 cm2  
 MV A Moris 117.12 cm/s  
 MV E Moris 0.87 cm/s  
 MV E/A 0.01   
 RVIDd 4.07 cm Aortic Valve Systolic Mean Gradient 90.9 mmHg BP EF 69.4 55 - 100 % LV Ejection Fraction MOD 4C 62 % LV Ejection Fraction MOD 2C 75 % LA Vol 4C 44.02 22 - 52 mL  
 LA Vol 2C 45.24 22 - 52 mL  
 LV Mass .3 67 - 162 g  
 LV Mass AL Index 75.5 g/m2 E/E' lateral 1.24   
 E/E' septal 1.24 IVC proximal 2.00 cm  
 E/E' ratio (averaged) 1.24 LV ES Vol A2C 21.3 mL  
 LVES Vol Index BP 12.2 mL/m2 LV ED Vol A4C 73.4 mL  
 LVED Vol Index BP 39.7 mL/m2 Mitral Valve E Wave Deceleration Time 287.4 ms Mitral Valve Pressure Half-time 83.4 ms Left Atrium Major Axis 3.25 cm Tapse 2.00 1.5 - 2.0 cm Triscuspid Valve Regurgitation Peak Gradient 31.9 mmHg LV ED Vol BP 79.1 56 - 104 ml  
 TR Max Velocity 282.29 cm/s  
 LA Vol Index 23.66 ml/m2 LA Vol Index 22.73 ml/m2 LA Vol Index 22.12 ml/m2 LVED Vol Index A4C 36.9 mL/m2 LVED Vol Index A2C 42.2 mL/m2 LVES Vol Index A4C 13.9 mL/m2 LVES Vol Index A2C 10.7 mL/m2 STANISLAW/BSA Pk Moris 1.1 cm2/m2 STANISLAW/BSA VTI 1.1 cm2/m2 HGB & HCT Collection Time: 09/30/18  3:30 PM  
Result Value Ref Range HGB 5.5 (LL) 12.0 - 16.0 g/dL HCT 19.5 (L) 35.0 - 45.0 % TYPE + CROSSMATCH Collection Time: 09/30/18  3:30 PM  
Result Value Ref Range Crossmatch Expiration 10/03/2018 ABO/Rh(D) A POSITIVE Antibody screen NEG   
 CALLED TO:    
  2 UNITS READY NOTIFIED PEDRITO ACOSTA RN 3A AT 1640 ON 9/30/18 BY SRAVANTHI. Unit number [id number] Blood component type [facility] Unit division 00 Status of unit ISSUED Crossmatch result Compatible Unit number [id number] Blood component type [facility] Unit division 00 Status of unit ALLOCATED Crossmatch result Compatible Procedures/imaging: see electronic medical records for all procedures/Xrays and details which were not copied into this note but were reviewed prior to creation of Plan CC: Tameka Lopez MD

## 2018-10-01 NOTE — PROGRESS NOTES
Problem: Dysphagia (Adult) Goal: *Acute Goals and Plan of Care (Insert Text) Recommendations: 
Diet: Mechanical soft, chopped meat/thin liquid Meds: Per patient preference Aspiration Precautions Oral Care TID Goals:  Patient will: 1. Tolerate PO trials with 0 s/s overt distress in 4/5 trials 2. Utilize compensatory swallow strategies/maneuvers (decrease bite/sip, size/rate, alt. liq/sol) with min cues in 4/5 trials 3. Perform oral-motor/laryngeal exercises to increase oropharyngeal swallow function with min cues 4. Complete an objective swallow study (i.e., MBSS) to assess swallow integrity, r/o aspiration, and determine of safest LRD, min A Outcome: Progressing Towards Goal 
 
Speech LAnguage Pathology bedside swallow  
evaluation & TREATMENT Patient: Mandeep Rosario (87 y.o. female) Date: 10/1/2018 Primary Diagnosis: Stroke ([facility] [address] Precautions: Aspiration PLOF: Independent ASSESSMENT : 
Based on the objective data described below, the patient presents with mild oropharyngeal dysphagia in the setting of stroke. Patient A&Ox2 with multiple family members and OT at bedside. Family reports frequent coughing with all consistencies and that patient often eats while lying down at home. Oral-motor exam revealed pt with limited dentition; however, all other structures grossly intact for mastication and deglutition. Pt reports having dentures but does not use them while eating. Patient observed self-feeding thin liquid + straw, puree and regular solid trials. Pt demo delayed mastication and mild lingual residue, cleared with thin liquid wash. 0 overt s/sx of aspiration observed across trials. Recommend mechanical soft, chopped meat diet with thin liquids, aspiration precautions. Pt requires supervision to ensure carryover of precautions and ensure appropriate positioning prior to PO intake.   
 
Skilled therapy initiated; Educated pt and family on aspiration precautions and importance of compensatory swallow techniques to decrease aspiration risk (decrease rate of intake & sip/bite size, upright @HOB for all po intake and ~30 minutes after po); verbalized comprehension. SLP will follow as indicated. Patient will benefit from skilled intervention to address the above impairments. Patients rehabilitation potential is considered to be Fair Factors which may influence rehabilitation potential include:  
[]            None noted []            Mental ability/status []            Medical condition []            Home/family situation and support systems [x]            Safety awareness 
[]            Pain tolerance/management []            Other: PLAN : 
Recommendations and Planned Interventions: 
Mech-soft, chopped meat/thin liquid Frequency/Duration: Patient will be followed by speech-language pathology 1-2 times per day/4-7 days per week to address goals. Discharge Recommendations: To Be Determined SUBJECTIVE:  
Patient stated Thank you. OBJECTIVE:  
 
Past Medical History:  
Diagnosis Date  Acid reflux  Arthritis  Atrial fibrillation ([facility] [address]  Diabetes ([facility] [address]  High cholesterol  Hypertension  Sarcoidosis of lung ([facility] [address] Past Surgical History:  
Procedure Laterality Date  HX CHOLECYSTECTOMY Prior Level of Function/Home Situation: Independent Home Situation Home Environment: Private residence One/Two Story Residence: Two story Living Alone: No 
Support Systems: Family member(s) Patient Expects to be Discharged to[de-identified] Private residence Current DME Used/Available at Home: Vergie Late Diet prior to admission: Soft solids Current Diet:  Mech-soft, thin liquid Cognitive and Communication Status: 
Neurologic State: Alert Orientation Level: Oriented to person, Oriented to place, Disoriented to situation, Disoriented to time Cognition: Follows commands Perception: Appears intact Perseveration: No perseveration noted Safety/Judgement: Awareness of environment, Decreased awareness of need for safety Oral Assessment: 
Oral Assessment Labial: No impairment Dentition: Natural;Limited Oral Hygiene: Good Lingual: No impairment Velum: No impairment Mandible: No impairment P.O. Trials: 
Patient Position: UOG 10 Vocal quality prior to P.O.: No impairment Consistency Presented: Thin liquid; Solid;Puree How Presented: Self-fed/presented; Successive swallows;Straw Bolus Acceptance: No impairment Bolus Formation/Control: Impaired Type of Impairment: Delayed;Mastication Propulsion: Delayed (# of seconds) Oral Residue: Less than 10% of bolus; Lingual 
Initiation of Swallow: Delayed (# of seconds) Laryngeal Elevation: Functional 
Aspiration Signs/Symptoms: None Pharyngeal Phase Characteristics: Audible swallow Effective Modifications: Alternate liquids/solids;Small sips and bites Cues for Modifications: Moderate Oral Phase Severity: Mild Pharyngeal Phase Severity : Mild GCODESwallowing:  Swallow Current Status CK= 40-59%  Swallow Goal Status CI= 1-19% The severity rating is based on the following outcomes: KAY Noms Swallow Level 4 Clinical Judgement PAIN: 
Start of Eval/Tx: 0 End of Eval/Tx: 0 After treatment:  
[]            Patient left in no apparent distress sitting up in chair 
[x]            Patient left in no apparent distress in bed 
[x]            Call bell left within reach [x]            Nursing notified 
[x]            Family present 
[]            Caregiver present 
[]            Bed alarm activated COMMUNICATION/EDUCATION:  
[x]            Safe swallowing guidelines; compensatory techniques. [x]            Patient/family have participated as able in goal setting and plan of care. [x]            Patient/family agree to work toward stated goals and plan of care.  
[]            Patient understands intent and goals of therapy; neutral about participation. []            Patient unable to participate in goal setting/plan of care; educ ongoing with interdisciplinary staff 
[]         Posted safety precautions in patient's room. Thank you for this referral. 
FROILAN Decker Time Calculation: 17 mins Evaluation Time: 9 minutes Treatment Time: 8 minutes

## 2018-10-01 NOTE — PROGRESS NOTES
Hospitalist Progress Note Patient: Ayla Tee MRN: [id number]  CSN: [id number] YOB: 1932  Age: 80 y.o. Sex: female DOA: 9/30/2018 LOS:  LOS: 1 day Chief Complaint: 
 
Acute CVA Assessment/Plan Acute CVA 
 type 2 diabetes Sarcoidosis HTN Severe anemia, possible GI bleeding at home noted by daughter Syncope Chronic resp failure on 02 Diabetes, type 2 uncontrolled Discussed with family that her hgb dropping to 5.5 was worrisome for bleeding-her daughter states she recently had a blood stool at home Will ask GI to see Hgb ok for now Hold blood thinners but continue asa as she has had an acute CVA Gentle IVF Labs in am 
Echo reviewed A1c is normal 
CTA no major occlusion Workup severe anemia now Disposition : 
Patient Active Problem List  
Diagnosis Code  Dyspnea R06.00  Troponin level elevated R74.8  Wheezing R06.2  Sarcoidosis D86.9  Diabetes ([address] E11.9  Hypoxia R09.02  Stroke ([facility] [address] I63.9  Syncope R55  Chronic respiratory failure with hypoxia, on home O2 therapy (HCC) J96.11, Z99.81  
 HTN (hypertension) I10  Type II diabetes mellitus, uncontrolled ([address] E11.65  Severe anemia D64.9 Subjective: 
 
Denies weakness, paresthesias, HA, nausea, SOB Review of systems: 
 
Constitutional: denies fevers, chills Respiratory: denies SOB Cardiovascular: denies chest pain, palpitations Gastrointestinal: denies nausea, vomiting Vital signs/Intake and Output: 
Visit Vitals  /61 (BP 1 Location: Left arm, BP Patient Position: At rest;Sitting)  Pulse 92  Temp 98.9 °F (37.2 °C)  Resp 18  Ht 5' 3\" (1.6 m)  Wt 97.1 kg (214 lb)  SpO2 100%  BMI 37.91 kg/m2 Current Shift:  10/01 0701 - 10/01 1900 In: 120 [P.O.:120] Out: - Last three shifts:  09/29 1901 - 10/01 0700 In: 5 [P.O.:100] Out: 800 [Urine:800] Exam: 
 
General:elderly BF, NAD, obese, 0x3 
 CVS:irreg rhythm, reg rate, no M/R/G, S1/S2 heard, no thrill Lungs:Clear to auscultation bilaterally, no wheezes, rhonchi, or rales Abdomen: Soft, Nontender, No distention, Normal Bowel sounds, No hepatomegaly Extremities: No C/C/E, pulses palpable 2+ Neuro:grossly normal , follows commands Psych:appropriate Labs: Results:  
   
Chemistry Recent Labs  
   09/30/18 
 1000 GLU  227* NA  140  
K  4.9 CL  107 CO2  23 BUN  23* CREA  1.63* CA  8.9 AGAP  10 BUCR  14 AP  47  
TP  7.6 ALB  3.5 GLOB  4.1* AGRAT  0.9  
  
CBC w/Diff Recent Labs 10/01/18 
 2676  09/30/18 1530  09/30/18 
 1000 WBC   --    --   8.7 RBC   --    --   3.59* HGB  7.8*  5.5*  6.1*  
HCT  26.7*  19.5*  22.6* PLT   --    --   346 GRANS   --    --   81* LYMPH   --    --   14* EOS   --    --   1 Cardiac Enzymes Recent Labs  
   09/30/18 
 1000 CPK  37 CKND1  CALCULATION NOT PERFORMED WHEN RESULT IS BELOW LINEAR LIMIT Coagulation No results for input(s): PTP, INR, APTT in the last 72 hours. No lab exists for component: INREXT Lipid Panel Lab Results Component Value Date/Time Cholesterol, total 175 09/30/2018 10:00 AM  
 HDL Cholesterol 81 (H) 09/30/2018 10:00 AM  
 LDL, calculated 70 09/30/2018 10:00 AM  
 VLDL, calculated 24 09/30/2018 10:00 AM  
 Triglyceride 120 09/30/2018 10:00 AM  
 CHOL/HDL Ratio 2.2 09/30/2018 10:00 AM  
  
BNP No results for input(s): BNPP in the last 72 hours. Liver Enzymes Recent Labs  
   09/30/18 
 1000 TP  7.6 ALB  3.5 AP  47 SGOT  9* Thyroid Studies No results found for: T4, T3U, TSH, TSHEXT Procedures/imaging: see electronic medical records for all procedures/Xrays and details which were not copied into this note but were reviewed prior to creation of Plan Opal Hernandez MD

## 2018-10-01 NOTE — PROGRESS NOTES
2nd unit of PRBCs [id number] verified with EDWINA Velez. Patient consent for PRBCs on front of chart and aware of sx of reaction. /75 (BP 1 Location: Left arm, BP Patient Position: At rest)  Pulse 96  Temp 98.2 °F (36.8 °C)  Resp 18  Ht 5' 3\" (1.6 m)  Wt 97.1 kg (214 lb)  SpO2 98%  BMI 37.91 kg/m2. Will monitor. 0400-  PRBCs transfusion completed. Patient without sx of reaction. /84 (BP 1 Location: Left arm, BP Patient Position: At rest)  Pulse 96  Temp 97.8 °F (36.6 °C)  Resp 17  Ht 5' 3\" (1.6 m)  Wt 97.1 kg (214 lb)  SpO2 100%  BMI 37.91 kg/m2

## 2018-10-01 NOTE — PROGRESS NOTES
Problem: Neurolinguistics Impaired (Adult) Goal: *Acute Goals and Plan of Care (Insert Text) Cognitive-linguistic goals:  
Patient will: 1. Complete functional working memory tasks, utilizing compensatory internal and external techniques with 80% Feliz Maser 2. Recall basic auditory information with 80% Feliz Maser 3. Complete deductive reasoning/logic tasks with 80% Feliz Maser 4. Complete compare/contrast activities with 80% acc. [facility] [place] 5. Complete visuospatial tasks with 80% acc. [facility] [place] Outcome: Progressing Towards Goal 
Speech LAnguage Pathology evaluation Patient: Leonor Cruz (12 y.o. female) Date: 10/1/2018 Primary Diagnosis: Stroke ([facility] [address] Precautions:  Fall, Aspiration, Skin PLOF: Living with family ASSESSMENT : 
Based on the objective data described below, the patient presents with a moderate cognitive impairment. Patient was assessed using the [facility] Cognitive Assessment, with results as follows. Hi Cognitive Assessment [facility]):  
 
Visuo-spatial /Executive: 1. Short Trail making test 0  
2. Cube copying 0 3. Drawing clock contour 0  
4. Drawing clock numbers 0  
5. Drawing clock hands 0 Namin. Lion 1  
2. Rhinoceros 1  
3. Camel 1 Attention/Concentration: 1. Read list of digits 1 2. Read list of letters 1 3. Serial 7s 0 Language: 1. Repetition 2 2. Fluency 1 Abstraction: 1. Similarities 0 Delayed Recall: 1. Recall without cues 1 Orientation: 1. Orientation questions 6 TOTAL SCORE: 16 
Normal = 26-30 normal  
Mild cognitive impairment = 19-25 Moderate to severe = 0-18 Comment/Areas of deficit: Basic expressive/receptive language intact. Areas of deficit include isuospatial/executive functioning, abstraction and delayed recall. Patient appears unaware of errors and deficits. Patient's family report she is at her cognitive baseline at this time.   
 
Patient will benefit from skilled intervention to address the above impairments. Patients rehabilitation potential is considered to be Fair Factors which may influence rehabilitation potential include:  
[]              None noted [x]              Mental ability/status []              Medical condition []              Home/family situation and support systems []              Safety awareness []              Pain tolerance/management 
[]              Other: PLAN : 
Recommendations and Planned Interventions: 
Skilled speech therapy intervention Frequency/Duration: Patient will be followed by speech-language pathology 1-2 times per day/4-7 days per week to address goals. Discharge Recommendations: Rehab SUBJECTIVE:  
Patient stated I've been asked a lot of questions today. OBJECTIVE:  
 
Past Medical History:  
Diagnosis Date  Acid reflux  Arthritis  Atrial fibrillation ([facility] [address]  Diabetes ([facility] [address]  High cholesterol  Hypertension  Sarcoidosis of lung ([facility] [address] Past Surgical History:  
Procedure Laterality Date  HX CHOLECYSTECTOMY Prior Level of Function/Home Situation: Living with family Home Situation Home Environment: Private residence # Steps to Enter: 1 (threshold) One/Two Story Residence: Two story, live on 1st floor Living Alone: No 
Support Systems: Family member(s) Patient Expects to be Discharged to[de-identified] Rehabilitation facility Current DME Used/Available at Home: Oxygen, portable, Walker, rollator Mental Status: 
Neurologic State: Alert Orientation Level: Oriented to person, Oriented to place, Oriented to time Cognition: Decreased command following, Other (comment) (STM deficit) Perception: Tactile, Verbal, Visual 
Perseveration: Perseverates during conversation Safety/Judgement: Decreased insight into deficits, Decreased awareness of need for safety, Decreased awareness of need for assistance Motor Speech: 
Oral-Motor Structure/Motor Speech Labial: No impairment Dentition: Natural;Limited Oral Hygiene: Good Lingual: No impairment Velum: No impairment Mandible: No impairment Voice: 
   
  
Vocal Quality: No impairment GCODEMemory:  Memory Current Status CK= 40-59%  Memory Goal Status CI= 1-19% The severity rating is based on the following outcomes: KAY Noms 4 Clinical judgement PAIN: 
Start of Eval: 0 End of Eval: 0 After treatment:  
[]              Patient left in no apparent distress sitting up in chair 
[x]              Patient left in no apparent distress in bed 
[x]              Call bell left within reach [x]              Nursing notified 
[x]              Caregiver present 
[]              Bed alarm activated COMMUNICATION/EDUCATION:  
[x] Patient/family have participated as able in goal setting and plan of care. [x]  Patient/family agree to work toward stated goals and plan of care. []  Patient understands intent and goals of therapy, but is neutral about his/her participation. []  Patient is unable to participate in goal setting and plan of care. Thank you for this referral. 
Clovis Hankins, SLP Time Calculation: 20 mins

## 2018-10-01 NOTE — PROGRESS NOTES
Problem: Mobility Impaired (Adult and Pediatric) Goal: *Acute Goals and Plan of Care (Insert Text) Physical Therapy Goals Initiated 10/1/2018 and to be accomplished within 7 day(s) 1. Patient will move from supine <> sit with S in prep for out of bed activity and change of position. 2.  Patient will perform sit<> stand with S/RW in prep for transfers/ambulation. 3.  Patient will transfer from bed <> chair with S/RW for time up in chair for completion of ADL activity. 4.  Patient will ambulate 100 feet with S/RW without LOB/path deviatons for improved functional mobility/qait/safe discharge. Outcome: Progressing Towards Goal 
physical Therapy EVALUATION Patient: Nino Foreman (77 y.o. female) Date: 10/1/2018 Primary Diagnosis: Stroke ([facility] [address] Precautions:Fall, Aspiration, Skin ASSESSMENT : 
Based on the objective data described below, the patient is an 79 yo F seen on telemetry unit. Pt alert, oriented to person, place, time currently. Multiple family members (cousins and daughter) present; pt easily distracted and initially attempting dual conversations. Noted pt with UI; all family exited room for pt to receive hygiene care except pt's daughter. Pt presents with limitations in STM, decreased following of commands due to same, but pleasant and cooperative. Pt with L>R decrease in ROM/motor performance, and decreased independence in overall functional mobility. Demonstrates transition to supine >sit >stand min A/RW/vc for safety/hand position. Able to transfer to bedside chair with Kimberly/RW using slow rosita with decreased foot clearance. Nurse Comfort arrived and after resting in chair, pt received hygiene care and able to stand and perform self hygiene for perineal region with min assist/vc. Pt continued to require repeated vc for safety/technique on transfers sit<>stand, as well as during GT/RW/CGA 8ft in room.   Pt left up in chair with daughter present and all needs in reach. Note daughter reports pt amb with rollator PTA with assistance. PT without c/o pain. Recommend rehab for f/u physical therapy post discharge. Pt Education: pt educated in safety/technique during functional mobility tasks, no evidence of learning Patient will benefit from skilled intervention to address the above impairments. Patients rehabilitation potential is considered to be Fair Factors which may influence rehabilitation potential include:  
[]         None noted 
[x]         Mental ability/status [x]         Medical condition 
[]         Home/family situation and support systems 
[x]         Safety awareness 
[]         Pain tolerance/management 
[]         Other: PLAN : 
Recommendations and Planned Interventions: 
[x]           Bed Mobility Training             []    Neuromuscular Re-Education 
[x]           Transfer Training                   []    Orthotic/Prosthetic Training 
[x]           Gait Training                          []    Modalities [x]           Therapeutic Exercises          []    Edema Management/Control 
[x]           Therapeutic Activities            [x]    Patient and Family Training/Education 
[]           Other (comment): Frequency/Duration: Patient will be followed by physical therapy 1-2 times per day to address goals. Discharge Recommendations: Rehab Further Equipment Recommendations for Discharge: N/A  
 
SUBJECTIVE:  
Patient stated I feel better.  OBJECTIVE DATA SUMMARY:  
 
Past Medical History:  
Diagnosis Date  Acid reflux  Arthritis  Atrial fibrillation ([facility] [address]  Diabetes ([facility] [address]  High cholesterol  Hypertension  Sarcoidosis of lung ([facility] [address] Past Surgical History:  
Procedure Laterality Date  HX CHOLECYSTECTOMY Barriers to Learning/Limitations: yes;  cognitive Compensate with: Visual Cues, Verbal Cues and Tactile Cues Prior Level of Function/Home Situation: amb with Rollator PTA with assist at home per pt daughter Home Situation Home Environment: Private residence # Steps to Enter: 1 (threshold) One/Two Story Residence: Two story, live on 1st floor Living Alone: No 
Support Systems: Family member(s) Patient Expects to be Discharged to[de-identified] Rehabilitation facility Current DME Used/Available at Home: Oxygen, portable, Walker, rollator Critical Behavior: 
Neurologic State: Alert Orientation Level: Oriented to person;Oriented to place;Oriented to time Cognition: Decreased command following; Other (comment) (STM deficit) Safety/Judgement: Decreased insight into deficits; Decreased awareness of need for safety;Decreased awareness of need for assistance Psychosocial 
Patient Behaviors: Calm; Cooperative Family  Behaviors: Hyper-vigilant Visitor Behaviors: Anxious; Apathetic (Frustrated & agitated that pt reporting inconsistent info) Needs Expressed: Educational;Emotional 
Purposeful Interaction: Yes Pt Identified Daily Priority: Clinical issues (comment) Caritas Process: Nurture loving kindness;Enable michelle/hope;Establish trust;Nurture spiritual self;Teaching/learning Caring Interventions: Reassure Reassure: Caring rounds Therapeutic Modalities: Deep breathing;Humor; Intentional therapeutic touch Family  Behaviors: Hyper-vigilant Strength:   
Strength: Generally decreased, functional 
Tone & Sensation:  
Tone: Normal 
Sensation: Intact Range Of Motion: 
AROM: Generally decreased, functional 
PROM: Generally decreased, functional 
Functional Mobility: 
Bed Mobility: 
Supine to Sit: Minimum assistance; Additional time Scooting: Contact guard assistance; Additional time Transfers: 
Sit to Stand: Minimum assistance; Other (comment) (vc) Stand to Sit: Minimum assistance; Other (comment) (vc) Bed to Chair: Minimum assistance (vc) 
Balance:  
Sitting: Intact Standing: Impaired; With support Standing - Static: Fair Standing - Dynamic : Fair Ambulation/Gait Training: 
Distance (ft): 14 Feet (ft) Assistive Device: Gait belt;Walker, rolling (with O2 and IV in place) Ambulation - Level of Assistance: Minimal assistance Gait Description (WDL): Exceptions to [facility] Gait Abnormalities: Decreased step clearance; Step to gait Base of Support: Narrowed Speed/Peggy: Slow Step Length: Left shortened;Right shortened Swing Pattern: Right asymmetrical;Left asymmetrical 
Interventions: Safety awareness training; Tactile cues; Verbal cues; Visual/Demos Pain: 
Pain Scale 1: Numeric (0 - 10) Pain Intensity 1: 0 Activity Tolerance:  
Fair Please refer to the flowsheet for vital signs taken during this treatment. After treatment:  
[]         Patient left in no apparent distress sitting up in chair 
[]         Patient left in no apparent distress in bed 
[x]         Call bell left within reach [x]         Nursing notified-Comfort [x]         Caregiver present-daughter Arlene Mckeon 
[]         Bed alarm activated COMMUNICATION/EDUCATION:  
[x]         Fall prevention education was provided and the patient/caregiver indicated understanding. [x]         Patient/family have participated as able in goal setting and plan of care. [x]         Patient/family agree to work toward stated goals and plan of care. []         Patient understands intent and goals of therapy, but is neutral about his/her participation. []         Patient is unable to participate in goal setting and plan of care.  
 
 
Eval Complexity: History: HIGH Complexity :3+ comorbidities / personal factors will impact the outcome/ POC Exam:HIGH Complexity : 4+ Standardized tests and measures addressing body structure, function, activity limitation and / or participation in recreation  Presentation: MEDIUM Complexity : Evolving with changing characteristics  Clinical Decision Making:Medium Complexity amb <30ft w/RW/min A/vc with O2 and IV in place Overall Complexity:MEDIUM 
 
 G-Codes (GP) Mobility  Current  CJ= 20-39%   Goal  CI= 1-19% The severity rating is based on the functional mobility assessment. Thank you for this referral. 
Cate Vargas, PT Time Calculation: 41 mins

## 2018-10-01 NOTE — PROGRESS NOTES
1925 Completed shift report and assumed care from PEDRITO Horowitz RN. We reviewed SBAR, meds, and plan of care did dual neuro assessment 2030 Completed shift assessment 2150 Reassessed neuro, no change noted 2340 Completed shift report and transferred care to Guille Vigil RN. We reviewed SBAR, labs, meds, and plan of care for pt.

## 2018-10-01 NOTE — PROGRESS NOTES
Problem: Self Care Deficits Care Plan (Adult) Goal: *Acute Goals and Plan of Care (Insert Text) Initial OT STGs (10/1/2018) Within 7 days: 1. Patient will perform toilet transfer with Supervision in preparation for bowel and bladder management. 2. Patient will perform bowel and bladder management with setup for increased independence with ADLs. 3. Patient will perform UB dressing with setup while utilizing mer-techniques for increased independence with ADLs. 4. Patient will perform LB dressing with setup while utilizing mer-techniques for increased independence with ADLs. 5. Patient will visually attend to L side of environment with 1 verbal cues in preparation for ADLs. 6. Patient will perform UE exercises with minimal assist for 15 minutes to increase independence with ADLs. 7. Patient will utilize energy conservation techniques with 1 verbal cue(s) for increased independence with ADLs. 8. Patient will perform self-feeding, including opening containers w/ setup/Kirsten for increased independence with ADLs. Outcome: Progressing Towards Goal 
Occupational Therapy EVALUATION Patient: Ciarra Conley (48 y.o. female) Date: 10/1/2018 Primary Diagnosis: Stroke ([facility] [address] Precautions:  Fall, Skin, Aspiration ASSESSMENT : 
Based on the objective data described below, the patient presents with decreased functional strength, decreased functional balance, decreased overall activity tolerance limiting independence with ADLs. Patient w/ limited initiation requiring cues for attn to task and limiting distractions. Pt w/ 2 family members in room and when asking pt questions, family becoming agitated as the answers pt giving were \"not true\". De-escalation measures taken and attempting to engage both pt and family to see if pt having difficulty expressing needs.  Pt greatly limited in cognition affecting ability to initiate tasks and highly distractible w/ decreased memory. Family viewing pt as poorly motivated which may also contribute to cognition affecting pt's ADLs. Family present to see pt be capable of donning socks and assisting w/ hygiene. Pt using Rollator and O2 at home w/ family reporting pt requiring extensive assist. Encouraged HH OT for assessment of pt in home environment to see how much is cognition and how much is personality as family eluding to pt being difficult. Educated on therapy's role and inability to change pt's \"personality\". Pt would benefit from continued OT services to maximize independence in ADLs. Education: Reviewed home safety, body mechanics, signs and symptoms of a stroke, risk factors, blood sugar management in relation to stroke, and adaptive dressing techniques with patient verbalizing understanding at this time. Stroke Education: Patient educated on signs/symptoms of stroke and importance of early intervention. Patient verbalized understanding. Patient will benefit from skilled intervention to address the above impairments. Patients rehabilitation potential is considered to be Fair Factors which may influence rehabilitation potential include:  
[]             None noted [x]             Mental ability/status [x]             Medical condition [x]             Home/family situation and support systems [x]             Safety awareness []             Pain tolerance/management 
[]             Other: PLAN : 
Recommendations and Planned Interventions: 
[x]               Self Care Training                  [x]        Therapeutic Activities [x]               Functional Mobility Training    [x]        Cognitive Retraining 
[x]               Therapeutic Exercises           [x]        Endurance Activities [x]               Balance Training                   [x]        Neuromuscular Re-Education [x]               Visual/Perceptual Training     [x]   Home Safety Training [x]               Patient Education                 [x]        Family Training/Education []               Other (comment): Frequency/Duration: Patient will be followed by occupational therapy 1-2 times per day/2-4 days per week to address goals. Discharge Recommendations: Home Health Further Equipment Recommendations for Discharge: To Be Determined (TBD) at next level of care ([address] [place] [address]) SUBJECTIVE:  
Patient stated Savannah Shankararlene, I do.  (pt in agreement with most questions) OBJECTIVE DATA SUMMARY:  
 
Past Medical History:  
Diagnosis Date  Acid reflux  Arthritis  Atrial fibrillation ([facility] [address]  Diabetes ([facility] [address]  High cholesterol  Hypertension  Sarcoidosis of lung ([facility] [address] Past Surgical History:  
Procedure Laterality Date  HX CHOLECYSTECTOMY Barriers to Learning/Limitations: yes;  cognitive and physical 
Compensate with: visual, verbal, tactile, kinesthetic cues/model Prior Level of Function/Home Situation: Pt lives with family who assists w/ ADLs and reports pt mainly just sits around all day. Limited knowledge of medical diseases and disease progression. (Family would benefit from education on cognitive deficits as well as sarcoidosis as it relates to functional endurance). Educated family on limited endurance and importance of short periods of activity alternated with rest breaks Home Situation Home Environment: Private residence One/Two Story Residence: Two story Living Alone: No 
Support Systems: Family member(s) Patient Expects to be Discharged to[de-identified] Private residence Current DME Used/Available at Home: Oxygen, portable, Walker, rollator [x]  Right hand dominant   []  Left hand dominant Cognitive/Behavioral Status: 
Neurologic State: Alert; Appropriate for age Orientation Level: Oriented to person;Oriented to place Cognition: Follows commands Safety/Judgement: Decreased awareness of need for assistance;Decreased awareness of need for safety;Decreased insight into deficits Skin: appears intact Edema: no edema noted Vision/Perceptual:   
Tracking: Requires cues, head turns, or add eye shifts to track Saccades: Unable to test secondary to decreased visual attention Convergence: Unable to test secondary due to decreased visual attention Diplopia: No   
  
Coordination: 
Coordination: Generally decreased, functional (L ataxic, but grossly functional) Fine Motor Skills-Upper: Left Intact; Right Intact Gross Motor Skills-Upper: Left Intact; Right Intact Balance: 
Sitting: Intact Standing: Intact; With support Strength: 
Strength: Generally decreased, functional 
Tone & Sensation: 
Tone: Normal 
Sensation: Intact Range of Motion: 
AROM: Generally decreased, functional 
PROM: Generally decreased, functional 
 
Functional Mobility and Transfers for ADLs: 
Bed Mobility: 
Supine to Sit: Contact guard assistance; Additional time; Adaptive equipment Sit to Supine: Contact guard assistance; Adaptive equipment; Additional time Scooting: Contact guard assistance Transfers: 
Sit to Stand: Contact guard assistance Bed to Chair: Contact guard assistance ADL Assessment:  
Feeding: Setup Oral Facial Hygiene/Grooming: Contact guard assistance Bathing: Contact guard assistance;Minimum assistance; Additional time Upper Body Dressing: Contact guard assistance Lower Body Dressing: Contact guard assistance;Minimum assistance; Additional time Toileting: Contact guard assistance ADL Intervention: 
Feeding Food to Mouth: Contact guard assistance Drink to Mouth: Contact guard assistance Grooming Washing Hands: Contact guard assistance (wipes) Upper Body Dressing Assistance Hospital Gown: Minimum  assistance Lower Body Dressing Assistance Socks: Contact guard assistance Position Performed: Bending forward method;Seated edge of bed Toileting Toileting Assistance: Moderate assistance;Minimum assistance Bladder Hygiene: Contact guard assistance Clothing Management: Minimum assistance Cues: Tactile cues provided;Visual cues provided;Verbal cues provided;Physical assistance for pants down;Physical assistance for pants up Adaptive Equipment: Kiersten Smart Cognitive Retraining Orientation Retraining: Reorienting;Place;Situation;Time 
Problem Solving: Identifying the task; Inductive reason; Identifying the problem;General alternative solution;Deductive reason Executive Functions: Executing cognitive plans;Managing time;Regulating behavior Organizing/Sequencing: Breaking task down;Prioritizing Attention to Task: Distractibility; Single task Maintains Attention For (Time): 30 seconds Following Commands: Awareness of environment; Follows one step commands/directions Safety/Judgement: Decreased awareness of need for assistance;Decreased awareness of need for safety;Decreased insight into deficits Cues: Tactile cues provided;Verbal cues provided;Visual cues provided Pain: 
Pre-treatment: 0/10 Post-treatment: 0/10 Activity Tolerance:  
Patient able to stand <1 minute(s). Patient able to complete ADLs with frequent rest breaks. Patient limited by cognition/functional endurance/balance/strength. Patient unsteady Please refer to the flowsheet for vital signs taken during this treatment. After treatment:  
[] Patient left in no apparent distress sitting up in chair 
[x] Patient left in no apparent distress in bed 
[x] Call bell left within reach [x] Nursing notified/Comfort [x] Caregiver present/family [] Bed alarm activated COMMUNICATION/EDUCATION:  
[x] Home safety education was provided and the patient/caregiver indicated understanding. [x] Patient/family have participated as able in goal setting and plan of care. [x] Patient/family agree to work toward stated goals and plan of care. [] Patient understands intent and goals of therapy, but is neutral about his/her participation. [] Patient is unable to participate in goal setting and plan of care. Thank you for this referral. 
Milla Velez, OTR/L Time Calculation: 43 mins G-Codes (GP) Self Care  Current  CK= 40-59%  Goal  CI= 1-19% The severity rating is based on the professional judgement & direct observation of Level of Assistance required for Functional Mobility and ADLs. Eval Complexity: History: HIGH Complexity : Extensive review of history including physical, cognitive and psychosocial history ; Examination: HIGH Complexity : 5 or more performance deficits relating to physical, cognitive , or psychosocial skils that result in activity limitations and / or participation restrictions; Decision Making:HIGH Complexity : Patient presents with comorbidities that affect occupational performance. Signifigant modification of tasks or assistance (eg, physical or verbal) with assessment (s) is necessary to enable patient to complete evaluation

## 2018-10-01 NOTE — PROGRESS NOTES
Reason for Admission:   Min Hodge is a 80 y.o. female with PMHx of diabetes, HTN, atrial fibrillation, arthritis, acid reflux, high cholesterol, and sarcoidosis of lung who presents to the emergency department C/O left-sided weakness onset last night at 8 PM. No associated sxs. Last known normal was 8 PM last night. Pt's daughter reports the pt was leaning to the left when attempting to go downstairs for breakfast. Daughter reports pt had a stroke 3-4 years ago. Pt had physical therapy that resolved any deficits at that time. Pt denies abdominal pain, HA, left arm pain, and any other sxs or complaints. Patient admitted to telemetry for medical management of syncope RRAT Score:     18 Do you (patient/family) have any concerns for transition/discharge? Family would like her to go to acute rehab Plan for utilizing home health: If unalbe to get acceptance to rehab or acute rehab yes Likelihood of readmission?   abeba Transition of Care Plan:      Met with patient and her 2 daughters. Patient lives with daughter and they alternate taking  of her they provide her with 24 hour care but feel she needs rehab they would like cm to submit to [facility]. Cms will submit referral. Patient has home o2 she wears 2 LPM her provider for home 02 is [facility]. She has a pcp and has medicare for insurance. Patient waiting on GI consult. If patient goes to acute rehab she will not 3 midnights however if she goes to [facility] she will need 3 inpatient Midnight stays. Cm will remain available 
 
1500 telephone call from Douglas Pino from [place] [id number] she can accommodate patient at [facility] at [address], [place]. Report to [phone]. Tomorrow is the plan for d/c if MD agrees Care Management Interventions PCP Verified by CM: Yes Mode of Transport at Discharge: BLS Transition of Care Consult (CM Consult):  (acute rehab) Physical Therapy Consult:  Yes 
 Occupational Therapy Consult: Yes Current Support Network: Other Confirm Follow Up Transport: Family Plan discussed with Pt/Family/Caregiver: Yes Freedom of Choice Offered: Yes Discharge Location Discharge Placement: Rehab Unit Subacute

## 2018-10-01 NOTE — ROUTINE PROCESS
Bedside shift change report given to Awilda GOETZ,RN (oncoming nurse) by Nani Zamarripa (offgoing nurse). Report included the following information SBAR, Kardex, Intake/Output and MAR.

## 2018-10-01 NOTE — PROGRESS NOTES
NEUROLOGY PROGRESS NOTE Patient: Jean Carlos Polo        Sex: female          DOA: 9/30/2018 YOB: 1932      Age:  80 y.o.         LOS: 1 day Identification: 
80 y.o. female with history of paroxysmal afib, HTN, diabetes and HLD, who was brought to ER by her daughter due to left leg/arm weakness and a syncopal episode. SUBJECTIVE:  
Pt denies any new weakness or numbness. MRI shows a small stroke. Stroke Work-up: 
Brain MRI: 1. Possible tiny area of acute right parietal infarction, evident on diffusion only without hemorrhage or mass effect. 2. Moderately prominent ischemic white matter change. Ischemic changes in the thalami. 3. Chronic left frontal parietal and small right frontal cortical/subcortical infarctions. Numerous chronic bilateral cerebellar hemisphere infarctions. CT Head: 1. Moderate periventricular and subcortical white matter hypoattenuation with questionable subtle diminished gray-white differentiation particularly in the right MCA distribution suggesting a combination of chronic microvascular changes and age indeterminate ischemia however, suspicious for acute or subacute infarct. 2.  Motion and beam hardening artifact degrades image quality. A few subtle streaky densities along the left inferior parieto-occipital convexity is most likely artifact mimicking a subtle focus of subarachnoid hemorrhage which is considered much less likely particularly in the absence of trauma or headache CTA of head and neck: 1. No hemodynamically significant cervical vascular stenosis. 2. Focal areas of stenosis and/or more likely residual embolic material distal right MCA branches. 3. No large vessel occlusion. 4. No other significant intracranial abnormality is appreciated. Echocardiogram: · Estimated left ventricular ejection fraction is 66 - 70%. Left ventricular mild concentric hypertrophy.  Normal left ventricular wall motion, no regional wall motion abnormality noted. Mild (grade 1) left ventricular diastolic dysfunction E/E' ratio is 12. Pro [place] · Mitral valve non-specific thickening. Mild mitral valve regurgitation. · Mild tricuspid valve regurgitation is present. Pulmonary arterial systolic pressure is 35 mmHg. Mild pulmonary hypertension is present. · Mild pulmonic valve regurgitation is present. · Saline contrast was given to evaluate for intracardiac shunt. Right to left shunt seen at rest. 
Lipid panel:  
Lab Results Component Value Date/Time Cholesterol, total 175 09/30/2018 10:00 AM  
 HDL Cholesterol 81 (H) 09/30/2018 10:00 AM  
 LDL, calculated 70 09/30/2018 10:00 AM  
 VLDL, calculated 24 09/30/2018 10:00 AM  
 Triglyceride 120 09/30/2018 10:00 AM  
 CHOL/HDL Ratio 2.2 09/30/2018 10:00 AM  
 
HbA1c:  
Lab Results Component Value Date/Time Hemoglobin A1c 5.5 09/30/2018 10:00 AM  
 
REVIEW OF SYSTEMS: Denies chest pain, abdominal pain, nausea or vomiting. No fever or chills. OBJECTIVE:  
  
Visit Vitals  /74 (BP 1 Location: Left arm, BP Patient Position: At rest;Supine)  Pulse 86  Temp 98.8 °F (37.1 °C)  Resp 18  Ht 5' 3\" (1.6 m)  Wt 97.1 kg (214 lb)  SpO2 100%  BMI 37.91 kg/m2 Physical Exam: 
GEN: Alert, NAD 
EYES: conjunctiva normal, lids with out lesions HEENT: MMM. HEART: RRR +S1 +S2 LUNGS: CTA B/L no rales or rhonchi. ABDOMEN: Soft, non-tender. EXTREMITIES: No edema cyanosis SKIN: no rashes or skin breakdown, no nodules NEURO: Alert, oriented to self, hospital. Speech is fluent. Cranials: Face is symmetric. Smiles symmetrically. EOMI, VFF. Tongue is midline. Motor: Subtle left arm drift. No other weakness noted. Sensory: Intact to crude touch on all four. Coordination: Intact with no dysmetria during FNF. Current Facility-Administered Medications Medication Dose Route Frequency  albuterol (PROVENTIL HFA, VENTOLIN HFA, PROAIR HFA) inhaler 1-2 Puff 1-2 Puff Inhalation Q6H PRN  
 sodium chloride (NS) flush 5-10 mL  5-10 mL IntraVENous Q8H  
 sodium chloride (NS) flush 5-10 mL  5-10 mL IntraVENous PRN  
 0.9% sodium chloride infusion  75 mL/hr IntraVENous CONTINUOUS  
 aspirin (ASPIRIN) tablet 325 mg  325 mg Oral DAILY  heparin (porcine) injection 5,000 Units  5,000 Units SubCUTAneous Q8H  
 insulin lispro (HUMALOG) injection   SubCUTAneous AC&HS  
 glucose chewable tablet 16 g  4 Tab Oral PRN  
 glucagon (GLUCAGEN) injection 1 mg  1 mg IntraMUSCular PRN  
 dextrose (D50W) injection syrg 12.5-25 g  25-50 mL IntraVENous PRN  
 atorvastatin (LIPITOR) tablet 80 mg  80 mg Oral DAILY  0.9% sodium chloride infusion 250 mL  250 mL IntraVENous PRN  pantoprazole (PROTONIX) 40 mg in sodium chloride 0.9% 10 mL injection  40 mg IntraVENous Q12H Laboratory Recent Results (from the past 24 hour(s)) GLUCOSE, POC Collection Time: 09/30/18  9:34 AM  
Result Value Ref Range Glucose (POC) 244 (H) 70 - 110 mg/dL CBC WITH AUTOMATED DIFF Collection Time: 09/30/18 10:00 AM  
Result Value Ref Range WBC 8.7 4.6 - 13.2 K/uL  
 RBC 3.59 (L) 4.20 - 5.30 M/uL HGB 6.1 (L) 12.0 - 16.0 g/dL HCT 22.6 (L) 35.0 - 45.0 % MCV 63.0 (L) 74.0 - 97.0 FL  
 MCH 17.0 (L) 24.0 - 34.0 PG  
 MCHC 27.0 (L) 31.0 - 37.0 g/dL RDW 21.4 (H) 11.6 - 14.5 % PLATELET 494 970 - 580 K/uL MPV 8.6 (L) 9.2 - 11.8 FL  
 NEUTROPHILS 81 (H) 40 - 73 % LYMPHOCYTES 14 (L) 21 - 52 % MONOCYTES 4 3 - 10 % EOSINOPHILS 1 0 - 5 % BASOPHILS 0 0 - 2 %  
 ABS. NEUTROPHILS 7.1 1.8 - 8.0 K/UL  
 ABS. LYMPHOCYTES 1.2 0.9 - 3.6 K/UL  
 ABS. MONOCYTES 0.3 0.05 - 1.2 K/UL  
 ABS. EOSINOPHILS 0.1 0.0 - 0.4 K/UL  
 ABS. BASOPHILS 0.0 0.0 - 0.1 K/UL  
 RBC COMMENTS HYPOCHROMIA 2+ 
    
 RBC COMMENTS OVALOCYTES 2+ 
    
 RBC COMMENTS SCHISTOCYTES 
OCCASSIONAL 
    
 DF AUTOMATED METABOLIC PANEL, COMPREHENSIVE  Collection Time: 09/30/18 10:00 AM  
 Result Value Ref Range Sodium 140 136 - 145 mmol/L Potassium 4.9 3.5 - 5.5 mmol/L Chloride 107 100 - 108 mmol/L  
 CO2 23 21 - 32 mmol/L Anion gap 10 3.0 - 18 mmol/L Glucose 227 (H) 74 - 99 mg/dL BUN 23 (H) 7.0 - 18 MG/DL Creatinine 1.63 (H) 0.6 - 1.3 MG/DL  
 BUN/Creatinine ratio 14 12 - 20 GFR est AA 36 (L) >60 ml/min/1.73m2 GFR est non-AA 30 (L) >60 ml/min/1.73m2 Calcium 8.9 8.5 - 10.1 MG/DL Bilirubin, total 0.3 0.2 - 1.0 MG/DL  
 ALT (SGPT) 14 13 - 56 U/L  
 AST (SGOT) 9 (L) 15 - 37 U/L Alk. phosphatase 47 45 - 117 U/L Protein, total 7.6 6.4 - 8.2 g/dL Albumin 3.5 3.4 - 5.0 g/dL Globulin 4.1 (H) 2.0 - 4.0 g/dL A-G Ratio 0.9 0.8 - 1.7 CARDIAC PANEL,(CK, CKMB & TROPONIN) Collection Time: 09/30/18 10:00 AM  
Result Value Ref Range CK 37 26 - 192 U/L  
 CK - MB <1.0 <3.6 ng/ml CK-MB Index  0.0 - 4.0 % CALCULATION NOT PERFORMED WHEN RESULT IS BELOW LINEAR LIMIT Troponin-I, Qt. 0.03 0.00 - 0.06 NG/ML  
LIPID PANEL Collection Time: 09/30/18 10:00 AM  
Result Value Ref Range LIPID PROFILE Cholesterol, total 175 <200 MG/DL Triglyceride 120 <150 MG/DL  
 HDL Cholesterol 81 (H) 40 - 60 MG/DL  
 LDL, calculated 70 0 - 100 MG/DL VLDL, calculated 24 MG/DL  
 CHOL/HDL Ratio 2.2 0 - 5.0 HEMOGLOBIN A1C WITH EAG Collection Time: 09/30/18 10:00 AM  
Result Value Ref Range Hemoglobin A1c 5.5 4.5 - 5.6 % Est. average glucose 111 mg/dL EKG, 12 LEAD, INITIAL Collection Time: 09/30/18 10:19 AM  
Result Value Ref Range Ventricular Rate 105 BPM  
 Atrial Rate 105 BPM  
 P-R Interval 160 ms QRS Duration 76 ms  
 Q-T Interval 330 ms QTC Calculation (Bezet) 436 ms Calculated P Axis 71 degrees Calculated R Axis 48 degrees Calculated T Axis 44 degrees Diagnosis Sinus tachycardia with premature supraventricular complexes Otherwise normal ECG When compared with ECG of 11-JUN-2015 12:46, 
 premature supraventricular complexes are now present ECHO ADULT COMPLETE Collection Time: 09/30/18  2:40 PM  
Result Value Ref Range LA Volume 47.08 22 - 52 mL Right Atrial Area 4C 8.20 cm2 Ao Root D 3.20 cm  
 AO ASC D 3.08 cm  
 AO ARCH D 3.25 cm Aortic Valve Systolic Peak Velocity 843.71 cm/s AoV VTI 41.42 cm Aortic Valve Area by Continuity of Peak Velocity 2.2 cm2 Aortic Valve Area by Continuity of VTI 2.2 cm2 AoV PG 20.1 mmHg LVIDd 3.51 (A) 3.9 - 5.3 cm  
 LVPWd 1.17 (A) 0.6 - 0.9 cm LVIDs 2.51 cm IVSd 1.17 (A) 0.6 - 0.9 cm  
 LV ED Vol A2C 84.0 mL  
 LV ES Vol A4C 27.6 mL  
 LV ES Vol BP 24.2 19 - 49 mL  
 LVOT d 2.10 cm LVOT Peak Velocity 144.34 cm/s LVOT Peak Gradient 8.3 mmHg LVOT VTI 26.22 cm LV E' Septal Velocity 0.70 cm/s LV E' Lateral Velocity 0.70 cm/s  
 MVA (PHT) 2.6 cm2  
 MV A Moris 117.12 cm/s  
 MV E Moris 0.87 cm/s  
 MV E/A 0.01   
 RVIDd 4.07 cm Aortic Valve Systolic Mean Gradient 83.0 mmHg BP EF 69.4 55 - 100 % LV Ejection Fraction MOD 4C 62 % LV Ejection Fraction MOD 2C 75 % LA Vol 4C 44.02 22 - 52 mL  
 LA Vol 2C 45.24 22 - 52 mL  
 LV Mass .3 67 - 162 g  
 LV Mass AL Index 75.5 g/m2 E/E' lateral 1.24   
 E/E' septal 1.24 IVC proximal 2.00 cm  
 E/E' ratio (averaged) 1.24 LV ES Vol A2C 21.3 mL  
 LVES Vol Index BP 12.2 mL/m2 LV ED Vol A4C 73.4 mL  
 LVED Vol Index BP 39.7 mL/m2 Mitral Valve E Wave Deceleration Time 287.4 ms Mitral Valve Pressure Half-time 83.4 ms Left Atrium Major Axis 3.25 cm Tapse 2.00 1.5 - 2.0 cm Triscuspid Valve Regurgitation Peak Gradient 31.9 mmHg LV ED Vol BP 79.1 56 - 104 ml  
 TR Max Velocity 282.29 cm/s  
 LA Vol Index 23.66 ml/m2 LA Vol Index 22.73 ml/m2 LA Vol Index 22.12 ml/m2 LVED Vol Index A4C 36.9 mL/m2 LVED Vol Index A2C 42.2 mL/m2 LVES Vol Index A4C 13.9 mL/m2 LVES Vol Index A2C 10.7 mL/m2 STANISLAW/BSA Pk Moris 1.1 cm2/m2 STANISLAW/BSA VTI 1.1 cm2/m2 HGB & HCT Collection Time: 09/30/18  3:30 PM  
Result Value Ref Range HGB 5.5 (LL) 12.0 - 16.0 g/dL HCT 19.5 (L) 35.0 - 45.0 % TYPE + CROSSMATCH Collection Time: 09/30/18  3:30 PM  
Result Value Ref Range Crossmatch Expiration 10/03/2018 ABO/Rh(D) A POSITIVE Antibody screen NEG   
 CALLED TO:    
  2 UNITS READY NOTIFIED PEDRITO ACOSTA RN 3A AT 1640 ON 9/30/18 BY SRAVANTHI. Unit number [id number] Blood component type [facility] Unit division 00 Status of unit TRANSFUSED Crossmatch result Compatible Unit number [id number] Blood component type [facility] Unit division 00 Status of unit ISSUED Crossmatch result Compatible RETICULOCYTE COUNT Collection Time: 09/30/18  3:30 PM  
Result Value Ref Range Reticulocyte count 1.9 0.5 - 2.3 % GLUCOSE, POC Collection Time: 09/30/18  9:55 PM  
Result Value Ref Range Glucose (POC) 105 70 - 110 mg/dL URINALYSIS W/MICROSCOPIC Collection Time: 10/01/18  1:50 AM  
Result Value Ref Range Color YELLOW Appearance CLEAR Specific gravity 1.017 1.005 - 1.030    
 pH (UA) 5.0 5.0 - 8.0 Protein NEGATIVE  NEG mg/dL Glucose NEGATIVE  NEG mg/dL Ketone NEGATIVE  NEG mg/dL Bilirubin NEGATIVE  NEG Blood NEGATIVE  NEG Urobilinogen 0.2 0.2 - 1.0 EU/dL Nitrites NEGATIVE  NEG Leukocyte Esterase NEGATIVE  NEG    
 WBC 0 to 2 0 - 5 /hpf  
 RBC NEGATIVE  0 - 5 /hpf Epithelial cells FEW 0 - 5 /lpf Bacteria FEW (A) NEG /hpf Mucus NEGATIVE  NEG /lpf HGB & HCT Collection Time: 10/01/18  6:08 AM  
Result Value Ref Range HGB 7.8 (L) 12.0 - 16.0 g/dL HCT 26.7 (L) 35.0 - 45.0 % GLUCOSE, POC Collection Time: 10/01/18  7:13 AM  
Result Value Ref Range Glucose (POC) 129 (H) 70 - 110 mg/dL Radiology: 
Mri Brain Wo Cont Result Date: 9/30/2018 Brain MR without contrast: Indication: Left-sided arm and leg weakness. History of atrial fibrillation. Procedure: Sagittal spin echo T1, axial FSE FLAIR and T2 and axial diffusion weighted scanning was performed. An axial T2* scan optimized to detect hemosiderin and calcium was performed. Coronal spin echo T1 images were also performed. No contrast was administered. Comparison exam: Head CT earlier same day. Findings: Diffusion: There is an area of low level increased signal diffusion in the right parietal lobe which does appear to have a hypointense ADC map correlate. Potential minimal FLAIR correlate, not definitive. No hemorrhage or mass effect, possible tiny acute right parietal cortical infarction. The T2* scan shows no acute or chronic hemorrhage or abnormal calcifications. Brain parenchyma: Moderately prominent ischemic changes in the fatoumata. Chronic infarctions in both cerebellar hemispheres are noted. Patchy ischemic changes in the thalamus bilaterally. Confluent patchy ischemic changes periventricular and deep white matter. Chronic left frontal parietal cortical/subcortical white matter infarction. Small chronic superior lateral right frontal cortical/subcortical infarction. No mass or mass effect. Ventricles and sulci: Normal, no significant brain atrophy. Extra axial: No extra axial fluid collection or mass. Brain vasculature: Normal, no arterial vascular abnormality is noted. Craniocervical Junction: Normal. Extracranial and skull base:  Normal.  
 
Impression: 1. Possible tiny area of acute right parietal infarction, evident on diffusion only without hemorrhage or mass effect. 2. Moderately prominent ischemic white matter change. Ischemic changes in the thalami. 3. Chronic left frontal parietal and small right frontal cortical/subcortical infarctions. Numerous chronic bilateral cerebellar hemisphere infarctions. Ct Head Wo Cont Result Date: 9/30/2018 EXAM: CT HEAD WO CONT CLINICAL INDICATION/HISTORY: Stroke;  Code S. -Additional: None COMPARISON: None. TECHNIQUE: Axial CT imaging of the head was performed without intravenous contrast. Sagittal and coronal reconstructions are provided. One or more dose reduction techniques were used on this CT: automated exposure control, adjustment of the mAs and/or kVp according to patient size, and iterative reconstruction techniques. The specific techniques used on this CT exam have been documented in the patient's electronic medical record. _______________ FINDINGS: Image quality is degraded by a moderate degree of motion as well as fairly extensive beam hardening artifacts resulting in scattered streaky densities particularly at the region of the inferior parietal occipital and skull base. BRAIN AND POSTERIOR FOSSA: There are moderate scattered and confluent foci of decreased attenuation in the periventricular white matter which are nonspecific but most likely sequelae of chronic microvascular disease. In addition, there is questionable subtle loss of the gray-white differentiation in the right frontal lobe corresponding to the MCA distribution including on axial series images 18 and 19. No significant sulcal effacement. No intraparenchymal hemorrhages are identified. EXTRA-AXIAL SPACES AND MENINGES: No definite hemorrhages are identified. However, there are one or 2 streaky densities along the periphery of the left inferior parieto-occipital convexity which are favored to represent beam hardening artifact mimicking the appearance of subtle subarachnoid hemorrhage which is felt to be much less likely particularly in the absence of trauma or sudden onset headache. CALVARIUM: Intact. SINUSES: Clear. OTHER: None. _______________ IMPRESSION: 1.   Moderate periventricular and subcortical white matter hypoattenuation with questionable subtle diminished gray-white differentiation particularly in the right MCA distribution suggesting a combination of chronic microvascular changes and age indeterminate ischemia however, suspicious for acute or subacute infarct. 2.  Motion and beam hardening artifact degrades image quality. A few subtle streaky densities along the left inferior parieto-occipital convexity is most likely artifact mimicking a subtle focus of subarachnoid hemorrhage which is considered much less likely particularly in the absence of trauma or headache. Otherwise, no evidence of acute intracranial hemorrhage. I communicated the above findings to Dr. Kali De Los Santos via telephone at 10:05 AM on 9/30/2018. Xr Chest [facility] Result Date: 9/30/2018 EXAM: XR CHEST PORT CLINICAL INDICATION/HISTORY: CVA  >Additional: None COMPARISON: Chest x-ray most recently 6/14/2015  >Reference exam: Chest CT from 2/21/2015 TECHNIQUE: Portable chest. _______________ FINDINGS: SUPPORT LINES AND TUBES: None. HEART AND MEDIASTINUM: The heart is partially obscured due to the low lung volumes but grossly stable in size and contour. Bilateral hilar prominence in keeping with adenopathy seen to better advantage on prior CT from February 2015. LUNGS AND PLEURAL SPACES: The lungs remain underexpanded with basilar volume loss. Band of subsegmental atelectasis in the left perihilar region is noted. BONY THORAX AND SOFT TISSUES: The bones and soft tissues are within normal limits. _______________ IMPRESSION: 1. Hypoventilatory changes and bibasilar volume loss, similar to the prior examination. No acute cardiopulmonary abnormality or significant interval change. Cta Head Neck W Cont Result Date: 9/30/2018 Brain CT angiogram and Neck CT angiogram: Indication: Evaluate for stenosis. Possible infarction. Evaluate for source of embolus. Left-sided weakness. History of infarction. Procedure: Neck CT angiogram: Contrast was administered with a power injector.   Axial thin section volume acquisition  scans were obtained timed for peak arterial enhancement. An automated triggering system was used. Axial images were generated. Angiographic coronal and sagittal reformations were also generated. Extensive 3-D separate workstation processing was performed by [employer]. Brain CT angiogram: Dedicated slab MIP overlapping angiographic reconstructions in the sagittal, axial and coronal plane of the brain component of the axial evaluation were also performed. Extensive 3-D separate workstation processing was performed by [employer]. One or more dose reduction techniques were used on this CT: automated exposure control, adjustment of the mAs and/or kVp according to patient size, and iterative reconstruction techniques. The specific techniques used on this CT exam have been documented in the patient's electronic medical record. Comparison exam: None. Findings: Neck CTA: Any internal carotid stenosis described below uses NASCET criteria, minimal residual lumen diameter versus the non-tapering cervical internal carotid artery. Aortic Arch: Normal branching pattern. No proximal great vessel stenosis. Substantial tortuosity proximal great vessels but no stenosis. Left carotid: No stenosis or other vascular abnormality. Tuberosity of both the common and internal carotid with medial tortuosity approaching kissing of the carotid bifurcation retropharyngeal and loops of the internal carotid artery. Right carotid:  No stenosis or other vascular abnormality. Tuberosity of both the common and internal carotid with medial tortuosity approaching kissing of the carotid bifurcation retropharyngeal and loops of the internal carotid artery. The vertebral arteries are mildly left dominant. Right vertebral artery:  No stenosis or other vascular abnormality. Left vertebral artery:  No stenosis or other vascular abnormality. Lung apices:  Apical bulla on the right. No suspicious mass.  Somewhat numerous not particularly enlarged right paratracheal and precarinal lymph nodes. The largest precarinal lymph node measures 21 x 16 mm, mildly enlarged. Neck soft tissues: Medial retropharyngeal tortuosity of both carotid arteries with tortuous cervical internal carotid arteries. No neck soft tissue abnormality. Brain CTA: Carotid siphon and supraclinoid internal carotid artery: No significant stenosis. M1 segment and proximal M2 segment MCA:  No significant stenosis or aneurysm. A1 segment, anterior communicating artery and proximal A2 segments:  No significant stenosis or aneurysm. Vertebrobasilar system:  Fetal origin left PCA. Distal anterior cerebral artery:  No significant stenosis or aneurysm. Distal MCA M2/M3 segment:  Areas of focal stenosis and/or more likely filling defects consistent with residual embolic material are noted in distal right MCA branches, superior division branches, distal M3 and M4 segments in the sylvian fissure, definitive on MIP angiographic images. No other significant vascular abnormality. Brain parenchyma on source data:  No significant parenchymal brain abnormality. Impression: 1. No hemodynamically significant cervical vascular stenosis. 2. Focal areas of stenosis and/or more likely residual embolic material distal right MCA branches. 3. No large vessel occlusion. 4. No other significant intracranial abnormality is appreciated. ASSESSMENT/IMPRESSION:  
Possible lacunar right MCA stroke. The patient needs to be on anticoagulation if she has no fall risks (family tells that she does not fall at all). RECOMMENDATIONS: 
1. Fall risk assessment. If no fall risk, she needs to be on Eliquis rather than aspirin or plavix. 2. Continue telemetry monitoring 3. Neuro checks per stroke protocol 4. Normotensive protocol. 5. PT/OT and dispo planning/ 
6. Lipitor 40mg daily. 7. Consider cardiology consult (can be outpatient). I will follow the patient. Please do not hesitate to return with any questions. Signed: 
Gauri Bernabe MD 
10/1/2018 
8:47 AM

## 2018-10-01 NOTE — PROGRESS NOTES
Iron deficiency anemia on ASA Plavix but also on Celebrex bid x 2 months. In the last couple of months there has been diarrhea and abdominal pain. Most likely she has colon cancer. Should not take anymore celebrex. Would do colonoscopy this Wednesday to allow her to prep properly

## 2018-10-01 NOTE — PROGRESS NOTES
10/1/2018 PT note: consult received and chart reviewed. Evaluation attempted. Pt currently eating meal and family present. Will f/u at later time as pt schedule allows for PT evaluation. Thank you.   
Benji Gruber, PT

## 2018-10-02 NOTE — PROGRESS NOTES
Problem: Mobility Impaired (Adult and Pediatric) Goal: *Acute Goals and Plan of Care (Insert Text) Physical Therapy Goals Initiated 10/1/2018 and to be accomplished within 7 day(s) 1. Patient will move from supine <> sit with S in prep for out of bed activity and change of position. 2.  Patient will perform sit<> stand with S/RW in prep for transfers/ambulation. 3.  Patient will transfer from bed <> chair with S/RW for time up in chair for completion of ADL activity. 4.  Patient will ambulate 100 feet with S/RW without LOB/path deviatons for improved functional mobility/qait/safe discharge. Outcome: Progressing Towards Goal 
physical Therapy TREATMENT Patient: Adan Xie (7 y.o. female) Date: 10/2/2018 Diagnosis: Stroke [facility]) Syncope Precautions: Fall, Aspiration, Skin Chart, physical therapy assessment, plan of care and goals were reviewed. ASSESSMENT: 
amb distance increased with decreased assistance, but cuing is still needed for directional and postural feedback. 3 rest breaks require (~ 45 sec per) to accomplish today's distance. Progression toward goals: 
[]      Improving appropriately and progressing toward goals [x]      Improving slowly and progressing toward goals 
[]      Not making progress toward goals and plan of care will be adjusted PLAN: 
Patient continues to benefit from skilled intervention to address the above impairments. Continue treatment per established plan of care. Discharge Recommendations:  Jarrett Roberts or To Be Determined Further Equipment Recommendations for Discharge:  portable oxygen and rolling walker SUBJECTIVE:  
Patient stated I don''t like this stuff at all. It's doing a number on my stomach.  (Bowel prep) OBJECTIVE DATA SUMMARY:  
Critical Behavior: 
Neurologic State: Alert Orientation Level: Oriented to person, Oriented to place, Disoriented to time Cognition: Follows commands Safety/Judgement: Decreased insight into deficits, Decreased awareness of need for safety, Decreased awareness of need for assistance Functional Mobility Training: 
Bed Mobility: 
Rolling: Stand-by assistance Supine to Sit: Stand-by assistance Scooting: Stand-by assistance Transfers: 
Sit to Stand: Stand-by assistance Stand to Sit: Stand-by assistance Bed to Chair: Supervision Balance: 
Sitting: Intact Standing: Intact; With support Standing - Static: Good Standing - Dynamic : Fair Ambulation/Gait Training: 
Distance (ft): 90 Feet (ft) Assistive Device: Gait belt;Walker, rolling Ambulation - Level of Assistance: Minimal assistance Gait Abnormalities: Decreased step clearance; Step to gait Base of Support: Narrowed Speed/Peggy: Slow Step Length: Left shortened;Right shortened Swing Pattern: Left asymmetrical;Right asymmetrical 
Interventions: Safety awareness training; Tactile cues; Verbal cues; Visual/Demos Pain: 
Pain Scale 1: Numeric (0 - 10) Pain Intensity 1: 0 Pain out: 0 Activity Tolerance:  
Good- 
Please refer to the flowsheet for vital signs taken during this treatment. After treatment:  
[] Patient left in no apparent distress sitting up in chair 
[x] Patient left in no apparent distress in bed 
[x] Call bell left within reach [x] Nursing notified 
[] Caregiver present 
[] Bed alarm activated Gregorio Streeter PTA Time Calculation: 29 mins

## 2018-10-02 NOTE — PROGRESS NOTES
0845: assumed care of pt. Assessment completed. Pt denies pain. Family at bedside, updated on pt condition. Afebrile and vital signs stable. Call light left in reach. Will continue to monitor. 1530: reassessment completed. No acute changes noted at this time. Pt denies pain. No s/s of distress noted. Call light left in reach. Afebrile and vital signs stable. 1900: Bedside and Verbal shift change report given to Heather Urbina RN (oncoming nurse) by PEDRITO Beck RN (offgoing nurse). Report included the following information SBAR, Kardex, Intake/Output, MAR and Recent Results.

## 2018-10-02 NOTE — PROGRESS NOTES
Transition of care: anticipate rehab when medically cleared 
 met with patient at bedside and met with IDR team. Patient is to have colonoscopy tomorrow and then if cleared will anticipate d/c. [facility] had informed cm yesterday they would be able to accommodate however,she is not ready for d/c will reassess tomorrow

## 2018-10-02 NOTE — PROGRESS NOTES
Verbal bedside received from [facility] off going nurse. Assumed patient care, bed in low position, call light within reach, white board updated. 2100 Meds administered, No complains of pain. Family with patient for the night.  
 
0600 Patient had no complains, had uneventful night. NAD Bedside and Verbal shift change report given to [address] [place] [address] [address] (oncoming nurse) by Jovanni Singh (offgoing nurse). Report included the following information SBAR, Kardex and MAR.

## 2018-10-02 NOTE — ROUTINE PROCESS
0745 assumed care of pt after bedside verbal report was given by off going nurse, pt awake in bed and oriented to persona and place, daughter at bedside, no acute distress noted, pt denies c/o pain, will monitor 1137 pt sitting up in the chair awake, daughter at bedside, no acute distress noted, will monitor 1302 assisted pt back to bed, bed alarm left on and call bell at pt's bedside 1713 colonoscopy prep started, bedside commode at bedside, pt left with bed alarm on and call bell at her bedside 1802 pt ambulating in hallway with PT, pt tachycardic with heart rate 130s, pt asymptomatic, will monitor 1815 pt back in bed resting quietly, no acute distress noted, heart rate in 70s, will monitor 1931 Bedside and Verbal shift change report given to EDWINA Richard (oncoming nurse) by EDWINA Buckley (offgoing nurse). Report included the following information SBAR, Kardex and MAR.

## 2018-10-02 NOTE — PROGRESS NOTES
ARU/IPR REFERRAL CONTACT NOTE [id number] Melodie Vivar for Physical Rehabilitation Re: Sharon Jang Follow up on IP Consult for IP Rehab Screen. Patient positive for tiny area of acute right parietal infarction per MRI. PT/OT/ST on board. Per Gastroenterology consult patient may have colon cancer - plan for colonoscopy on Wednesday. Will advise following results of procedure and updated status with tx. Thank you for the consult. Elgin Grullon

## 2018-10-02 NOTE — PROGRESS NOTES
Problem: Pressure Injury - Risk of 
Goal: *Prevention of pressure injury Document Tavares Scale and appropriate interventions in the flowsheet. Outcome: Progressing Towards Goal 
Pressure Injury Interventions: 
Sensory Interventions: Turn and reposition approx. every two hours (pillows and wedges if needed) Moisture Interventions: Apply protective barrier, creams and emollients Activity Interventions: PT/OT evaluation, Pressure redistribution bed/mattress(bed type) Mobility Interventions: PT/OT evaluation, Pressure redistribution bed/mattress (bed type) Nutrition Interventions: Offer support with meals,snacks and hydration, Document food/fluid/supplement intake Friction and Shear Interventions: Apply protective barrier, creams and emollients Problem: Falls - Risk of 
Goal: *Absence of Falls Document Dannial Shadow Fall Risk and appropriate interventions in the flowsheet. Outcome: Progressing Towards Goal 
Fall Risk Interventions: 
Mobility Interventions: Patient to call before getting OOB Mentation Interventions: Bed/chair exit alarm, Door open when patient unattended Medication Interventions: Teach patient to arise slowly, Patient to call before getting OOB Elimination Interventions: Bed/chair exit alarm, Call light in reach History of Falls Interventions: Evaluate medications/consider consulting pharmacy, Room close to nurse's station

## 2018-10-02 NOTE — PROGRESS NOTES
NEUROLOGY PROGRESS NOTE Patient: Jean Carlos Polo        Sex: female          DOA: 9/30/2018 YOB: 1932      Age:  80 y.o.         LOS: 2 days Identification: 
80 y.o. female with history of paroxysmal afib, HTN, diabetes and HLD, who was brought to ER by her daughter due to left leg/arm weakness and a syncopal episode. 
  
SUBJECTIVE:  
Pt was found to have anemia. MRI shows a small stroke per the report. I looked at the images, but I am not too impressed. Stroke is possible however. 
  
Stroke Work-up: 
Brain MRI: 1. Possible tiny area of acute right parietal infarction, evident on diffusion only without hemorrhage or mass effect. 2. Moderately prominent ischemic white matter change. Ischemic changes in the thalami. 3. Chronic left frontal parietal and small right frontal cortical/subcortical infarctions. Numerous chronic bilateral cerebellar hemisphere infarctions. CT Head: 1. Moderate periventricular and subcortical white matter hypoattenuation with questionable subtle diminished gray-white differentiation particularly in the right MCA distribution suggesting a combination of chronic microvascular changes and age indeterminate ischemia however, suspicious for acute or subacute infarct. 2.  Motion and beam hardening artifact degrades image quality. A few subtle streaky densities along the left inferior parieto-occipital convexity is most likely artifact mimicking a subtle focus of subarachnoid hemorrhage which is considered much less likely particularly in the absence of trauma or headache CTA of head and neck: 1. No hemodynamically significant cervical vascular stenosis. 2. Focal areas of stenosis and/or more likely residual embolic material distal right MCA branches. 3. No large vessel occlusion. 4. No other significant intracranial abnormality is appreciated. Echocardiogram: · Estimated left ventricular ejection fraction is 66 - 70%.  Left ventricular mild concentric hypertrophy. Normal left ventricular wall motion, no regional wall motion abnormality noted. Mild (grade 1) left ventricular diastolic dysfunction E/E' ratio is 12. Mordecai [place] · Mitral valve non-specific thickening. Mild mitral valve regurgitation. · Mild tricuspid valve regurgitation is present. Pulmonary arterial systolic pressure is 35 mmHg. Mild pulmonary hypertension is present. · Mild pulmonic valve regurgitation is present. · Saline contrast was given to evaluate for intracardiac shunt. Right to left shunt seen at rest. 
Lipid panel:  
     
Lab Results Component Value Date/Time  
  Cholesterol, total 175 09/30/2018 10:00 AM  
  HDL Cholesterol 81 (H) 09/30/2018 10:00 AM  
  LDL, calculated 70 09/30/2018 10:00 AM  
  VLDL, calculated 24 09/30/2018 10:00 AM  
  Triglyceride 120 09/30/2018 10:00 AM  
  CHOL/HDL Ratio 2.2 09/30/2018 10:00 AM  
  
HbA1c:  
     
Lab Results Component Value Date/Time  
  Hemoglobin A1c 5.5 09/30/2018 10:00 AM  
  
REVIEW OF SYSTEMS: Denies chest pain, abdominal pain, nausea or vomiting. No fever or chills. 
  
OBJECTIVE:  
  
Visit Vitals  /58 (BP 1 Location: Left arm, BP Patient Position: At rest)  Pulse 89  Temp 98.5 °F (36.9 °C)  Resp 18  Ht 5' 3\" (1.6 m)  Wt 97.5 kg (215 lb)  SpO2 96%  BMI 38.09 kg/m2 Physical Exam: 
GEN: Alert, NAD 
EYES: conjunctiva normal, lids with out lesions HEENT: MMM. HEART: RRR +S1 +S2 LUNGS: CTA B/L no rales or rhonchi. ABDOMEN: Soft, non-tender. EXTREMITIES: No edema cyanosis SKIN: no rashes or skin breakdown, no nodules NEURO: Alert, oriented x3. Speech is fluent. Cranials: Face is symmetric. Smiles symmetrically. EOMI, VFF. Tongue is midline. Motor: Full strength at all sites. No pronator drift. Sensory: Intact to crude touch on all four. Coordination: Intact with no dysmetria during FNF. Current Facility-Administered Medications Medication Dose Route Frequency  polyethylene glycol (MIRALAX) packet 17 g  17 g Oral BID  peg 3350-electrolytes (COLYTE) 4000 mL  4,000 mL Oral ONCE  
 albuterol (PROVENTIL HFA, VENTOLIN HFA, PROAIR HFA) inhaler 1-2 Puff  1-2 Puff Inhalation Q6H PRN  
 sodium chloride (NS) flush 5-10 mL  5-10 mL IntraVENous Q8H  
 sodium chloride (NS) flush 5-10 mL  5-10 mL IntraVENous PRN  
 aspirin (ASPIRIN) tablet 325 mg  325 mg Oral DAILY  insulin lispro (HUMALOG) injection   SubCUTAneous AC&HS  
 glucose chewable tablet 16 g  4 Tab Oral PRN  
 glucagon (GLUCAGEN) injection 1 mg  1 mg IntraMUSCular PRN  
 dextrose (D50W) injection syrg 12.5-25 g  25-50 mL IntraVENous PRN  
 atorvastatin (LIPITOR) tablet 80 mg  80 mg Oral DAILY  0.9% sodium chloride infusion 250 mL  250 mL IntraVENous PRN  pantoprazole (PROTONIX) 40 mg in sodium chloride 0.9% 10 mL injection  40 mg IntraVENous Q12H Laboratory Recent Results (from the past 24 hour(s)) GLUCOSE, POC Collection Time: 10/01/18 12:18 PM  
Result Value Ref Range Glucose (POC) 215 (H) 70 - 110 mg/dL GLUCOSE, POC Collection Time: 10/01/18  4:10 PM  
Result Value Ref Range Glucose (POC) 88 70 - 110 mg/dL GLUCOSE, POC Collection Time: 10/01/18  9:04 PM  
Result Value Ref Range Glucose (POC) 149 (H) 70 - 110 mg/dL CBC W/O DIFF Collection Time: 10/02/18  2:20 AM  
Result Value Ref Range WBC 6.2 4.6 - 13.2 K/uL  
 RBC 3.83 (L) 4.20 - 5.30 M/uL HGB 7.7 (L) 12.0 - 16.0 g/dL HCT 26.1 (L) 35.0 - 45.0 % MCV 68.1 (L) 74.0 - 97.0 FL  
 MCH 20.1 (L) 24.0 - 34.0 PG  
 MCHC 29.5 (L) 31.0 - 37.0 g/dL RDW 24.9 (H) 11.6 - 14.5 % PLATELET 273 466 - 998 K/uL MPV 8.3 (L) 9.2 - 20.1 FL  
METABOLIC PANEL, BASIC Collection Time: 10/02/18  2:20 AM  
Result Value Ref Range Sodium 142 136 - 145 mmol/L Potassium 4.8 3.5 - 5.5 mmol/L Chloride 108 100 - 108 mmol/L  
 CO2 28 21 - 32 mmol/L Anion gap 6 3.0 - 18 mmol/L Glucose 97 74 - 99 mg/dL BUN 17 7.0 - 18 MG/DL Creatinine 1.33 (H) 0.6 - 1.3 MG/DL  
 BUN/Creatinine ratio 13 12 - 20 GFR est AA 46 (L) >60 ml/min/1.73m2 GFR est non-AA 38 (L) >60 ml/min/1.73m2 Calcium 8.8 8.5 - 10.1 MG/DL  
GLUCOSE, POC Collection Time: 10/02/18  6:53 AM  
Result Value Ref Range Glucose (POC) 123 (H) 70 - 110 mg/dL ASSESSMENT/IMPRESSION:  
Syncope and a possible lacunar right MCA stroke. As stated above, I am not too impressed with the [facility] image. Main problem seems to be syncope in the setting of anemia. She seems to have fall risks and she may not be a good candidate for anticoagulation at this time. I would prefer only aspirin for her at this time. We will d/c plavix due to anemia and ongoing  if she has RECOMMENDATIONS: 
1. Aspirin 81mg daily forn ow (if feasible form GI perspective). No Plavix. 2. Continue telemetry monitoring 3. Neuro checks per stroke protocol 4. Normotensive protocol. 5. PT/OT and dispo planning/ 
6. Lipitor 40mg daily. 7. Consider cardiology consult (can be outpatient). 8. We will decide further stroke prevention measures as outpatient. If she has GI bleeding, she may not be a good candidate for [facility] [place], either. Neurology signs off at this time.  
 
I will follow the patient. Please do not hesitate to return with any questions.  
 
Signed: 
Kulwant Salcido MD 
10/2/2018 
8:01 AM

## 2018-10-02 NOTE — PROGRESS NOTES
Hospitalist Progress Note Patient: Mary Kay Walker MRN: [id number]  CSN: [id number] YOB: 1932  Age: 80 y.o. Sex: female DOA: 9/30/2018 LOS:  LOS: 2 days Chief Complaint: cva Assessment/Plan Disposition : 
Patient Active Problem List  
Diagnosis Code  Dyspnea R06.00  Troponin level elevated R74.8  Wheezing R06.2  Sarcoidosis D86.9  Diabetes ([facility] E11.9  Hypoxia R09.02  Stroke ([facility] I63.9  Syncope R55  Chronic respiratory failure with hypoxia, on home O2 therapy (HCC) J96.11, Z99.81  
 HTN (hypertension) I10  Type II diabetes mellitus, uncontrolled ([facility] E11.65  Severe anemia D64.9  
 
 
81 y/o female with CVA. -Acute CVA. -Type 2 diabetes. -Sarcoidosis. 
-HTN. -Severe anemia, possible GI bleeding. 
-Syncope. -Chronic resp failure on 02. 
-Diabetes, type 2 uncontrolled. Colonoscopy planned. Need gi clearance for anti-platelet therapy. Doing well presently. Subjective: 
 
Seen and examined. Review of systems: 
 
Constitutional: denies fevers, chills, myalgias Respiratory: denies SOB, cough Cardiovascular: denies chest pain, palpitations Gastrointestinal: denies nausea, vomiting, diarrhea Vital signs/Intake and Output: 
Visit Vitals  /56 (BP 1 Location: Left arm, BP Patient Position: At rest)  Pulse (!) 48  Temp 98.3 °F (36.8 °C)  Resp 18  Ht 5' 3\" (1.6 m)  Wt 97.5 kg (215 lb)  SpO2 96%  BMI 38.09 kg/m2 Current Shift:  10/02 0701 - 10/02 1900 In: 1320 [P.O.:1320] Out: - Last three shifts:  09/30 1901 - 10/02 0700 In: 5 [P.O.:220] Out: 800 [Urine:800] Exam: 
 
General: nad Head/Neck: mmm CVS:s1s2 Lungs:Ctab/l Abdomen: Soft, bs+ Extremities: No edema. Labs: Results:  
   
Chemistry Recent Labs 10/02/18 
 0220  09/30/18 
 1000 GLU  97  227* NA  142  140  
K  4.8  4.9 CL  108  107 CO2  28  23 BUN  17  23* CREA  1.33*  1.63* CA  8.8  8.9 AGAP  6  10 BUCR  13  14 AP   --   47  
TP   --   7.6 ALB   --   3.5 GLOB   --   4.1* AGRAT   --   0.9 CBC w/Diff Recent Labs 10/02/18 
 0220  10/01/18 
 0943  09/30/18 1530  09/30/18 
 1000 WBC  6.2   --    --   8.7  
RBC  3.83*   --    --   3.59* HGB  7.7*  7.8*  5.5*  6.1*  
HCT  26.1*  26.7*  19.5*  22.6*  
PLT  273   --    --   346 GRANS   --    --    --   81* LYMPH   --    --    --   14* EOS   --    --    --   1 Cardiac Enzymes Recent Labs  
   09/30/18 
 1000 CPK  37 CKND1  CALCULATION NOT PERFORMED WHEN RESULT IS BELOW LINEAR LIMIT Coagulation No results for input(s): PTP, INR, APTT in the last 72 hours. No lab exists for component: INREXT Lipid Panel Lab Results Component Value Date/Time Cholesterol, total 175 09/30/2018 10:00 AM  
 HDL Cholesterol 81 (H) 09/30/2018 10:00 AM  
 LDL, calculated 70 09/30/2018 10:00 AM  
 VLDL, calculated 24 09/30/2018 10:00 AM  
 Triglyceride 120 09/30/2018 10:00 AM  
 CHOL/HDL Ratio 2.2 09/30/2018 10:00 AM  
  
BNP No results for input(s): BNPP in the last 72 hours. Liver Enzymes Recent Labs  
   09/30/18 
 1000 TP  7.6 ALB  3.5 AP  47 SGOT  9* Thyroid Studies No results found for: T4, T3U, TSH, TSHEXT Procedures/imaging: see electronic medical records for all procedures/Xrays and details which were not copied into this note but were reviewed prior to creation of Plan Lee Lewis MD

## 2018-10-02 NOTE — PROGRESS NOTES
Problem: Mobility Impaired (Adult and Pediatric) Goal: *Acute Goals and Plan of Care (Insert Text) Physical Therapy Goals Initiated 10/1/2018 and to be accomplished within 7 day(s) 1. Patient will move from supine <> sit with S in prep for out of bed activity and change of position. 2.  Patient will perform sit<> stand with S/RW in prep for transfers/ambulation. 3.  Patient will transfer from bed <> chair with S/RW for time up in chair for completion of ADL activity. 4.  Patient will ambulate 100 feet with S/RW without LOB/path deviatons for improved functional mobility/qait/safe discharge. Outcome: Progressing Towards Goal 
physical Therapy TREATMENT Patient: Ciera Mendez (9 y.o. female) Date: 10/2/2018 Diagnosis: Stroke [facility]) Syncope Precautions: Fall, Aspiration, Skin Chart, physical therapy assessment, plan of care and goals were reviewed. ASSESSMENT: 
Pt is able to slightly increase amb with RW, but she limits output, due to C/o fatigue. Exercises were well tolerated. Pt requests second session. Will F/u later as pt's schedule permits. Progression toward goals: 
[]      Improving appropriately and progressing toward goals [x]      Improving slowly and progressing toward goals 
[]      Not making progress toward goals and plan of care will be adjusted PLAN: 
Patient continues to benefit from skilled intervention to address the above impairments. Continue treatment per established plan of care. Discharge Recommendations:  Jarrett Roberts Further Equipment Recommendations for Discharge:  portable oxygen and rolling walker SUBJECTIVE:  
Patient stated I'm not getting up right not.  OBJECTIVE DATA SUMMARY:  
Critical Behavior: 
Neurologic State: Alert Orientation Level: Oriented to person, Oriented to place, Disoriented to time Cognition: Follows commands Safety/Judgement: Decreased insight into deficits, Decreased awareness of need for safety, Decreased awareness of need for assistance Functional Mobility Training: 
Bed Mobility: 
Rolling: Stand-by assistance Supine to Sit: Stand-by assistance Scooting: Contact guard assistance Transfers: 
Sit to Stand: Contact guard assistance Stand to Sit: Contact guard assistance Bed to Chair: Minimum assistance Balance: 
Sitting: Intact Standing: Impaired; With support Standing - Static: Good Standing - Dynamic : Fair Ambulation/Gait Training: 
Distance (ft): 50 Feet (ft) Assistive Device: Gait belt;Walker, rolling Ambulation - Level of Assistance: Minimal assistance Gait Abnormalities: Decreased step clearance; Step to gait Base of Support: Narrowed Speed/Peggy: Slow Step Length: Left shortened;Right shortened Swing Pattern: Right asymmetrical;Left asymmetrical 
Interventions: Safety awareness training; Tactile cues; Verbal cues; Visual/Demos Therapeutic Exercises:  
 
 
EXERCISE Sets Reps Active Active Assist  
Passive Self ROM Comments Ankle Pumps 1 30  [] [] [] [] Quad Sets/Glut Sets 1 10 [] [] [] [] Hamstring Sets 1 10 [] [] [] [] Short Arc Quads 1 10 [] [] [] [] Heel Slides   [] [] [] [] Straight Leg Raises   [] [] [] [] Hip Abd/Add 1 20 [] [] [] [] Long Arc Quads 1 20 [] [] [] [] Seated Marching 1 20 [] [] [] []   
Standing Marching   [] [] [] []   
   [] [] [] []   
 
 
Pain: 
Pain Scale 1: Numeric (0 - 10) Pain Intensity 1: 0 Activity Tolerance:  
Fair+ Please refer to the flowsheet for vital signs taken during this treatment. After treatment:  
[] Patient left in no apparent distress sitting up in chair 
[x] Patient left in no apparent distress in bed 
[x] Call bell left within reach [x] Nursing notified 
[] Caregiver present 
[] Bed alarm activated Doug Segura PTA Time Calculation: 35 mins

## 2018-10-02 NOTE — PROGRESS NOTES
Verbal bedside received from Gustavo 44 off going nurse. Assumed patient care, bed in low position, call light within reach, white board updated. inrformed patient the need to keep arm straight, to minimize the beeping.  
 
2200 Patient complained of IV beeping, refused nurse to start another IV. IV infusion stopped per order. No other issues per patient and family.

## 2018-10-03 NOTE — PERIOP NOTES
Report given to EDWINA Carrillo via phone.  Pt in stable condition, and transferred back to room via transport team.

## 2018-10-03 NOTE — PROGRESS NOTES
Shift Progress Note: Assumed care of patient in bed drinking go-lytly. Finished prep with clear stool in commode. Uneventful night, VSS, patient now NPO, call bell within reach. Patient Vitals for the past 12 hrs: 
 Temp Pulse Resp BP SpO2  
10/03/18 0333 98.3 °F (36.8 °C) 95 16 110/52 100 % 10/02/18 2339 98.2 °F (36.8 °C) 94 15 108/51 100 % 10/02/18 2000 98.3 °F (36.8 °C) (!) 107 16 108/62 100 %

## 2018-10-03 NOTE — ROUTINE PROCESS
Bedside and Verbal shift change report given to EMILY REYNOSO [facility] (oncoming nurse) by Ruben Morrow RN 
 (offgoing nurse). Report included the following information SBAR, Kardex, Intake/Output, MAR and Recent Results.

## 2018-10-03 NOTE — PROGRESS NOTES
Hospitalist Progress Note Patient: Leonor Cruz MRN: [id number]  CSN: [id number] YOB: 1932  Age: 80 y.o. Sex: female DOA: 9/30/2018 LOS:  LOS: 3 days Chief Complaint: 
Severe anemia Assessment/Plan Severe anemia Syncope or possible acute CVA present upon admission Colon mass discovered today on scope-ascending distal colon-this is cancer NIDDM 
HTN 
COPD on home 02 Sarcoidosis Discussed with daughter at length the results of colonoscopy and that we will ask the surgeon to see her and determine risks and benefits of procedure for tumor Daughter understands, questions answered Return to [address] Continue daily ASA Avoid other NSAIDs Repeat CBCand BMP in am 
Start iron tablets Hold on d/c until we see what surgery recommends Total time: 40 min Counseling / coordination time:  
> 50% counseling / coordination Disposition : 
Patient Active Problem List  
Diagnosis Code  Dyspnea R06.00  Troponin level elevated R74.8  Wheezing R06.2  Sarcoidosis D86.9  Diabetes ([facility] E11.9  Hypoxia R09.02  Stroke ([facility] I63.9  Syncope R55  Chronic respiratory failure with hypoxia, on home O2 therapy (HCC) J96.11, Z99.81  
 HTN (hypertension) I10  Type II diabetes mellitus, uncontrolled ([facility] E11.65  Severe anemia D64.9 Subjective: 
 
Denies abd pain No CP or SOB Just hungry! Review of systems: 
 
Constitutional: denies fevers, chills, myalgias Respiratory: denies SOB Cardiovascular: denies chest pain, palpitations Gastrointestinal: denies nausea, vomiting, diarrhea Vital signs/Intake and Output: 
Visit Vitals  /60  Pulse 88  Temp 98.1 °F (36.7 °C)  Resp 16  
 Ht 5' 3\" (1.6 m)  Wt 97.5 kg (215 lb)  SpO2 98%  BMI 38.09 kg/m2 Current Shift:  10/03 0701 - 10/03 1900 In: 100 [I.V.:100] Out: - Last three shifts:  10/01 1901 - 10/03 0700 In: 1320 [P.O.:1320] Out: - Exam: General: elderly BF alert, NAD, OX3 
CVS:Regular rate and rhythm, no M/R/G, S1/S2 heard, no thrill Lungs:Clear to auscultation bilaterally, no wheezes, rhonchi, or rales Abdomen: Soft, Nontender, No distention, Normal Bowel sounds, No hepatomegaly Extremities: No C/C/E, pulses palpable 2+ Skin:normal texture and turgor, no rashes, no lesions Neuro:grossly normal , follows commands Psych:appropriate Labs: Results:  
   
Chemistry Recent Labs 10/02/18 
 0220 GLU  97 NA  142  
K  4.8  
CL  108 CO2  28 BUN  17 CREA  1.33* CA  8.8 AGAP  6  
BUCR  13  
  
CBC w/Diff Recent Labs 10/02/18 
 0220  10/01/18 
 3310  09/30/18 215 [facility] WBC  6.2   --    --   
RBC  3.83*   --    --   
HGB  7.7*  7.8*  5.5* HCT  26.1*  26.7*  19.5* PLT  273   --    --   
  
Cardiac Enzymes No results for input(s): CPK, CKND1, YESENIA in the last 72 hours. No lab exists for component: Hezzie Chick Coagulation No results for input(s): PTP, INR, APTT in the last 72 hours. No lab exists for component: INREXT Lipid Panel Lab Results Component Value Date/Time Cholesterol, total 175 09/30/2018 10:00 AM  
 HDL Cholesterol 81 (H) 09/30/2018 10:00 AM  
 LDL, calculated 70 09/30/2018 10:00 AM  
 VLDL, calculated 24 09/30/2018 10:00 AM  
 Triglyceride 120 09/30/2018 10:00 AM  
 CHOL/HDL Ratio 2.2 09/30/2018 10:00 AM  
  
BNP No results for input(s): BNPP in the last 72 hours. Liver Enzymes No results for input(s): TP, ALB, TBIL, AP, SGOT, GPT in the last 72 hours. No lab exists for component: DBIL Thyroid Studies No results found for: T4, T3U, TSH, TSHEXT Procedures/imaging: see electronic medical records for all procedures/Xrays and details which were not copied into this note but were reviewed prior to creation of Plan Mike Clayton MD

## 2018-10-03 NOTE — PROGRESS NOTES
Transition of care: acute care versus rehab Met with patient and her daughter at bedside. Informed her that PT is recommending rehab instead of acute rehab. Patients daughter states she will choose [address] [facility],[address] [phone]. CMS will place referral. if not able to go to [facility] at [address] [place]. Telephone call with [facility] She informed cm that they had accepted her but since she is not ready to come today will have colonoscopy she will review patients case tomorrow and cm to call semaj tomorrow to see if they can still accept. patient and family are aware. Cm will continue to follow. Care Management Interventions PCP Verified by CM: Yes Mode of Transport at Discharge: BLS Transition of Care Consult (CM Consult):  (acute rehab) Physical Therapy Consult: Yes Occupational Therapy Consult: Yes Current Support Network: Other Confirm Follow Up Transport: Family Plan discussed with Pt/Family/Caregiver: Yes Freedom of Choice Offered: Yes Discharge Location Discharge Placement: Rehab Unit Subacute versus rehab

## 2018-10-03 NOTE — PROCEDURES
[facility] 
Colonoscopy Procedure Report 
_______________________________________________________ Patient: Mandeep Rosario                                         Attending Physician: Darnell Sanchez MD 
 
Patient ID: [id number]                                      Referring Physician: Lena Murphy MD 
 
Exam Date: October 3, 2018 _______________________________________________________ Introduction: A  80 y.o. female patient, presents for inpatient Colonoscopy Indications: severe iron deficiency anemia 5.5. Never had colonoscopy Consent: The benefits, risks, and alternatives to the procedure were discussed and informed consent was obtained from the patient. Preparation: EKG, pulse, pulse oximetry and blood pressure were monitored throughout the procedure. ASA Classification: Class 2 - . The heart is an S1-S2 and regular heart rate and rhythm. Lungs are clear to auscultation and percussion. Abdomen is soft, nondistended, and nontender. Mental Status: awake, alert, and oriented to person, place, and time Medications: 
· Fentanyl 50 mcg IV before procedure. · Versed 5 mg IV throughout the procedure. Rectal Exam: Normal Rectal Exam. No Blood. Pathology Specimens: three specimens removed. Procedure: The colonoscope was passed with difficulty through the anus under direct visualization and advanced to the mid ascending colon. The patient required positioning on the back and external counter pressure to aid in the passage of the scope in the ascending colon because of the redundancy of the colon and looping but it did not help advance the scope into the cecum even in the prone position. The scope was withdrawn and the mucosa was carefully examined. The quality of the preparation was good to fair. The views were very good. The patient's toleration of the procedure was good. The exam was done twice to the ascending colon. Total time is 46 minutes. Findings: 
 
Rectum: Normal 
Sigmoid:  
Long and redundant sigmoid colon. Moderate diverticulosis of the sigmoid, the descending and the transverse colon Descending Colon: Moderate diverticulosis of the descending colon. 4 small sessile polyps 4 to 6 mm in the descending colon, all hot snared Transverse Colon: Moderate diverticulosis of the transverse colon. 5 small sessile polyps 4 to 7 mm in the mid and the distal transverse colon hot snared Ascending Colon:  
Infiltrative friable, ulcerated and stenotic mid or distal ascending colon cancer occupying 75% of the total circumference allowing barely the passage of the colonoscope and extending over 5 cm length wise. Biopsies taken. Few small polyps at the hepatic flexure not touched Cecum:  
Not intubated but appeared normal from far away Terminal Ileum: Not seen Unplanned Events: There were no unplanned events. Estimated Blood Loss: Minimal 
Impressions:   
Long and redundant sigmoid colon. Moderate diverticulosis of the sigmoid, the descending and the transverse colon  Normal Mucosa. Infiltrative friable, ulcerated and stenotic mid or distal ascending colon cancer occupying 75% of the total circumference of the colon, allowing barely the passage of the colonoscope and extending over 5 cm length wise. Biopsies taken. Few small polyps at the hepatic flexure not touched. 4 small sessile polyps 4 to 6 mm in the descending colon, all hot snared. 5 small sessile polyps 4 to 7 mm in the mid and the distal transverse colon hot snared Complications: None; patient tolerated the procedure well. Recommendations: 
· Consult with Dr Marvin Head colorectal surgeon · Resume full liquid diet. · Colonoscopy recommendation in 1 year · Do CT scan abdomen and pelvis without IV contrast because of the CKD · All first degree family members should have screening colonoscopies starting at age 36 Procedure Codes:   
· COLONOSCOPY,BIOPSY [[id number]] · Gypsy List [[id number]] · COLONOSCPY,FLEX,W/ [facility] [[id number]] Endoscope Information: Model Number(s) [id number] Assistant: None Signed By: Faizan Miller MD Date: October 3, 2018

## 2018-10-03 NOTE — CONSULTS
Jarrett Fernandez 
MR#: [id number] : 1932 ACCOUNT #: [de-identified] DATE OF SERVICE: 10/01/2018 HISTORY OF PRESENT ILLNESS:  An 41-year-old female who was admitted on 2018 because of weakness and syncopal episode. The patient was found to have severe anemia at 5.5 with low MCV and MCH, also low ferritin and iron saturation at 5 and 3 respectively. She has been already taking pantoprazole 40 mg for a few years because of chronic GERD but in the last 2 months has been taking Celebrex twice daily for osteoarthritis. Since that time, also she has been complaining of diarrhea where she has 5-6 loose bowel movements per day, sometimes with fecal incontinence, however, no rectal bleeding or melena. She was complaining also of lower abdominal cramps. She does have sometimes some nausea, but there is no vomiting, no dysphagia. She lost a couple of pounds. She has no family history of colon cancer. She does not smoke or drink alcohol. No prior history of liver disease. She has been diabetic for many years with apparently good control. She also has COPD, used to smoke but quit 40 years ago. She has no coronary disease, but did have a couple of strokes, the last one being about 3 years ago. She has been taking baby aspirin, although this does not appear on her list of medication. She was treating her heartburn breakthroughs with Zantac. She claimed that many years ago she was told to have ulcers, but she did not have an endoscopy. She claims also that she never had any colonoscopy. PAST MEDICAL HISTORY:  Remarkable for the diabetes mellitus, sarcoidosis, COPD, hypoxemia, on oxygen at home, hypertension, type 2 diabetes, paroxysmal atrial fibrillation. She has osteoarthritis, chronic GERD. PAST SURGICAL HISTORY:  Previous open cholecystectomy. SOCIAL HISTORY:  She is living with her daughter. She ambulates with a walker. ALLERGIES:  NO KNOWN DRUG ALLERGIES. MEDICATIONS AT HOME:  We have Protonix 40 mg, Glucophage 500 mg with breakfast, Glucotrol 5 mg b.i.d., Celebrex 200 mg b.i.d., Proventil, Atrovent, Nasonex, Dimetapp with pseudoephedrine, Singulair, Zocor 20 mg, Zantac 150 mg, Lasix 20 mg. FAMILY HISTORY:  No colorectal cancer. This patient used to have a regular bowel movement every day, but for some reason in the last couple of months she has been having more frequent and loose bowel movements with incontinence. She has some dementia issue with forgetfulness according to her grandson. She does have shortness of breath on exertion. She is limited in her activities to walking around the house with the help of a walker. She has no chest pain. She has been feeling weak for the last 6 weeks but had a syncopal episode the day of her admission but did not hurt herself or fall. PHYSICAL EXAMINATION: 
GENERAL:  We have an 60-year-old [facility] American female who appears to be in no distress. She is not a very good historian. She has some memory problems. VITAL SIGNS:  Weighs 215 pounds. Temperature 98.4, pulse 93-83, blood pressure 93/59 and 110/50, breathing 18, saturation 100% with a nasal cannula at 2 liters/minute. EYES:  Remarkable for pale conjunctivae. The sclerae are anicteric. The pupils are equal and reactive to light. OROPHARYNGEAL CAVITY:  She has pale and moist mucous membrane. She has dentures. NECK:  Supple. No palpable mass. LUNGS:  Remarkable for decreased air entry with some rhonchi. CARDIAC:  Rhythm is regular. S1, S2 normal.  No murmur. ABDOMEN:  Rounded, obese, soft, nontender. No mass or organomegaly. There is a long oblique right upper quadrant surgical scar. EXTREMITIES:  Unremarkable. No pedal edema, no clubbing, no tremor. NEUROLOGIC:  No focal neurological signs. She is alert and oriented.  
 
LABORATORY DATA:  We have a hemoglobin on arrival of 5.5; it increased to 7.8 after 2 blood transfusions. Low MCV and MCH. Normal WBC, platelet. She had 81%, lymphocytes. Normal coagulation. Urinalysis normal.  Complete metabolic panel: We have a BUN of 23, creatinine 1.64 when they were 27 and 1.24 on 06/11. Normal electrolytes. Glucose 227. Normal LFT. Albumin is 3.5. Normal lipid profile, CPK, CPK-MB, troponin. Vitamin B12 is 270, normal folate, iron saturation 3%. IMAGING DATA:  CT of the chest on 02/21/2015 spoke about pulmonary arterial hypertension, severe mediastinal and hilar lymphadenopathies. At this time, a chest x-ray:  Hypoventilatory changes and bibasilar volume losses, no acute process. There are bilateral hilar prominences in keeping with adenopathy seen on the CT scan in 02/2015. IN CONCLUSION:  This is an 49-year-old female who comes with severe iron deficiency anemia. In the last couple of months she has been taking Celebrex twice daily and has been complaining of diarrhea with some lower abdominal cramps. In my opinion, until proven otherwise, she must have a colonic cancer that needs to be investigated with a colonoscopy. She does have chronic GERD for many years that has been treated with Protonix 40 mg daily. If the colonoscopy is negative, then we could consider checking her stomach. This patient should stop taking the Celebrex or any NSAIDs in the future. She does have diabetes mellitus with some acute on chronic kidney failure. She also has hypertension, history of stroke, and with some mild dementia. She is obese. Her vitamin B12 is on the borderline and I recommend that she does receive some oral supplementation on a regular basis. The family agreed that we proceed with colonoscopy. We will allow her, for a couple of days, to have a good bowel preparation. Further note to follow. MARQUIS Londono MD MBE / OLESYA 
D: 10/03/2018 11:32    
T: 10/03/2018 12:52 JOB #: [id number] CC: Shireen Liriano MD

## 2018-10-03 NOTE — PROGRESS NOTES
Problem: Neurolinguistics Impaired (Adult) Goal: *Acute Goals and Plan of Care (Insert Text) Cognitive-linguistic goals:  
Patient will: 1. Complete functional working memory tasks, utilizing compensatory internal and external techniques with 80% Adriana Patee 2. Recall basic auditory information with 80% Adriana Patee 3. Complete deductive reasoning/logic tasks with 80% Adriana Patee 4. Complete compare/contrast activities with 80% acc. [facility] 5. Complete visuospatial tasks with 80% acc. [address] [facility] Outcome: Progressing Towards Goal 
Speech language pathology treatment Patient: Ayla Tee (74 y.o. female) Date: 10/3/2018 Diagnosis: Stroke ([facility] [address] 
anemia  Syncope Procedure(s) (LRB): 
COLONOSCOPY (N/A) Precautions: Aspiration PLOF: Independent ASSESSMENT: 
Followed up this day with cognitive ST treatment. Patient A&Ox3, daughter at bedside. Areas of deficit include visuospatial/executive functioning, abstraction and delayed recall. Patient appears unaware of errors and deficits. Introduced external and internal memory strategies, handout provided. Delayed recall task completed with 45% acc. utilizing strategies with maxA. Patient completed compare/contrast tasks with 50% acc, maxA. Patient would benefit from continued ST upon D/C from current facility to address cognitive deficits. Patient NPO for colonoscopy, dysphagia tx not completed this day. Progression toward goals: 
[]       Improving appropriately and progressing toward goals [x]       Improving slowly and progressing toward goals 
[]       Not making progress toward goals and plan of care will be adjusted PLAN: 
Patient continues to benefit from skilled intervention to address the above impairments. Continue treatment per established plan of care. Discharge Recommendations:  Rehab, Inpatient Rehab and Outpatient SUBJECTIVE:  
Patient stated Sometimes I forget to take my medication at night. OBJECTIVE:  
Mental Status: Neurologic State: Alert, Appropriate for age Orientation Level: Oriented to person, Oriented to place, Oriented to situation, Disoriented to time Cognition: Follows commands, Recognition of people/places Perception: Tactile, Verbal, Visual 
Perseveration: Perseverates during conversation Safety/Judgement: Decreased insight into deficits, Decreased awareness of need for safety, Decreased awareness of need for assistance Treatment & Interventions: 
  
  
Language Comprehension and Expression: 
  
Verbal Expression Response & Tolerance to Activities: 
 Good PAIN: 
Start of Tx: 0 End of Tx: 0 After treatment:  
[]       Patient left in no apparent distress sitting up in chair 
[x]       Patient left in no apparent distress in bed 
[x]       Call bell left within reach  
[]       Nursing notified 
[x]       Caregiver present 
[]       Bed alarm activated COMMUNICATION/EDUCATION:  
[x]             Compensatory speech/language/comprehension techniques Sanaz Fletcher SLP Time Calculation: 25 mins

## 2018-10-03 NOTE — PROGRESS NOTES
Problem: Self Care Deficits Care Plan (Adult) Goal: *Acute Goals and Plan of Care (Insert Text) Initial OT STGs (10/1/2018) Within 7 days: 1. Patient will perform toilet transfer with Supervision in preparation for bowel and bladder management. 2. Patient will perform bowel and bladder management with setup for increased independence with ADLs. 3. Patient will perform UB dressing with setup while utilizing mer-techniques for increased independence with ADLs. 4. Patient will perform LB dressing with setup while utilizing mer-techniques for increased independence with ADLs. 5. Patient will visually attend to L side of environment with 1 verbal cues in preparation for ADLs. 6. Patient will perform UE exercises with minimal assist for 15 minutes to increase independence with ADLs. 7. Patient will utilize energy conservation techniques with 1 verbal cue(s) for increased independence with ADLs. 8. Patient will perform self-feeding, including opening containers w/ setup/Kirsten for increased independence with ADLs. Occupational Therapy Treatment Attempt Chart reviewed. Attempted Occupational Therapy Treatment, however, patient unable to be seen due to: 
[]  Nausea/vomiting 
[]  Eating 
[]  Pain 
[]  Patient too lethargic [x]  Off Unit for testing/procedure/Endo/?colonoscopy 
[]  Dialysis treatment in progress 
[]  Telemetry Results []  Other:  
 
Will f/u later as patient's schedule allows.   
Thank you for this referral. 
Milla Velez, OTR/L

## 2018-10-03 NOTE — PROGRESS NOTES
Problem: Mobility Impaired (Adult and Pediatric) Goal: *Acute Goals and Plan of Care (Insert Text) Physical Therapy Goals Initiated 10/1/2018 and to be accomplished within 7 day(s) 1. Patient will move from supine <> sit with S in prep for out of bed activity and change of position. 2.  Patient will perform sit<> stand with S/RW in prep for transfers/ambulation. 3.  Patient will transfer from bed <> chair with S/RW for time up in chair for completion of ADL activity. 4.  Patient will ambulate 100 feet with S/RW without LOB/path deviatons for improved functional mobility/qait/safe discharge. Outcome: Progressing Towards Goal 
physical Therapy TREATMENT Patient: Aaron Perez (86 y.o. female) Date: 10/3/2018 Diagnosis: Stroke ([facility] [address] 
anemia  Syncope Procedure(s) (LRB): 
COLONOSCOPY, POLYPECTOMY, BIOPSY, ENDOSCOPIC TATTOO (N/A) Day of Surgery Precautions: Fall, Aspiration, Skin Chart, physical therapy assessment, plan of care and goals were reviewed. ASSESSMENT: 
Pt is able to increase distance slighly and needs fewer cues to maintain safety on this session. instrution provided to initiate standing rest breaks (x 3). Progression toward goals: 
[]      Improving appropriately and progressing toward goals 
[]      Improving slowly and progressing toward goals 
[]      Not making progress toward goals and plan of care will be adjusted PLAN: 
Patient continues to benefit from skilled intervention to address the above impairments. Continue treatment per established plan of care. Discharge Recommendations:  TBD Further Equipment Recommendations for Discharge:  Rolling walker SUBJECTIVE:  
Patient stated I'm a little better, but my stomach can still feel it.  OBJECTIVE DATA SUMMARY:  
Critical Behavior: 
Neurologic State: Alert, Appropriate for age Orientation Level: Oriented X4 Cognition: Appropriate decision making, Appropriate for age attention/concentration, Appropriate safety awareness, Follows commands Safety/Judgement: Decreased insight into deficits, Decreased awareness of need for safety, Decreased awareness of need for assistance Functional Mobility Training: 
Transfers: 
Sit to Stand: Contact guard assistance Stand to Sit: Contact guard assistance Stand Pivot Transfers: Supervision Balance: 
Sitting: Intact Standing: Intact; With support Standing - Static: Fair Standing - Dynamic : Fair Ambulation/Gait Training: 
Distance (ft): 100 Feet (ft) Assistive Device: Gait belt;Walker, rolling Ambulation - Level of Assistance: Contact guard assistance Gait Abnormalities: Decreased step clearance; Step to gait Base of Support: Widened Speed/Peggy: Slow Step Length: Right shortened;Left shortened Therapeutic Exercises:  
Reviewed Pain: 
Pain Scale 1: Numeric (0 - 10) Pain Intensity 1: 0 Activity Tolerance:  
Good Please refer to the flowsheet for vital signs taken during this treatment. After treatment:  
[] Patient left in no apparent distress sitting up in chair 
[x] Patient left in no apparent distress in bed 
[x] Call bell left within reach [x] Nursing notified 
[] Caregiver present 
[] Bed alarm activated Liliya Singh PTA Time Calculation: 35 mins

## 2018-10-03 NOTE — PROGRESS NOTES
Request to consult for new colon cancer diagnosis. Presented with GI Bleed and colonoscopy reveals right sided mass CT scan for staging ordered as well as CEA. Full consult to follow tomorrow. Will need cardiac clearance for likely surgery on Monday if all data collected allows for it. Pt may have diet in the meantime. Chris Fleming,  
10/3/2018

## 2018-10-04 PROBLEM — D86.0 SARCOIDOSIS OF LUNG (HCC): Status: ACTIVE | Noted: 2018-01-01

## 2018-10-04 PROBLEM — K63.89 COLONIC MASS: Status: ACTIVE | Noted: 2018-01-01

## 2018-10-04 NOTE — PROGRESS NOTES
Hospitalist Progress Note-critical care note Patient: Khurram Rivas MRN: [id number]  CSN: [id number] YOB: 1932  Age: 80 y.o. Sex: female DOA: 9/30/2018 LOS:  LOS: 4 days Chief complaint: sarcoidosis of lung. Severe anemia, colon mass ,stroke , Dm Assessment/Plan Hospital Problems  Date Reviewed: 9/30/2018 Codes Class Noted POA Sarcoidosis of lung ([facility].) ICD-10-CM: D86.0 ICD-9-CM: 135, 517.8  10/4/2018 Unknown Colonic mass ICD-10-CM: K63.9 ICD-9-CM: 569.89  10/4/2018 Unknown Stroke [facility]) ICD-10-CM: I63.9 ICD-9-CM: 434.91  9/30/2018 Unknown * (Principal)Syncope ICD-10-CM: R55 
ICD-9-CM: 780.2  9/30/2018 Unknown Chronic respiratory failure with hypoxia, on home O2 therapy (HCC) ICD-10-CM: J96.11, Z99.81 ICD-9-CM: 518.83, 799.02, V46.2  9/30/2018 Unknown HTN (hypertension) ICD-10-CM: I10 
ICD-9-CM: 401.9  9/30/2018 Unknown Type II diabetes mellitus, uncontrolled ([facility] 75.) ICD-10-CM: E11.65 ICD-9-CM: 250.02  9/30/2018 Unknown Severe anemia ICD-10-CM: D64.9 ICD-9-CM: 285.9  9/30/2018 Unknown Hypoxia ICD-10-CM: R09.02 
ICD-9-CM: 799.02  2/22/2015 Yes Sarcoidosis ICD-10-CM: D86.9 ICD-9-CM: 135  2/21/2015 Yes Severe anemia 
 received two units prbc Will transfuse before surgery Gi colonoscopy done with colon mass Colon mass Suspected colon cancer, ct abdomen having enlarged lymphanode-suspected metastatic disease Case discussed with dr. Palomares-pulmonology for peroperative clearance also reported cough with blood Cardiology was on board for clearance Syncope or possible acute CVA present upon admission vs severe anemia  
[place] brain possible acute infarct NIDDM Continue ssi COPD on home 02 Continue breathing tx and nc O2 Sarcoidosis see above  
  
Daughter was at the bedside. All questions have been answered.  35 total min's spent on patient care including >50% on counseling/coordinating care. Discussed the above assessments. also discussed labs, medications and hospital course Disposition :tbd,  
 
Subjective: feel ok with the results, let do what we suppose to do Review of systems: 
 
General: No fevers or chills. Cardiovascular: No chest pain or pressure. No palpitations. Pulmonary: No shortness of breath. Gastrointestinal: No nausea, vomiting. Vital signs/Intake and Output: 
Visit Vitals  /69 (BP 1 Location: Right arm, BP Patient Position: At rest;Sitting)  Pulse 90  Temp 98 °F (36.7 °C)  Resp 18  Ht 5' 3\" (1.6 m)  Wt 92.4 kg (203 lb 9.6 oz)  SpO2 100%  BMI 36.07 kg/m2 Current Shift:  10/04 0701 - 10/04 1900 In: 330 [P.O.:330] Out: - Last three shifts:  10/02 1901 - 10/04 0700 In: 300 [P.O.:200; I.V.:100] Out: 0 Physical Exam: 
General: WD, WN. Alert, cooperative, no acute distress   
HEENT: NC, Atraumatic. PERRLA, anicteric sclerae. Lungs: CTA Bilaterally. No Wheezing/Rhonchi/Rales. Heart:  Regular  Rhythm, + murmur, No Rubs, No Gallops Abdomen: Soft, Non distended, Non tender.  +Bowel sounds, Extremities: No c/c/e Psych:   Not anxious or agitated. Neurologic:  No acute neurological deficit. Labs: Results:  
   
Chemistry Recent Labs 10/04/18 
 5150  10/02/18 
 0174 GLU  98  97 NA  140  142  
K  4.1  4.8  
CL  106  108 CO2  28  28 BUN  8  17 CREA  1.15  1.33* CA  8.8  8.8 AGAP  6  6 BUCR  7*  13  
  
CBC w/Diff Recent Labs 10/04/18 
 1415  10/02/18 
 7995 WBC  7.1  6.2 RBC  3.93*  3.83* HGB  7.9*  7.7* HCT  27.3*  26.1*  
PLT  257  273 Cardiac Enzymes Recent Labs 10/04/18 
 1507 CPK  58 CKND1  CALCULATION NOT PERFORMED WHEN RESULT IS BELOW LINEAR LIMIT Coagulation No results for input(s): PTP, INR, APTT in the last 72 hours. No lab exists for component: INREXT, INREXT Lipid Panel Lab Results Component Value Date/Time Cholesterol, total 175 09/30/2018 10:00 AM  
 HDL Cholesterol 81 (H) 09/30/2018 10:00 AM  
 LDL, calculated 70 09/30/2018 10:00 AM  
 VLDL, calculated 24 09/30/2018 10:00 AM  
 Triglyceride 120 09/30/2018 10:00 AM  
 CHOL/HDL Ratio 2.2 09/30/2018 10:00 AM  
  
BNP No results for input(s): BNPP in the last 72 hours. Liver Enzymes No results for input(s): TP, ALB, TBIL, AP, SGOT, GPT in the last 72 hours. No lab exists for component: DBIL Thyroid Studies No results found for: T4, T3U, TSH, TSHEXT, TSHEXT Procedures/imaging: see electronic medical records for all procedures/Xrays and details which were not copied into this note but were reviewed prior to creation of Plan Stephany Noel MD

## 2018-10-04 NOTE — ROUTINE PROCESS
Bedside and Verbal shift change report given to Remy Simpson (oncoming nurse) by Marina Lanza RN 
 (offgoing nurse). Report included the following information SBAR, Kardex, Intake/Output, MAR and Recent Results.

## 2018-10-04 NOTE — CONSULTS
[facility] Pulmonary, Critical Care, and Sleep Medicine Name: Aaron Perez MRN: [id number] : 1932 Hospital: [facility] [place] Date: 10/4/2018 PCCM Note Consult requesting physician: Dr. Lauren Guy  
Reason for Consult: hemoptysis, pulmonary sarcoidosis IMPRESSION:  
· Colon mass, likely cancer (syncope and anemia leading to further evaluation and diagnosis of colon mass) · Hemoptysis, very mild blood tinged sputum couple times since last couple days. · Hx of suspected Sarcoidosis (mediastinal/hilar lymphadenopathy but no parenchymal disease on CT . Restrictive ventilatory defect and reduced DLCO onPFT ), never had tissue diagnosis. 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  
· HTN 
· DM 
· AFib · Anemia · Obesity RECOMMENDATIONS:  
Pt never had definitive diagnosis of sarcoidosis. Sarcoidosis vs lymphoma suspected when she had mediastinal and hilar lymphadenopathy in 2015 CT. She has extensive past smoking hx but no obstructive defect on PFT . She is on albuterol neb and HFA prn which she is not needing to use. On 3 lit/min O2 at home. Mild blood tinged sputum couple of times since couple of days. No clinical hx suggestive of PE. CT chest ordered to r/o any pulmonary mets with now diagnosis of likely colon cancer. C/w O2 
C/w bronchodilator prn Hold ASA if hemoptysis worsening. FiO2 to keep SpO2 >=92%, HOB >=30 degree, aspiration precautions, aggressive pulmonary toileting, incentive spirometry, PT/OT eval and treat, mobilization. DVT Prophylaxis: protonix GI Prophylaxis: SCD Other issues management by primary team and respective consultants.  
Further recommendations will be based on the patient's response to recommended treatment and results of the investigation ordered. Quality Care: PPI, DVT prophylaxis, HOB elevated, infection control all reviewed and addressed. Discussed with patient and family - daughter at bedside, radiologic work up showed, answered all questions to their satisfaction. Care plan discussed with nursing, Dr. Sarah Haynes. 
  
 
 
Subjective/History:  
 
Theodore Smith is a 80 y.o. female with PMHx significant for HTN, DM, AFib, obesity, former smoker, ? COPD, restrictive ventilatory defect and reduced DLCO on PFT 2015, chronic hypoxic respiratory failure on home O2, mediastinal and hilar lymphadenopathy in 2015 with suspected sarcoidosis vs lymphoma who never had tissue diagnosis, admitted with syncope/anemia and found to have colon mass on w/u likely colon cancer. Planned colon surgery likely on Monday Mild couple times blood tinged sputum since couple of days, no fresh or profuse bleeding. No cp leg edema calf pain No hx of VTE. No FMHx of VTE or hypercoagulable state. No sob at rest but YIN with walking without associated wheezing or cough. No epistaxis or other site bleeding. Review of Systems: A comprehensive review of systems was negative except for that written in the HPI. Review of Systems:  
HEENT: No epistaxis, no nasal drainage, no difficulty in swallowing, no redness in eyes Respiratory: as above Cardiovascular: no chest pain, no palpitations, no chronic leg edema, no syncope Gastrointestinal: no abd pain, no vomiting, no diarrhea Genitourinary: No urinary symptoms or hematuria Integument/breast: No ulcers or rashes Musculoskeletal:Neg 
Neurological: No focal weakness, no seizures, no headaches Behvioral/Psych: No anxiety, no depression Constitutional: No fever, no chills, no weight loss, no night sweats Immunization status:  
Immunization History Administered Date(s) Administered  Pneumococcal Vaccine (Unspecified Type) 01/01/2013 No Known Allergies Past Medical History:  
Diagnosis Date  Acid reflux  Arthritis  Atrial fibrillation ([facility]  [facility] Sarcoidosis  Diabetes ([facility]  High cholesterol  Hypertension  Sarcoidosis of lung ([facility]  Stroke ([facility] Past Surgical History:  
Procedure Laterality Date  COLONOSCOPY N/A 10/3/2018 COLONOSCOPY, POLYPECTOMY, BIOPSY, ENDOSCOPIC TATTOO performed by Gavin Matthews MD at [address] [place] [address] History reviewed. No pertinent family history. Social History Substance Use Topics  Smoking status: Former Smoker Packs/day: 2.50 Years: 30.00 Types: Cigarettes  Smokeless tobacco: Never Used  Alcohol use No  
  
 
Prior to Admission medications Medication Sig Start Date End Date Taking? Authorizing Provider  
simvastatin (ZOCOR) 20 mg tablet Take 20 mg by mouth nightly. Yes Historical Provider  
montelukast (SINGULAIR) 10 mg tablet Take 10 mg by mouth daily. Yes Historical Provider Brompheniramine-Pseudoeph-DM (DIMETAPP) 2-30-10 mg/5 mL syrup  6/15/16  Yes Historical Provider  
ranitidine (ZANTAC) 150 mg tablet Take 150 mg by mouth two (2) times a day. Yes Historical Provider  
furosemide (LASIX) 20 mg tablet Take  by mouth daily. Yes Historical Provider  
mometasone (NASONEX) 50 mcg/actuation nasal spray 2 Sprays daily. Yes Historical Provider  
ipratropium (ATROVENT) 0.02 % nebulizer solution 2.5 mL by Nebulization route four (4) times daily. 120 vials 8/10/16  Yes Silvano Kee MD  
albuterol (PROVENTIL VENTOLIN) 2.5 mg /3 mL (0.083 %) nebulizer solution 3 mL by Nebulization route four (4) times daily. 8/10/16  Yes Silvano Kee MD  
albuterol (PROVENTIL HFA, VENTOLIN HFA, PROAIR HFA) 90 mcg/actuation inhaler Take 1-2 Puffs by inhalation every six (6) hours as needed for Wheezing.  8/10/16  Yes Silvano Kee MD  
 celecoxib (CELEBREX) 200 mg capsule  2/3/15  Yes Historical Provider  
glipiZIDE (GLUCOTROL) 5 mg tablet Take 5 mg by mouth two (2) times a day. Yes Historical Provider  
metFORMIN (GLUCOPHAGE) 500 mg tablet Take 500 mg by mouth daily (with breakfast). Yes Historical Provider  
pantoprazole (PROTONIX) 40 mg tablet Take 40 mg by mouth daily. Yes Misael Rivers, MD  
 
 
Current Facility-Administered Medications Medication Dose Route Frequency  metoclopramide HCl (REGLAN) injection 5 mg  5 mg IntraVENous Q6H PRN  pantoprazole (PROTONIX) tablet 40 mg  40 mg Oral ACB  polyethylene glycol (MIRALAX) packet 17 g  17 g Oral BID  albuterol (PROVENTIL HFA, VENTOLIN HFA, PROAIR HFA) inhaler 1-2 Puff  1-2 Puff Inhalation Q6H PRN  
 sodium chloride (NS) flush 5-10 mL  5-10 mL IntraVENous Q8H  
 sodium chloride (NS) flush 5-10 mL  5-10 mL IntraVENous PRN  
 aspirin (ASPIRIN) tablet 325 mg  325 mg Oral DAILY  insulin lispro (HUMALOG) injection   SubCUTAneous AC&HS  
 glucose chewable tablet 16 g  4 Tab Oral PRN  
 glucagon (GLUCAGEN) injection 1 mg  1 mg IntraMUSCular PRN  
 dextrose (D50W) injection syrg 12.5-25 g  25-50 mL IntraVENous PRN  
 atorvastatin (LIPITOR) tablet 80 mg  80 mg Oral DAILY  0.9% sodium chloride infusion 250 mL  250 mL IntraVENous PRN Objective:  
Vital Signs:   
Visit Vitals  /55 (BP 1 Location: Right arm, BP Patient Position: At rest;Supine)  Pulse 91  Temp 98.1 °F (36.7 °C)  Resp 18  Ht 5' 3\" (1.6 m)  Wt 92.4 kg (203 lb 9.6 oz)  SpO2 94%  BMI 36.07 kg/m2 O2 Device: Nasal cannula O2 Flow Rate (L/min): 2 l/min Temp (24hrs), Av °F (36.7 °C), Min:97.2 °F (36.2 °C), Max:98.8 °F (37.1 °C) Intake/Output:  
Last shift:      10/04 0701 - 10/04 1900 In: 240 [P.O.:240] Out: - Last 3 shifts: 10/02 1901 - 10/04 0700 In: 300 [P.O.:200; I.V.:100] Out: 0 Intake/Output Summary (Last 24 hours) at 10/04/18 1025 Last data filed at 10/04/18 2398 Gross per 24 hour Intake              540 ml Output                0 ml Net              540 ml Physical Exam:  
 
General/Neurology: Alert, Awake, NAD. Obese. O2 via NC. Head:   Normocephalic, without obvious abnormality, atraumatic. Eye:   EOM intact, no scleral icterus, no pallor, no cyanosis. Throat:  Lips, mucosa, and tongue normal. No oral thrush. Neck:   Supple, symmetric. No carotid bruit. No lymphadenopathy. Trachea midline Lung: Moderate air entry bilateral equal. No rales. No rhonchi. No wheezing. No stridors. No prolongded expiration. No accessory muscle use. Heart:   Regular rate & rhythm. S1 S2 present. No murmur. No JVD. Abdomen:  Soft. NT. ND. Extremities:  No pedal edema. No cyanosis. No clubbing. Pulses: 2+ and symmetric in DP. Capillary refill: normal 
Lymphatic:  No cervical or supraclavicular palpable lymphadenopathy. Data:  
   
Recent Results (from the past 24 hour(s)) GLUCOSE, POC Collection Time: 10/03/18 12:04 PM  
Result Value Ref Range Glucose (POC) 128 (H) 70 - 110 mg/dL GLUCOSE, POC Collection Time: 10/03/18  4:32 PM  
Result Value Ref Range Glucose (POC) 125 (H) 70 - 110 mg/dL GLUCOSE, POC Collection Time: 10/03/18 10:31 PM  
Result Value Ref Range Glucose (POC) 130 (H) 70 - 110 mg/dL METABOLIC PANEL, BASIC Collection Time: 10/04/18  3:26 AM  
Result Value Ref Range Sodium 140 136 - 145 mmol/L Potassium 4.1 3.5 - 5.5 mmol/L Chloride 106 100 - 108 mmol/L  
 CO2 28 21 - 32 mmol/L Anion gap 6 3.0 - 18 mmol/L Glucose 98 74 - 99 mg/dL BUN 8 7.0 - 18 MG/DL Creatinine 1.15 0.6 - 1.3 MG/DL  
 BUN/Creatinine ratio 7 (L) 12 - 20 GFR est AA 54 (L) >60 ml/min/1.73m2 GFR est non-AA 45 (L) >60 ml/min/1.73m2 Calcium 8.8 8.5 - 10.1 MG/DL  
CBC W/O DIFF Collection Time: 10/04/18  3:26 AM  
Result Value Ref Range WBC 7.1 4.6 - 13.2 K/uL  
 RBC 3.93 (L) 4.20 - 5.30 M/uL HGB 7.9 (L) 12.0 - 16.0 g/dL HCT 27.3 (L) 35.0 - 45.0 % MCV 69.5 (L) 74.0 - 97.0 FL  
 MCH 20.1 (L) 24.0 - 34.0 PG  
 MCHC 28.9 (L) 31.0 - 37.0 g/dL RDW 26.1 (H) 11.6 - 14.5 % PLATELET 770 257 - 565 K/uL MPV 8.5 (L) 9.2 - 11.8 FL  
GLUCOSE, POC Collection Time: 10/04/18  6:26 AM  
Result Value Ref Range Glucose (POC) 134 (H) 70 - 110 mg/dL Chemistry Recent Labs 10/04/18 
 6333  10/02/18 
 7267 GLU  98  97 NA  140  142  
K  4.1  4.8  
CL  106  108 CO2  28  28 BUN  8  17 CREA  1.15  1.33* CA  8.8  8.8 AGAP  6  6 BUCR  7*  13 Lactic Acid No results found for: LAC No results for input(s): LAC in the last 72 hours. Liver Enzymes Protein, total  
Date Value Ref Range Status 09/30/2018 7.6 6.4 - 8.2 g/dL Final  
 
Albumin Date Value Ref Range Status 09/30/2018 3.5 3.4 - 5.0 g/dL Final  
 
Globulin Date Value Ref Range Status 09/30/2018 4.1 (H) 2.0 - 4.0 g/dL Final  
 
A-G Ratio Date Value Ref Range Status 09/30/2018 0.9 0.8 - 1.7   Final  
 
AST (SGOT) Date Value Ref Range Status 09/30/2018 9 (L) 15 - 37 U/L Final  
 
Alk. phosphatase Date Value Ref Range Status 09/30/2018 47 45 - 117 U/L Final  
 
No results for input(s): TP, ALB, GLOB, AGRAT, SGOT, GPT, AP, TBIL in the last 72 hours. No lab exists for component: DBIL  
 
CBC w/Diff Recent Labs 10/04/18 
 5724  10/02/18 
 8224 WBC  7.1  6.2 RBC  3.93*  3.83* HGB  7.9*  7.7* HCT  27.3*  26.1*  
PLT  257  273 Cardiac Enzymes No results found for: CPK, CK, CKMMB, CKMB, RCK3, CKMBT, CKNDX, CKND1, YESENIA, TROPT, TROIQ, RENY, TROPT, TNIPOC, BNP, BNPP  
 
BNP No results found for: BNP, BNPP, XBNPT Coagulation No results for input(s): PTP, INR, APTT in the last 72 hours. No lab exists for component: INREXT Thyroid  No results found for: T4, T3U, TSH, TSHEXT No results found for: T4  
 
Urinalysis Lab Results Component Value Date/Time Color YELLOW 10/01/2018 01:50 AM  
 Appearance CLEAR 10/01/2018 01:50 AM  
 Specific gravity 1.017 10/01/2018 01:50 AM  
 pH (UA) 5.0 10/01/2018 01:50 AM  
 Protein NEGATIVE  10/01/2018 01:50 AM  
 Glucose NEGATIVE  10/01/2018 01:50 AM  
 Ketone NEGATIVE  10/01/2018 01:50 AM  
 Bilirubin NEGATIVE  10/01/2018 01:50 AM  
 Urobilinogen 0.2 10/01/2018 01:50 AM  
 Nitrites NEGATIVE  10/01/2018 01:50 AM  
 Leukocyte Esterase NEGATIVE  10/01/2018 01:50 AM  
 Epithelial cells FEW 10/01/2018 01:50 AM  
 Bacteria FEW (A) 10/01/2018 01:50 AM  
 WBC 0 to 2 10/01/2018 01:50 AM  
 RBC NEGATIVE  10/01/2018 01:50 AM  
  
 
Micro  No results for input(s): SDES, CULT in the last 72 hours. No results for input(s): CULT in the last 72 hours. ABG No results for input(s): PHI, PHI, POC2, PCO2I, PO2, PO2I, HCO3, HCO3I, FIO2, FIO2I in the last 72 hours. PFT 4/14/15 PFTs 4/14/15 Effort good FLOWS: 
FEV1/FVC ratio is preserved      
Maximal Mid Expiratory Flow is decreased FEV1 and FVC are decreased     
Pre bronchodilator FEV1 is 0.77 L (51% predicted) Pre bronchodilator FVC is 0.90 L (46% predicted)    
 
Volumes: 
TLC, RV and VC are decreased TLC is 55% predicted    
 
Flow Volume Loop: 
Nonspecific restrictive pattern in Flow Volume Loop    
 
Bronchodilator: No bronchodilator improvement seen 
   
Diffusion: 
Diffusion Capacity - decreased at 11% predicted Airway resistance: Normal 
 
Impression: 
Predominantly moderate restrictive defect seen. An additional airflow obstructive defect cannot be ruled out. There was no bronchodilator response.   
Severe decrease in uncorrected diffusion capacity  - could be due 
to anemia and/or pulmonary parenchymal disease and/or pulmonary 
vascular disease.  The decreased diffusion capacity is out of 
proportion to the decreased flows and lung volumes; hence 
recommend evaluation for pulmonary hypertension and interstitial 
lung disease. Please correlate clinically. Absence of bronchodilation on study does not exclude 
bronchodilator therapy.   
 
 
Quita Trinidad MD  
 
Ultrasound LE Doppler ECHO 9/30/18 · Estimated left ventricular ejection fraction is 66 - 70%. Left ventricular mild concentric hypertrophy. Normal left ventricular wall motion, no regional wall motion abnormality noted. Mild (grade 1) left ventricular diastolic dysfunction E/E' ratio is 12. Junius El · Mitral valve non-specific thickening. Mild mitral valve regurgitation. · Mild tricuspid valve regurgitation is present. Pulmonary arterial systolic pressure is 35 mmHg. Mild pulmonary hypertension is present. · Mild pulmonic valve regurgitation is present. · Saline contrast was given to evaluate for intracardiac shunt. Right to left shunt seen at rest.  
 
 
CT chest 2/21/15: 
1. There is no pulmonary embolism or aortic dissection. 2. Pulmonary arterial hypertension which may be due to pulmonary artery 
compression from the substantial and severe mediastinal and hilar 
lymphadenopathy. These lymph nodes are very amenable to endobronchial ultrasound 
biopsy. Differential considerations would include sarcoidosis (the favored 
diagnosis), however lymphoma must also be considered. This lymphadenopathy may 
also be compressing some of the airways leading to the atelectasis seen in the 
lung bases. CT (Most Recent) (CT chest reviewed by me) Results from Hospital Encounter encounter on 09/30/18 CTA HEAD NECK W CONT Narrative Brain CT angiogram and Neck CT angiogram: 
 
Indication: Evaluate for stenosis. Possible infarction. Evaluate for source of 
embolus. Left-sided weakness. History of infarction. Procedure:  
 
Neck CT angiogram: Contrast was administered with a power injector. Axial thin 
section volume acquisition  scans were obtained timed for peak arterial 
enhancement. An automated triggering system was used. Axial images were generated. Angiographic coronal and sagittal reformations were also generated. Extensive 3-D separate workstation processing was performed by [employer]. Brain CT angiogram: Dedicated slab MIP overlapping angiographic reconstructions 
in the sagittal, axial and coronal plane of the brain component of the axial 
evaluation were also performed. Extensive 3-D separate workstation processing 
was performed by [employer]. One or more dose reduction techniques were used on this CT: automated exposure 
control, adjustment of the mAs and/or kVp according to patient size, and 
iterative reconstruction techniques. The specific techniques used on this CT 
exam have been documented in the patient's electronic medical record. Comparison exam: None. Findings: Neck CTA: 
 
Any internal carotid stenosis described below uses NASCET criteria, minimal 
residual lumen diameter versus the non-tapering cervical internal carotid 
artery. Aortic Arch: Normal branching pattern. No proximal great vessel stenosis. Substantial tortuosity proximal great vessels but no stenosis. Left carotid: No stenosis or other vascular abnormality. Tuberosity of both the 
common and internal carotid with medial tortuosity approaching kissing of the 
carotid bifurcation retropharyngeal and loops of the internal carotid artery. Right carotid:  No stenosis or other vascular abnormality. Tuberosity of both the 
common and internal carotid with medial tortuosity approaching kissing of the 
carotid bifurcation retropharyngeal and loops of the internal carotid artery. The vertebral arteries are mildly left dominant. Right vertebral artery:  No stenosis or other vascular abnormality. Left vertebral artery:  No stenosis or other vascular abnormality. Lung apices:  Apical bulla on the right. No suspicious mass. Somewhat numerous 
not particularly enlarged right paratracheal and precarinal lymph nodes.  The 
 largest precarinal lymph node measures 21 x 16 mm, mildly enlarged. Neck soft tissues: Medial retropharyngeal tortuosity of both carotid arteries 
with tortuous cervical internal carotid arteries. No neck soft tissue 
abnormality. Brain CTA: 
 
Carotid siphon and supraclinoid internal carotid artery: No significant 
stenosis. M1 segment and proximal M2 segment MCA:  No significant stenosis or aneurysm. A1 segment, anterior communicating artery and proximal A2 segments:  No 
significant stenosis or aneurysm. Vertebrobasilar system:  Fetal origin left PCA. Distal anterior cerebral artery:  No significant stenosis or aneurysm. Distal MCA M2/M3 segment:  Areas of focal stenosis and/or more likely filling 
defects consistent with residual embolic material are noted in distal right MCA 
branches, superior division branches, distal M3 and M4 segments in the sylvian 
fissure, definitive on MIP angiographic images. No other significant vascular abnormality. Brain parenchyma on source data:  No significant parenchymal brain abnormality. Impression Impression: 1. No hemodynamically significant cervical vascular stenosis. 2. Focal areas of stenosis and/or more likely residual embolic material distal 
right MCA branches. 3. No large vessel occlusion. 4. No other significant intracranial abnormality is appreciated. XR (Most Recent). CXR  reviewed by me and compared with previous CXR Results from Hospital Encounter encounter on 09/30/18 XR CHEST PORT Narrative EXAM: XR CHEST PORT 
 
CLINICAL INDICATION/HISTORY: CVA >Additional: None COMPARISON: Chest x-ray most recently 6/14/2015 >Reference exam: Chest CT from 2/21/2015 TECHNIQUE: Portable chest. 
 
_______________ FINDINGS: 
 
SUPPORT LINES AND TUBES: None. HEART AND MEDIASTINUM: The heart is partially obscured due to the low lung 
volumes but grossly stable in size and contour.  Bilateral hilar prominence in 
keeping with adenopathy seen to better advantage on prior CT from February 2015. LUNGS AND PLEURAL SPACES: The lungs remain underexpanded with basilar volume 
loss. Band of subsegmental atelectasis in the left perihilar region is noted. BONY THORAX AND SOFT TISSUES: The bones and soft tissues are within normal 
limits. _______________ Impression IMPRESSION: 
 
1. Hypoventilatory changes and bibasilar volume loss, similar to the prior 
examination. No acute cardiopulmonary abnormality or significant interval 
change. MRI brain 9/30/18: 
1. Possible tiny area of acute right parietal infarction, evident on diffusion 
only without hemorrhage or mass effect. 2. Moderately prominent ischemic white matter change. Ischemic changes in the 
thalami. 3. Chronic left frontal parietal and small right frontal cortical/subcortical 
infarctions. Numerous chronic bilateral cerebellar hemisphere infarctions. Gloria Rehman MD 
10/4/2018

## 2018-10-04 NOTE — PROGRESS NOTES
Problem: Mobility Impaired (Adult and Pediatric) Goal: *Acute Goals and Plan of Care (Insert Text) Physical Therapy Goals Initiated 10/1/2018 and to be accomplished within 7 day(s) 1. Patient will move from supine <> sit with S in prep for out of bed activity and change of position. 2.  Patient will perform sit<> stand with S/RW in prep for transfers/ambulation. 3.  Patient will transfer from bed <> chair with S/RW for time up in chair for completion of ADL activity. 4.  Patient will ambulate 100 feet with S/RW without LOB/path deviatons for improved functional mobility/qait/safe discharge. Outcome: Progressing Towards Goal 
physical Therapy TREATMENT Patient: Nino Foreman (87 y.o. female) Date: 10/4/2018 Diagnosis: Stroke ([facility] 
anemia  Syncope Procedure(s) (LRB): 
COLONOSCOPY, POLYPECTOMY, BIOPSY, ENDOSCOPIC TATTOO (N/A) 1 Day Post-Op Precautions: Fall, Aspiration, Skin Chart, physical therapy assessment, plan of care and goals were reviewed. ASSESSMENT: 
Pt is able to again increase ambulation distance, but safety is still limited by dementia and short attention span. Pt request cues for UE placement and postural erectness. moderate fatigue observed post treatment. Pt left in recliner, with family at side. Progression toward goals: 
[]      Improving appropriately and progressing toward goals [x]      Improving slowly and progressing toward goals 
[]      Not making progress toward goals and plan of care will be adjusted PLAN: 
Patient continues to benefit from skilled intervention to address the above impairments. Continue treatment per established plan of care. Discharge Recommendations:  TBD Further Equipment Recommendations for Discharge:  bedside commode and rolling walker SUBJECTIVE:  
Patient stated I've been waiting on you.  OBJECTIVE DATA SUMMARY:  
Critical Behavior: 
Neurologic State: Alert Orientation Level: Oriented X4 
 Cognition: Follows commands Safety/Judgement: Decreased insight into deficits, Decreased awareness of need for safety, Decreased awareness of need for assistance Functional Mobility Training: 
Bed Mobility: 
Rolling: Supervision Supine to Sit: Supervision Sit to Supine: Supervision Transfers: 
Sit to Stand: Contact guard assistance;Minimum assistance Stand to Sit: Contact guard assistance;Minimum assistance Stand Pivot Transfers: Supervision Balance: 
Sitting: Intact Standing: Intact; With support Standing - Static: Fair Standing - Dynamic : Fair Ambulation/Gait Training: 
Distance (ft): 150 Feet (ft) Assistive Device: Gait belt;Walker, rolling Ambulation - Level of Assistance: Contact guard assistance Gait Abnormalities: Decreased step clearance; Step to gait Base of Support: Widened Speed/Peggy: Slow Step Length: Right shortened;Left shortened Pain: 
Pain in: 0 Pain out: 0 Activity Tolerance:  
Fair Please refer to the flowsheet for vital signs taken during this treatment. After treatment:  
[x] Patient left in no apparent distress sitting up in chair 
[] Patient left in no apparent distress in bed 
[x] Call bell left within reach [x] Nursing notified 
[x] Caregiver present 
[] Bed alarm activated Alaina David PTA Time Calculation: 25 mins

## 2018-10-04 NOTE — ADVANCED PRACTICE NURSE
Placed patient on portable oxygen at 1 LPM via NC. She is not presently wearing her oxygen and declines applying it. The wall oxygen system needs to be temporarily ceased for repairs.

## 2018-10-04 NOTE — ROUTINE PROCESS
Bedside and Verbal shift change report given to Irma Mcdaniel RN (oncoming nurse) by Jo Brownlee RN (offgoing nurse). Report included the following information SBAR, Kardex, Procedure Summary, Intake/Output, MAR, Accordion, Recent Results and Med Rec Status.

## 2018-10-04 NOTE — PROGRESS NOTES
Transition of care: TBD Met with edward at bedside she state she has to have surgery see below for re of GI MD findings Impressions:   
Long and redundant sigmoid colon. Moderate diverticulosis of the sigmoid, the descending and the transverse colon  Normal Mucosa. Infiltrative friable, ulcerated and stenotic mid or distal ascending colon cancer occupying 75% of the total circumference of the colon, allowing barely the passage of the colonoscope and extending over 5 cm length wise. Biopsies taken. Few small polyps at the hepatic flexure not touched. 4 small sessile polyps 4 to 6 mm in the descending colon, all hot snared. 5 small sessile polyps 4 to 7 mm in the mid and the distal transverse colon hot snared Patient states she has to have a cardiologist that has to see here and she may need to have a surgery probably on Monday. Cm will continue to follow. Care Management Interventions PCP Verified by CM: Yes Mode of Transport at Discharge: BLS Transition of Care Consult (CM Consult):  (acute rehab) Physical Therapy Consult: Yes Occupational Therapy Consult: Yes Current Support Network: Other Confirm Follow Up Transport: Family Plan discussed with Pt/Family/Caregiver: Yes Freedom of Choice Offered: Yes Discharge Location Discharge Placement: Rehab Unit Subacute

## 2018-10-04 NOTE — PROGRESS NOTES
Problem: Pressure Injury - Risk of 
Goal: *Prevention of pressure injury Document Tavares Scale and appropriate interventions in the flowsheet. Outcome: Progressing Towards Goal 
Pressure Injury Interventions: 
Sensory Interventions: Assess changes in LOC Moisture Interventions: Absorbent underpads Activity Interventions: Pressure redistribution bed/mattress(bed type) Mobility Interventions: Pressure redistribution bed/mattress (bed type) Nutrition Interventions: Document food/fluid/supplement intake Friction and Shear Interventions: HOB 30 degrees or less, Trapeze to reposition Problem: Falls - Risk of 
Goal: *Absence of Falls Document [facility] Fall Risk and appropriate interventions in the flowsheet. Outcome: Progressing Towards Goal 
Fall Risk Interventions: 
Mobility Interventions: Patient to call before getting OOB Mentation Interventions: Bed/chair exit alarm, Door open when patient unattended Medication Interventions: Patient to call before getting OOB Elimination Interventions: Call light in reach History of Falls Interventions: Door open when patient unattended, Bed/chair exit alarm

## 2018-10-04 NOTE — PROGRESS NOTES
0730 Assumed responsibility for patient from Bijan Esquivel, [address] [facility],  [address] Patient back in bed. Complaints of coughing up blood. Will notify Dr. Angel Luis Sagastume. Informed patient and daughter Dr. Angel Luis Sagastume would be hospitalist today and was not sure exact time she would round. Advised she would likely be in before 11 am 
 
1120 Patient off floor to CT. Bedside shift change report given to Bijan Esquivel RN (oncoming nurse) by Kali Cano RN (offgoing nurse). Report included the following information SBAR, Kardex and MAR.

## 2018-10-04 NOTE — PROGRESS NOTES
Shift Progress Note: Assumed care of patient in bed awake and quiet, no complaints of pain or discomfort. VSS, call bell remains within reach. Uneventful night. Daughter @ bedside. Patient Vitals for the past 12 hrs: 
 Temp Pulse Resp BP SpO2  
10/04/18 0307 97.8 °F (36.6 °C) (!) 108 18 124/57 97 % 10/03/18 2304 98.8 °F (37.1 °C) 96 18 98/50 100 % 10/03/18 1941 98.1 °F (36.7 °C) 96 20 111/51 100 %

## 2018-10-05 NOTE — PROGRESS NOTES
Speech Therapy Note: 
 
Could not progress with ST treatment because patient :  
 
[ ] was unresponsive [ ] deferred due to:  
[X] was off the unit for stress test 
[ ] on hold 
[ ] NPO for procedure SLP will re-attempt treatment later this day or as schedule permits. Rory Sandhoff, M.S., CF-SLP Speech Language Pathologist

## 2018-10-05 NOTE — PROGRESS NOTES
[facility] Pulmonary, Critical Care, and Sleep Medicine Name: Doris Mariscal MRN: [id number] : 1932 Hospital: [facility] [place] Date: 10/5/2018 PCCM Note Consult requesting physician: Dr. Araseli Machuca  
Reason for Consult: hemoptysis, pulmonary sarcoidosis IMPRESSION:  
· Colon mass, likely cancer (syncope and anemia leading to further evaluation and diagnosis of colon mass) · Hemoptysis, very mild blood tinged sputum couple times since last couple days. Resolved. CT chest unremarkable for any causes for hemoptysis. · Hx of suspected Sarcoidosis (mediastinal/hilar lymphadenopathy but no parenchymal disease on CT . Restrictive ventilatory defect and reduced DLCO onPFT ), never had tissue diagnosis. Repeat CT 10/4/18 showed reduction in mediastinal/hilar lymphadenopathy and bibasilar scarring. 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  
· HTN 
· DM 
· AFib · Anemia · Obesity RECOMMENDATIONS:  
CT chest reviewed: showed reduction in mediastinal and hilar lymphadenopathy. Bibasilar scarring. No upper lobe parenchymal scarring/fibrosis which is typical of sarcoidosis. No endobronchial lesions/mass. Mild blood tinged sputum resolved now. She has extensive past smoking hx but no obstructive defect on PFT . She is on albuterol neb and HFA prn which she is not needing to use. On 3 lit/min O2 at home. C/w O2 
C/w bronchodilator prn Hold ASA if hemoptysis worsening. For stress test today for syncope and preoperative cardiac eval.  
OK to proceed to colon mass surgery from pulmonary standpoint. FiO2 to keep SpO2 >=92%, HOB >=30 degree, aspiration precautions, aggressive pulmonary toileting, incentive spirometry, PT/OT eval and treat, mobilization. DVT Prophylaxis: protonix GI Prophylaxis: SCD Other issues management by primary team and respective consultants. Further recommendations will be based on the patient's response to recommended treatment and results of the investigation ordered. Quality Care: PPI, DVT prophylaxis, HOB elevated, infection control all reviewed and addressed. Discussed with patient and family - daughter at bedside, radiologic work up showed, answered all questions to their satisfaction. Care plan discussed with nursing, Dr. Tenisha Pham. 
  
 
 
Subjective/History:  
 
Chanel Stockton is a 80 y.o. female with PMHx significant for HTN, DM, AFib, obesity, former smoker, ? COPD, restrictive ventilatory defect and reduced DLCO on PFT 2015, chronic hypoxic respiratory failure on home O2, mediastinal and hilar lymphadenopathy in 2015 with suspected sarcoidosis vs lymphoma who never had tissue diagnosis, admitted with syncope/anemia and found to have colon mass on w/u likely colon cancer. 10/5/18: For nuclear stress test today No fever chills dyspnea No more blood tinged sputum No overnight issues Immunization status:  
Immunization History Administered Date(s) Administered  Pneumococcal Vaccine (Unspecified Type) 01/01/2013 No Known Allergies Past Medical History:  
Diagnosis Date  Acid reflux  Arthritis  Atrial fibrillation ([address]  Autoimmune disease ([address] Sarcoidosis  Diabetes ([facility] [address]  High cholesterol  Hypertension  Sarcoidosis of lung ([facility] [address]  Stroke ([facility] [address] [facility] Past Surgical History:  
Procedure Laterality Date  COLONOSCOPY N/A 10/3/2018 COLONOSCOPY, POLYPECTOMY, BIOPSY, ENDOSCOPIC TATTOO performed by Jo Rubi MD at [place] [id number] History reviewed. No pertinent family history. Social History Substance Use Topics  Smoking status: Former Smoker Packs/day: 2.50 Years: 30.00 Types: Cigarettes  Smokeless tobacco: Never Used  Alcohol use No  
  
 
Prior to Admission medications Medication Sig Start Date End Date Taking? Authorizing Provider  
simvastatin (ZOCOR) 20 mg tablet Take 20 mg by mouth nightly. Yes Historical Provider  
montelukast (SINGULAIR) 10 mg tablet Take 10 mg by mouth daily. Yes Historical Provider Brompheniramine-Pseudoeph-DM (DIMETAPP) 2-30-10 mg/5 mL syrup  6/15/16  Yes Historical Provider  
ranitidine (ZANTAC) 150 mg tablet Take 150 mg by mouth two (2) times a day. Yes Historical Provider  
furosemide (LASIX) 20 mg tablet Take  by mouth daily. Yes Historical Provider  
mometasone (NASONEX) 50 mcg/actuation nasal spray 2 Sprays daily. Yes Historical Provider  
ipratropium (ATROVENT) 0.02 % nebulizer solution 2.5 mL by Nebulization route four (4) times daily. 120 vials 8/10/16  Yes Ashley Castro MD  
albuterol (PROVENTIL VENTOLIN) 2.5 mg /3 mL (0.083 %) nebulizer solution 3 mL by Nebulization route four (4) times daily. 8/10/16  Yes Ashley Castro MD  
albuterol (PROVENTIL HFA, VENTOLIN HFA, PROAIR HFA) 90 mcg/actuation inhaler Take 1-2 Puffs by inhalation every six (6) hours as needed for Wheezing. 8/10/16  Yes Ashley Castro MD  
celecoxib (CELEBREX) 200 mg capsule  2/3/15  Yes Historical Provider  
glipiZIDE (GLUCOTROL) 5 mg tablet Take 5 mg by mouth two (2) times a day. Yes Historical Provider  
metFORMIN (GLUCOPHAGE) 500 mg tablet Take 500 mg by mouth daily (with breakfast). Yes Historical Provider  
pantoprazole (PROTONIX) 40 mg tablet Take 40 mg by mouth daily. Yes Misael Rivers MD  
 
 
Current Facility-Administered Medications Medication Dose Route Frequency  simvastatin (ZOCOR) tablet 20 mg  20 mg Oral QHS  fluticasone (FLONASE) 50 mcg/actuation nasal spray 2 Spray  2 Spray Both Nostrils DAILY  ipratropium (ATROVENT) 0.02 % nebulizer solution 0.5 mg  0.5 mg Nebulization QID RT  
 metoclopramide HCl (REGLAN) injection 5 mg  5 mg IntraVENous Q6H PRN  
  pantoprazole (PROTONIX) tablet 40 mg  40 mg Oral ACB  polyethylene glycol (MIRALAX) packet 17 g  17 g Oral BID  albuterol (PROVENTIL HFA, VENTOLIN HFA, PROAIR HFA) inhaler 1-2 Puff  1-2 Puff Inhalation Q6H PRN  
 sodium chloride (NS) flush 5-10 mL  5-10 mL IntraVENous Q8H  
 sodium chloride (NS) flush 5-10 mL  5-10 mL IntraVENous PRN  
 aspirin (ASPIRIN) tablet 325 mg  325 mg Oral DAILY  insulin lispro (HUMALOG) injection   SubCUTAneous AC&HS  
 glucose chewable tablet 16 g  4 Tab Oral PRN  
 glucagon (GLUCAGEN) injection 1 mg  1 mg IntraMUSCular PRN  
 dextrose (D50W) injection syrg 12.5-25 g  25-50 mL IntraVENous PRN  
 atorvastatin (LIPITOR) tablet 80 mg  80 mg Oral DAILY  0.9% sodium chloride infusion 250 mL  250 mL IntraVENous PRN Objective:  
Vital Signs:   
Visit Vitals  /73  Pulse 75  Temp 98.2 °F (36.8 °C)  Resp 17  Ht 5' 3\" (1.6 m)  Wt 93.4 kg (206 lb)  SpO2 99%  BMI 36.49 kg/m2 O2 Device: Nasal cannula O2 Flow Rate (L/min): 2 l/min Temp (24hrs), Av.2 °F (36.8 °C), Min:97.9 °F (36.6 °C), Max:98.7 °F (37.1 °C) Intake/Output:  
Last shift:        
Last 3 shifts: 10/03 1901 - 10/05 0700 In: 200 [P.O.:770] Out: 0 Intake/Output Summary (Last 24 hours) at 10/05/18 1128 Last data filed at 10/05/18 0600 Gross per 24 hour Intake              330 ml Output                0 ml Net              330 ml Physical Exam:  
 
General/Neurology: Alert, Awake, NAD. Obese. O2 via NC. Head:   Normocephalic, without obvious abnormality, atraumatic. Neck:   Supple, symmetric. No carotid bruit. No lymphadenopathy. Trachea midline Lung: Moderate air entry bilateral equal. No rales. No rhonchi. No wheezing. No stridors. No prolongded expiration. No accessory muscle use. Heart:   Regular rate & rhythm. S1 S2 present. No murmur. No JVD. Abdomen:  Soft. NT. ND. Extremities:  No pedal edema. No cyanosis. No clubbing. Pulses: 2+ and symmetric in DP. Capillary refill: normal 
Lymphatic:  No cervical or supraclavicular palpable lymphadenopathy. Data:  
   
Recent Results (from the past 24 hour(s)) GLUCOSE, POC Collection Time: 10/04/18  4:06 PM  
Result Value Ref Range Glucose (POC) 115 (H) 70 - 110 mg/dL GLUCOSE, POC Collection Time: 10/04/18 10:36 PM  
Result Value Ref Range Glucose (POC) 156 (H) 70 - 110 mg/dL GLUCOSE, POC Collection Time: 10/05/18  6:24 AM  
Result Value Ref Range Glucose (POC) 148 (H) 70 - 110 mg/dL NUCLEAR CARDIAC STRESS TEST Collection Time: 10/05/18 10:16 AM  
Result Value Ref Range Target  bpm  
 Baseline HR 88 BPM  
 Baseline  mmHg Stress Base Diastolic BP 73 mmHg Stress Stage 1 Duration :54 min:sec Stress Stage 1  bpm  
 Stress Stage 1 /78 mmHg Recovery Stage 1 Duration 5:57 min:sec Recovery Stage 1  bpm  
 Recovery Stage 1 /79 mmHg Exercise duration time 00:00:54 Stress Base Systolic  mmHg Stress Base Diastolic BP 79 mmHg Post peak  BPM  
 Estimated workload 1.0 METS  
GLUCOSE, POC Collection Time: 10/05/18 11:17 AM  
Result Value Ref Range Glucose (POC) 163 (H) 70 - 110 mg/dL Chemistry Recent Labs 10/04/18 
 7338 GLU  98 NA  140  
K  4.1 CL  106 CO2  28 BUN  8  
CREA  1.15  
CA  8.8 AGAP  6  
BUCR  7* Lactic Acid No results found for: LAC No results for input(s): LAC in the last 72 hours. Liver Enzymes Protein, total  
Date Value Ref Range Status 09/30/2018 7.6 6.4 - 8.2 g/dL Final  
 
Albumin Date Value Ref Range Status 09/30/2018 3.5 3.4 - 5.0 g/dL Final  
 
Globulin Date Value Ref Range Status 09/30/2018 4.1 (H) 2.0 - 4.0 g/dL Final  
 
A-G Ratio Date Value Ref Range Status 09/30/2018 0.9 0.8 - 1.7   Final  
 
AST (SGOT) Date Value Ref Range Status 09/30/2018 9 (L) 15 - 37 U/L Final  
 
Alk. phosphatase Date Value Ref Range Status 09/30/2018 47 45 - 117 U/L Final  
 
No results for input(s): TP, ALB, GLOB, AGRAT, SGOT, GPT, AP, TBIL in the last 72 hours. No lab exists for component: DBIL  
 
CBC w/Diff Recent Labs 10/04/18 
 8754 WBC  7.1 RBC  3.93* HGB  7.9*  
HCT  27.3*  
PLT  257 Cardiac Enzymes No results found for: CPK, CK, CKMMB, CKMB, RCK3, CKMBT, CKNDX, CKND1, YESENIA, TROPT, TROIQ, RENY, TROPT, TNIPOC, BNP, BNPP  
 
BNP No results found for: BNP, BNPP, XBNPT Coagulation No results for input(s): PTP, INR, APTT in the last 72 hours. No lab exists for component: INREXT, INREXT Thyroid  No results found for: T4, T3U, TSH, TSHEXT, TSHEXT No results found for: T4  
 
Urinalysis Lab Results Component Value Date/Time Color YELLOW 10/01/2018 01:50 AM  
 Appearance CLEAR 10/01/2018 01:50 AM  
 Specific gravity 1.017 10/01/2018 01:50 AM  
 pH (UA) 5.0 10/01/2018 01:50 AM  
 Protein NEGATIVE  10/01/2018 01:50 AM  
 Glucose NEGATIVE  10/01/2018 01:50 AM  
 Ketone NEGATIVE  10/01/2018 01:50 AM  
 Bilirubin NEGATIVE  10/01/2018 01:50 AM  
 Urobilinogen 0.2 10/01/2018 01:50 AM  
 Nitrites NEGATIVE  10/01/2018 01:50 AM  
 Leukocyte Esterase NEGATIVE  10/01/2018 01:50 AM  
 Epithelial cells FEW 10/01/2018 01:50 AM  
 Bacteria FEW (A) 10/01/2018 01:50 AM  
 WBC 0 to 2 10/01/2018 01:50 AM  
 RBC NEGATIVE  10/01/2018 01:50 AM  
  
 
Micro  No results for input(s): SDES, CULT in the last 72 hours. No results for input(s): CULT in the last 72 hours. ABG No results for input(s): PHI, PHI, POC2, PCO2I, PO2, PO2I, HCO3, HCO3I, FIO2, FIO2I in the last 72 hours. PFT 4/14/15 PFTs 4/14/15 Effort good FLOWS: 
FEV1/FVC ratio is preserved      
Maximal Mid Expiratory Flow is decreased FEV1 and FVC are decreased     
Pre bronchodilator FEV1 is 0.77 L (51% predicted) Pre bronchodilator FVC is 0.90 L (46% predicted)    
 
Volumes: 
TLC, RV and VC are decreased TLC is 55% predicted    
 
 Flow Volume Loop: 
Nonspecific restrictive pattern in Flow Volume Loop    
 
Bronchodilator: No bronchodilator improvement seen 
   
Diffusion: 
Diffusion Capacity - decreased at 11% predicted Airway resistance: Normal 
 
Impression: 
Predominantly moderate restrictive defect seen. An additional airflow obstructive defect cannot be ruled out. There was no bronchodilator response.   
Severe decrease in uncorrected diffusion capacity  - could be due 
to anemia and/or pulmonary parenchymal disease and/or pulmonary 
vascular disease.  The decreased diffusion capacity is out of 
proportion to the decreased flows and lung volumes; hence 
recommend evaluation for pulmonary hypertension and interstitial 
lung disease. Please correlate clinically. Absence of bronchodilation on study does not exclude 
bronchodilator therapy.   
 
 
Lester Swanson MD  
 
Ultrasound LE Doppler ECHO 9/30/18 · Estimated left ventricular ejection fraction is 66 - 70%. Left ventricular mild concentric hypertrophy. Normal left ventricular wall motion, no regional wall motion abnormality noted. Mild (grade 1) left ventricular diastolic dysfunction E/E' ratio is 12. Jone Cancer · Mitral valve non-specific thickening. Mild mitral valve regurgitation. · Mild tricuspid valve regurgitation is present. Pulmonary arterial systolic pressure is 35 mmHg. Mild pulmonary hypertension is present. · Mild pulmonic valve regurgitation is present. · Saline contrast was given to evaluate for intracardiac shunt. Right to left shunt seen at rest.  
 
 
CT chest 2/21/15: 
1. There is no pulmonary embolism or aortic dissection. 2. Pulmonary arterial hypertension which may be due to pulmonary artery 
compression from the substantial and severe mediastinal and hilar 
lymphadenopathy. These lymph nodes are very amenable to endobronchial ultrasound 
biopsy. Differential considerations would include sarcoidosis (the favored diagnosis), however lymphoma must also be considered. This lymphadenopathy may 
also be compressing some of the airways leading to the atelectasis seen in the 
lung bases. CT (Most Recent) (CT chest reviewed by me) Results from Hospital Encounter encounter on 09/30/18 CT CHEST WO CONT Narrative EXAM: CT chest  
 
INDICATION: Shortness of breath. Sarcoidosis. COMPARISON: CTA thorax 2/21/2015 TECHNIQUE: Axial CT imaging from the thoracic inlet through the diaphragm with 
intravenous contrast. Multiplanar reformats were generated. One or more dose 
reduction techniques were used on this CT: automated exposure control, 
adjustment of the mAs and/or kVp according to patient size, and iterative 
reconstruction techniques. The specific techniques used on this CT exam have 
been documented in the patient's electronic medical record. 
 
_______________ FINDINGS: 
 
LUNGS: Bandlike fibrosis bilateral lower lobes and lingula. Paraseptal emphysema 
right apex. PLEURA: Normal. 
 
AIRWAY: Normal. 
 
MEDIASTINUM: Mild cardiomegaly. No pericardial effusion. Dilated main pulmonary 
artery relative to adjacent ascending aorta compatible with pulmonary 
hypertension. LYMPH NODES: ATS 4R adenopathy measuring about 1.4 cm short axis with prior 
measurement 2.5 cm. ATS 3B adenopathy 1 cm with prior measurement 2 cm. ATS 7 
subcarinal adenopathy 1.1 cm with prior measurement 2.5 cm. These findings 
represent a very significant improvement since prior CT. 
 
OTHER: No acute or aggressive osseous abnormalities identified. _______________ Impression IMPRESSION: 
 
 
1. Bandlike fibrosis bilateral lower lobes and lingula without change. No acute 
pulmonary process. 2. Mild cardiomegaly. Pulmonary hypertension again evident. 3. Significant improvement in mediastinal adenopathy since prior study. No 
definitive hilar adenopathy remaining. XR (Most Recent). CXR  reviewed by me and compared with previous CXR Results from Hospital Encounter encounter on 09/30/18 XR CHEST PORT Narrative EXAM: XR CHEST PORT 
 
CLINICAL INDICATION/HISTORY: CVA >Additional: None COMPARISON: Chest x-ray most recently 6/14/2015 >Reference exam: Chest CT from 2/21/2015 TECHNIQUE: Portable chest. 
 
_______________ FINDINGS: 
 
SUPPORT LINES AND TUBES: None. HEART AND MEDIASTINUM: The heart is partially obscured due to the low lung 
volumes but grossly stable in size and contour. Bilateral hilar prominence in 
keeping with adenopathy seen to better advantage on prior CT from February 2015. LUNGS AND PLEURAL SPACES: The lungs remain underexpanded with basilar volume 
loss. Band of subsegmental atelectasis in the left perihilar region is noted. BONY THORAX AND SOFT TISSUES: The bones and soft tissues are within normal 
limits. _______________ Impression IMPRESSION: 
 
1. Hypoventilatory changes and bibasilar volume loss, similar to the prior 
examination. No acute cardiopulmonary abnormality or significant interval 
change. MRI brain 9/30/18: 
1. Possible tiny area of acute right parietal infarction, evident on diffusion 
only without hemorrhage or mass effect. 2. Moderately prominent ischemic white matter change. Ischemic changes in the 
thalami. 3. Chronic left frontal parietal and small right frontal cortical/subcortical 
infarctions. Numerous chronic bilateral cerebellar hemisphere infarctions. Pat Jarquin MD 
10/5/2018

## 2018-10-05 NOTE — PROGRESS NOTES
Shift Progress Note: 
Assued care of patient in bed awake and quiet no s/s  Of distress or discomfort. No c/o pain Uneventful night. Daughter @ bedside thru the night. Call bell within reach, remains on telemetry, continue to monitor. Patient Vitals for the past 12 hrs: 
 Temp Pulse Resp BP SpO2  
10/05/18 0400 98.2 °F (36.8 °C) 75 17 111/46 100 % 10/05/18 0000 98.2 °F (36.8 °C) 97 17 94/61 96 % 10/04/18 2000 98.7 °F (37.1 °C) 98 18 118/50 98 %

## 2018-10-05 NOTE — PROGRESS NOTES
Problem: Dysphagia (Adult) Goal: *Acute Goals and Plan of Care (Insert Text) Recommendations: 
Diet: Mechanical soft, chopped meat/thin liquid Meds: Per patient preference Aspiration Precautions Oral Care TID Dysphagia goals:   
Patient will: 1. Tolerate PO trials with 0 s/s overt distress in 4/5 trials 2. Utilize compensatory swallow strategies/maneuvers (decrease bite/sip, size/rate, alt. liq/sol) with min cues in 4/5 trials 3. Perform oral-motor/laryngeal exercises to increase oropharyngeal swallow function with min cues 4. Complete an objective swallow study (i.e., MBSS) to assess swallow integrity, r/o aspiration, and determine of safest LRD, min A Cognitive-linguistic goals:  
Patient will: 1. Complete functional working memory tasks, utilizing compensatory internal and external techniques with 80% Raynald Linen 2. Recall basic auditory information with 80% Raynald Linen 3. Complete deductive reasoning/logic tasks with 80% Raynald Linen 4. Complete compare/contrast activities with 80% [facility]. [facility] 5. Complete visuospatial tasks with 80% acc. [facility] Outcome: Progressing Towards Goal 
Speech language pathology speech & dysphagia treatment Patient: Taurus Márquez (13 y.o. female) Date: 10/5/2018 Diagnosis: Stroke ([facility] [address] 
anemia  Syncope Procedure(s) (LRB): 
COLONOSCOPY, POLYPECTOMY, BIOPSY, ENDOSCOPIC TATTOO (N/A) 2 Days Post-Op Precautions: Fall, Aspiration, Skin PLOF: Independent ASSESSMENT: 
Followed up this day with cognitive and dysphagia treatment. Patient A&Ox3, family members present at bedside. Dysphagia tx: Observed self-feeding thin liquid + straw, puree and regular solid trials. Pt exhibited delayed but adequate bolus cohesion, manipulation and A-P transit. Further exhibited adequate swallow timing/reflex and hyolaryngeal excursion. Able to manipulate and clear with 0 clinical s/s aspiration.  Pt safe for mechanical soft, chopped meat/thin liquid diet with aspiration precautions. Educated pt and family further on aspiration precautions and importance of compensatory swallow techniques to decrease aspiration risk (decrease rate of intake & sip/bite size, upright @HOB for all po intake and ~30 minutes after po); verbalized comprehension. Cognitive-linguistic tx: 
Areas of deficit include visuospatial/executive functioning, abstraction and delayed recall. Patient appears unaware of errors and deficits. Reviewed external and internal memory strategies, handout provided. Delayed recall task completed with 33% acc. utilizing strategies with maxA. Patient completed compare/contrast tasks with 20% acc, independently, increased to 60% with maxA. Patient would benefit from continued ST upon D/C from current facility to address cognitive deficits. Progression toward goals: 
[]         Improving appropriately and progressing toward goals [x]         Improving slowly and progressing toward goals 
[]         Not making progress toward goals and plan of care will be adjusted PLAN: 
Recommendations and Planned Interventions: 
Mech-soft, thin liquid Patient continues to benefit from skilled intervention to address the above impairments. Continue treatment per established plan of care. Discharge Recommendations: To Be Determined SUBJECTIVE:  
Patient stated See you tomorrow. OBJECTIVE:  
Cognitive and Communication Status: 
Neurologic State: Alert, Appropriate for age, Eyes open spontaneously Orientation Level: Oriented X4 Cognition: Appropriate for age attention/concentration, Appropriate decision making, Appropriate safety awareness, Follows commands, Recognition of people/places Perception: Tactile, Verbal, Visual 
Perseveration: Perseverates during conversation Safety/Judgement: Decreased insight into deficits, Decreased awareness of need for safety, Decreased awareness of need for assistance Dysphagia Treatment: 
Oral Assessment: 
Oral Assessment Labial: No impairment Dentition: Natural, Limited Oral Hygiene: Good Lingual: No impairment Velum: No impairment Mandible: No impairment P.O. Trials: 
 Patient Position: OIL 23 Vocal quality prior to P.O.: No impairment Consistency Presented: Thin liquid, Solid, Puree How Presented: Self-fed/presented, Successive swallows, Straw Bolus Acceptance: No impairment Bolus Formation/Control: Impaired Type of Impairment: Delayed, Mastication Propulsion: Delayed (# of seconds) Oral Residue: Less than 10% of bolus, Lingual 
 Initiation of Swallow: Delayed (# of seconds) Laryngeal Elevation: Functional 
 Aspiration Signs/Symptoms: None Pharyngeal Phase Characteristics: Audible swallow Effective Modifications: Alternate liquids/solids, Small sips and bites Cues for Modifications: Moderate Oral Phase Severity: Mild Pharyngeal Phase Severity : Mild PAIN: 
Start of Tx: 0 End of Tx: 0  
 
GCODESwallowing:  Swallow Current Status CK= 40-59%  Swallow Goal Status CI= 1-19% The severity rating is based on the following outcomes: KAY Noms Swallow Level 4 Clinical Judgement After treatment:  
[]              Patient left in no apparent distress sitting up in chair 
[x]              Patient left in no apparent distress in bed 
[x]              Call bell left within reach [x]              Nursing notified 
[x]              Family present 
[]              Caregiver present 
[]              Bed alarm activated COMMUNICATION/EDUCATION:  
[x] Aspiration precautions; swallow safety; compensatory techniques [x]        Expressive communication techniques []        Patient unable to participate in education; education ongoing with staff 
[]  Posted safety precautions in patient's room. [] Oral-motor/laryngeal strengthening exercises FROILAN Ohara Time Calculation: 30 mins Swallow Treatment Time: 15 
Speech Treatment Time: 15

## 2018-10-05 NOTE — CONSULTS
[facility] Consult Note Patient: Shilpi Omalley MRN: [id number]  SSN: [id number] YOB: 1932  Age: 80 y.o. Sex: female Date of Consultation: 10/04/2018 Referring Physician: Cristian Lemus MD 
Reason for Consultation: Pre op risk stratification Chief complain: Syncope HPI:  72-year-old female who was admitted on 09/30/2018 with weakness and syncopal episode. The patient was found to have severe anemia at 5.5 . She underwent colonoscopy revealed mass in ascending colon. Cardiology consult called for pre op risk stratification for surgery for mass in ascending colon. Patient is complaining of midsternal chest pain for past few months. Chest pain is pressure-like, no radiation, with and without exertion and last for few minutes. She is also coming of shortness of breath on exertion. She denies any dizziness, palpitation. She denies any current smoking or alcohol abuse. Past Medical History:  
Diagnosis Date  Acid reflux  Arthritis  Atrial fibrillation ([facility] [address]  Autoimmune disease ([facility] [address] Sarcoidosis  Diabetes ([facility] [address]  High cholesterol  Hypertension  Sarcoidosis of lung ([facility] [address]  Stroke ([facility] [address] Past Surgical History:  
Procedure Laterality Date  COLONOSCOPY N/A 10/3/2018 COLONOSCOPY, POLYPECTOMY, BIOPSY, ENDOSCOPIC TATTOO performed by Cristian Lemus MD at [place] [address] Current Facility-Administered Medications Medication Dose Route Frequency  simvastatin (ZOCOR) tablet 20 mg  20 mg Oral QHS  [START ON 10/5/2018] fluticasone (FLONASE) 50 mcg/actuation nasal spray 2 Spray  2 Spray Both Nostrils DAILY  ipratropium (ATROVENT) 0.02 % nebulizer solution 0.5 mg  0.5 mg Nebulization QID RT  
 metoclopramide HCl (REGLAN) injection 5 mg  5 mg IntraVENous Q6H PRN  pantoprazole (PROTONIX) tablet 40 mg  40 mg Oral ACB  polyethylene glycol (MIRALAX) packet 17 g  17 g Oral BID  
  albuterol (PROVENTIL HFA, VENTOLIN HFA, PROAIR HFA) inhaler 1-2 Puff  1-2 Puff Inhalation Q6H PRN  
 sodium chloride (NS) flush 5-10 mL  5-10 mL IntraVENous Q8H  
 sodium chloride (NS) flush 5-10 mL  5-10 mL IntraVENous PRN  
 aspirin (ASPIRIN) tablet 325 mg  325 mg Oral DAILY  insulin lispro (HUMALOG) injection   SubCUTAneous AC&HS  
 glucose chewable tablet 16 g  4 Tab Oral PRN  
 glucagon (GLUCAGEN) injection 1 mg  1 mg IntraMUSCular PRN  
 dextrose (D50W) injection syrg 12.5-25 g  25-50 mL IntraVENous PRN  
 atorvastatin (LIPITOR) tablet 80 mg  80 mg Oral DAILY  0.9% sodium chloride infusion 250 mL  250 mL IntraVENous PRN Allergies and Intolerances:  
No Known Allergies Family History:  
History reviewed. No pertinent family history. Social History: She  reports that she has quit smoking. Her smoking use included Cigarettes. She has a 75.00 pack-year smoking history. She has never used smokeless tobacco.  She  reports that she does not drink alcohol. Review of Systems:  
 
Gen: No fever, chills, malaise, weight loss/gain. Heent: No headache, rhinorrhea, epistaxis, ear pain, hearing loss, sinus pain, neck pain/stiffness, sore throat. Heart: Positive chest pain, shortness of breath, syncope No palpitations, pnd, or orthopnea. Resp: No cough, hemoptysis, wheezing and dyspnea. GI: No nausea, vomiting, diarrhea, constipation, melena or hematochezia. : No urinary obstruction, dysuria or hematuria. Derm: No rash, new skin lesion or pruritis. Musc/skeletal: no bone or joint complains. Vasc: No edema, cyanosis or claudication. Endo: No heat/cold intolerance, no polyuria,polydipsia or polyphagia. Neuro: No unilateral weakness, numbness, tingling. No seizures. Heme: No easy bruising or bleeding. Physical:  
Patient Vitals for the past 6 hrs: 
 Temp Pulse Resp BP SpO2  
10/04/18 2000 98.7 °F (37.1 °C) 98 18 118/50 98 % Exam: General Appearance: Comfortable, not using accessory muscles of respiration. HEENT: CHAIM. HEAD: Atraumatic NECK: No JVD, no thyroidomeglay. CAROTIDS: No bruit LUNGS: Clear bilaterally. HEART: S1+S2 audible, no murmur, no pericardial rub. ABD: Non-tender, BS Audible EXT: No edema, and no cyanosis. VASCULAR EXAM: Pulses are intact. PSYCHIATRIC EXAM: Mood is appropriate. MUSCULOSKELETAL: Grossly no joint deformity. NEUROLOGICAL: AAO times 3, Motor and sensory sytem intact Review of Data:  
LABS:  
Lab Results Component Value Date/Time WBC 7.1 10/04/2018 03:26 AM  
 HGB 7.9 (L) 10/04/2018 03:26 AM  
 HCT 27.3 (L) 10/04/2018 03:26 AM  
 PLATELET 758 26/12/9048 03:26 AM  
 
Lab Results Component Value Date/Time Sodium 140 10/04/2018 03:26 AM  
 Potassium 4.1 10/04/2018 03:26 AM  
 Chloride 106 10/04/2018 03:26 AM  
 CO2 28 10/04/2018 03:26 AM  
 Glucose 98 10/04/2018 03:26 AM  
 BUN 8 10/04/2018 03:26 AM  
 Creatinine 1.15 10/04/2018 03:26 AM  
 
Lab Results Component Value Date/Time Cholesterol, total 175 09/30/2018 10:00 AM  
 HDL Cholesterol 81 (H) 09/30/2018 10:00 AM  
 LDL, calculated 70 09/30/2018 10:00 AM  
 Triglyceride 120 09/30/2018 10:00 AM  
 
No results found for: GPT Lab Results Component Value Date/Time Hemoglobin A1c 5.5 09/30/2018 10:00 AM  
 
 
 
Cardiology Procedures:  
Results for orders placed or performed during the hospital encounter of 09/30/18 EKG, 12 LEAD, INITIAL Result Value Ref Range Ventricular Rate 105 BPM  
 Atrial Rate 105 BPM  
 P-R Interval 160 ms QRS Duration 76 ms  
 Q-T Interval 330 ms QTC Calculation (Bezet) 436 ms Calculated P Axis 71 degrees Calculated R Axis 48 degrees Calculated T Axis 44 degrees Diagnosis Sinus tachycardia with premature supraventricular complexes Otherwise normal ECG When compared with ECG of 11-JUN-2015 12:46, 
premature supraventricular complexes are now present Impression / Plan:   
Patient Active Problem List  
Diagnosis Code  Dyspnea R06.00   Wheezing R06.2  Sarcoidosis D86.9  Diabetes ([facility] [address] E11.9  Hypoxia R09.02  [facility] I63.9  Syncope R55  Chronic respiratory failure with hypoxia, on home O2 therapy (HCC) J96.11, Z99.81  
 HTN (hypertension) I10  Type II diabetes mellitus, uncontrolled ([facility] [address] E11.65  Severe anemia D64.9  S[facility] lung [facility] D86.0  
 Colonic mass K63.9 Chest pain Mid sternal  
 
80year-old female admitted with syncope found to have severe anemia. She underwent colonoscopy and found to have mass in ascending colon. A she needs preop risk stratification for colon surgery. She is complaining of midsternal chest pain for past few months. Chest pain is with and without exertion. She is also complaining of shortness of breath on exertion. Echocardiogram revealed no wall motion abnormality, grade I diastolic dysfunction, LVEF 66-70%, Mild pulmonary hypertension Troponin is negative Troponin was elevated in past  
 
 
Schedule for Lexiscan stress test for preop risk stratification Continue management as per hospital medicine Plan discussed with patient and family member at bedside they verbally understood. NPO after midnight Signed By: Olvin Ellison MD   
 October 4, 2018

## 2018-10-05 NOTE — ROUTINE PROCESS
Bedside and Verbal shift change report given to [address] (oncoming nurse) by Myke Tao RN 
 (offgoing nurse). Report included the following information SBAR, Kardex, Intake/Output, MAR and Recent Results.

## 2018-10-05 NOTE — PROGRESS NOTES
Transition of care; acute rehab versus rehab Met with patient and her daughter at bedside. Patient states she need surgery but will have to be cleared by cardiology first plan for surgery on Monday. Cm will continue to follow Care Management Interventions PCP Verified by CM: Yes Mode of Transport at Discharge: BLS Transition of Care Consult (CM Consult):  (acute rehab) Physical Therapy Consult: Yes Occupational Therapy Consult: Yes Current Support Network: Other Confirm Follow Up Transport: Family Plan discussed with Pt/Family/Caregiver: Yes Freedom of Choice Offered: Yes Discharge Location Discharge Placement: Rehab Unit Subacute

## 2018-10-05 NOTE — CONSULTS
Consult Note Patient: Taurus Márquez MRN: [id number]  CSN: [id number] YOB: 1932  Age: 80 y.o. Sex: female DOA: 9/30/2018 LOS:  LOS: 4 days Requesting Physician: Dr Nivia Sandoval and Dr. Rhonad Grider Reason for Consultation: colon cancer HPI:  
 
Taurus Márquez is a 80 y.o. female who has been seen for GI bleed. Had colonoscopy and found to have mass in ascending colon. Awaiting biopsies and CEA. CT scan did not show metastatic disease. Past Medical History:  
Diagnosis Date  Acid reflux  Arthritis  Atrial fibrillation ([facility] [place] [address]  [facility] Sarcoidosis  [facility]  High cholesterol  Hypertension  [facility]  Stroke ([facility] [facility] Past Surgical History:  
Procedure Laterality Date  COLONOSCOPY N/A 10/3/2018 COLONOSCOPY, POLYPECTOMY, BIOPSY, ENDOSCOPIC TATTOO performed by Frank Gerber MD at [place] [address] History reviewed. No pertinent family history. Social History Social History  Marital status: UNKNOWN Spouse name: N/A  
 Number of children: N/A  
 Years of education: N/A Social History Main Topics  Smoking status: Former Smoker Packs/day: 2.50 Years: 30.00 Types: Cigarettes  Smokeless tobacco: Never Used  Alcohol use No  
 Drug use: No  
 Sexual activity: Not Asked Other Topics Concern  None Social History Narrative Prior to Admission medications Medication Sig Start Date End Date Taking? Authorizing Provider  
simvastatin (ZOCOR) 20 mg tablet Take 20 mg by mouth nightly. Yes Historical Provider  
montelukast (SINGULAIR) 10 mg tablet Take 10 mg by mouth daily. Yes Historical Provider Brompheniramine-Pseudoeph-DM (DIMETAPP) 2-30-10 mg/5 mL syrup  6/15/16  Yes Historical Provider  
ranitidine (ZANTAC) 150 mg tablet Take 150 mg by mouth two (2) times a day. Yes Historical Provider furosemide (LASIX) 20 mg tablet Take  by mouth daily. Yes Historical Provider  
mometasone (NASONEX) 50 mcg/actuation nasal spray 2 Sprays daily. Yes Historical Provider  
ipratropium (ATROVENT) 0.02 % nebulizer solution 2.5 mL by Nebulization route four (4) times daily. 120 vials 8/10/16  Yes Cindi Segovia MD  
albuterol (PROVENTIL VENTOLIN) 2.5 mg /3 mL (0.083 %) nebulizer solution 3 mL by Nebulization route four (4) times daily. 8/10/16  Yes Cindi Segovia MD  
albuterol (PROVENTIL HFA, VENTOLIN HFA, PROAIR HFA) 90 mcg/actuation inhaler Take 1-2 Puffs by inhalation every six (6) hours as needed for Wheezing. 8/10/16  Yes Cindi Segovia MD  
celecoxib (CELEBREX) 200 mg capsule  2/3/15  Yes Historical Provider  
glipiZIDE (GLUCOTROL) 5 mg tablet Take 5 mg by mouth two (2) times a day. Yes Historical Provider  
metFORMIN (GLUCOPHAGE) 500 mg tablet Take 500 mg by mouth daily (with breakfast). Yes Historical Provider  
pantoprazole (PROTONIX) 40 mg tablet Take 40 mg by mouth daily. Yes Misael Rivers MD  
 
 
No Known Allergies Review of Systems Pertinent items are noted in the History of Present Illness. Physical Exam:  
  
Visit Vitals  /50 (BP 1 Location: Right arm, BP Patient Position: At rest)  Pulse 98  Temp 98.7 °F (37.1 °C)  Resp 18  Ht 5' 3\" (1.6 m)  Wt 92.4 kg (203 lb 9.6 oz)  SpO2 98%  BMI 36.07 kg/m2 Physical Exam: 
Physical Exam:  
General:  Alert, cooperative, no distress, appears stated age. Eyes:  Conjunctivae/corneas clear. PERRL, EOMs intact. Fundi benign Ears:  Normal TMs and external ear canals both ears. Nose: Nares normal. Septum midline. Mucosa normal. No drainage or sinus tenderness. Mouth/Throat: Lips, mucosa, and tongue normal. Teeth and gums normal.  
Neck: Supple, symmetrical, trachea midline, no adenopathy, thyroid: no enlargement/tenderness/nodules, no carotid bruit and no JVD. Back:   Symmetric, no curvature. ROM normal. No CVA tenderness. Lungs:   Clear to auscultation bilaterally. Heart:  Regular rate and rhythm, S1, S2 normal, no murmur, click, rub or gallop. Abdomen:   Soft, non-tender. Bowel sounds normal. No masses,  No organomegaly. Extremities: Extremities normal, atraumatic, no cyanosis or edema. Pulses: 2+ and symmetric all extremities. Skin: Skin color, texture, turgor normal. No rashes or lesions Lymph nodes: Cervical, supraclavicular, and axillary nodes normal.  
Neurologic: CNII-XII intact. Normal strength, sensation and reflexes throughout. Labs Reviewed: 
BMP:  
Lab Results Component Value Date/Time  10/04/2018 03:26 AM  
 K 4.1 10/04/2018 03:26 AM  
  10/04/2018 03:26 AM  
 CO2 28 10/04/2018 03:26 AM  
 AGAP 6 10/04/2018 03:26 AM  
 GLU 98 10/04/2018 03:26 AM  
 BUN 8 10/04/2018 03:26 AM  
 CREA 1.15 10/04/2018 03:26 AM  
 GFRAA 54 (L) 10/04/2018 03:26 AM  
 GFRNA 45 (L) 10/04/2018 03:26 AM  
 
CMP:  
Lab Results Component Value Date/Time  10/04/2018 03:26 AM  
 K 4.1 10/04/2018 03:26 AM  
  10/04/2018 03:26 AM  
 CO2 28 10/04/2018 03:26 AM  
 AGAP 6 10/04/2018 03:26 AM  
 GLU 98 10/04/2018 03:26 AM  
 BUN 8 10/04/2018 03:26 AM  
 CREA 1.15 10/04/2018 03:26 AM  
 GFRAA 54 (L) 10/04/2018 03:26 AM  
 GFRNA 45 (L) 10/04/2018 03:26 AM  
 CA 8.8 10/04/2018 03:26 AM  
 
CBC:  
Lab Results Component Value Date/Time WBC 7.1 10/04/2018 03:26 AM  
 HGB 7.9 (L) 10/04/2018 03:26 AM  
 HCT 27.3 (L) 10/04/2018 03:26 AM  
  10/04/2018 03:26 AM  
 
All Cardiac Markers in the last 24 hours:  
Lab Results Component Value Date/Time  CPK 58 10/04/2018 03:26 AM  
 CKMB <1.0 10/04/2018 03:26 AM  
 CKND1  10/04/2018 03:26 AM  
  CALCULATION NOT PERFORMED WHEN RESULT IS BELOW LINEAR LIMIT  
 TROIQ <0.02 10/04/2018 03:26 AM  
 
COAGS: No results found for: APTT, PTP, INR 
 Liver Panel: No results found for: ALB, CBIL, TBIL, TP, GLOB, AGRAT, SGOT, ASTPOC, ALTPOC, ALT, GPT, AP Assessment/Plan Principal Problem: 
  Syncope (9/30/2018) Active Problems: 
  Sarcoidosis (2/21/2015) Hypoxia (2/22/2015) Stroke [facility]) (9/30/2018) Chronic respiratory failure with hypoxia, on home O2 therapy ([address] (9/30/2018) HTN (hypertension) (9/30/2018) Type II diabetes mellitus, uncontrolled ([address] (9/30/2018) Severe anemia (9/30/2018) Sarcoidosis of lung ([address] (10/4/2018) Colonic mass (10/4/2018) CT images viewed. Plan for pt to have eval and clearance by cardiology and pulmonary. Continue nutrition. Will check with OR schedule for resection hopefully during this hospital stay. Inna Swanson, DO 
10/4/2018

## 2018-10-05 NOTE — PROGRESS NOTES
Problem: Mobility Impaired (Adult and Pediatric) Goal: *Acute Goals and Plan of Care (Insert Text) Physical Therapy Goals Initiated 10/1/2018 and to be accomplished within 7 day(s) 1. Patient will move from supine <> sit with S in prep for out of bed activity and change of position. 2.  Patient will perform sit<> stand with S/RW in prep for transfers/ambulation. 3.  Patient will transfer from bed <> chair with S/RW for time up in chair for completion of ADL activity. 4.  Patient will ambulate 100 feet with S/RW without LOB/path deviatons for improved functional mobility/qait/safe discharge. Outcome: Progressing Towards Goal 
physical Therapy TREATMENT Patient: Radha Merritt (6 y.o. female) Date: 10/5/2018 Diagnosis: Stroke ([facility] [place] [address] 
anemia  Syncope Procedure(s) (LRB): 
COLONOSCOPY, POLYPECTOMY, BIOPSY, ENDOSCOPIC TATTOO (N/A) 2 Days Post-Op Precautions: Fall, Aspiration, Skin Chart, physical therapy assessment, plan of care and goals were reviewed. ASSESSMENT: 
Less cuing for mobility safety, further distance accomplished and no C/o pain. Pt is progress to a level exceeding goals, but family continues to express concern about her functional ability. Will have PT re-eval for D/c or goal increase (As Pt will have surgical procedure in near future). Progression toward goals: 
[]      Improving appropriately and progressing toward goals [x]      Improving slowly and progressing toward goals 
[]      Not making progress toward goals and plan of care will be adjusted PLAN: 
Patient continues to benefit from skilled intervention to address the above impairments. Continue treatment per established plan of care. Discharge Recommendations: To Be Determined (after surgical procedure) Further Equipment Recommendations for Discharge:  bedside commode and rolling walker SUBJECTIVE:  
Patient stated I'm ready for you.  OBJECTIVE DATA SUMMARY:  
Critical Behavior: Neurologic State: Alert, Appropriate for age, Eyes open spontaneously Orientation Level: Oriented X4 Cognition: Appropriate for age attention/concentration, Appropriate decision making, Appropriate safety awareness, Follows commands, Recognition of people/places Safety/Judgement: Decreased insight into deficits, Decreased awareness of need for safety, Decreased awareness of need for assistance Functional Mobility Training: 
Bed Mobility: 
Rolling: Supervision Supine to Sit: Supervision Sit to Supine: Supervision Transfers: 
Sit to Stand: Stand-by assistance Stand to Sit: Stand-by assistance Stand Pivot Transfers: Stand-by assistance Balance: 
Sitting: Intact Sitting - Static: Good (unsupported) Standing: Intact; With support Standing - Static: Good Standing - Dynamic : Fair Ambulation/Gait Training: 
Distance (ft): 220 Feet (ft) Assistive Device: Gait belt;Walker, rolling Ambulation - Level of Assistance: Stand-by assistance Gait Abnormalities: Decreased step clearance; Step to gait Base of Support: Widened Speed/Peggy: Slow Step Length: Right shortened;Left shortened Pain: 
Pain Scale 1: Numeric (0 - 10) Pain Intensity 1: 0 Activity Tolerance:  
Good Please refer to the flowsheet for vital signs taken during this treatment. After treatment:  
[x] Patient left in no apparent distress sitting up in chair 
[] Patient left in no apparent distress in bed 
[x] Call bell left within reach [x] Nursing notified 
[x] Caregiver present 
[] Bed alarm activated Tawny Vail PTA Time Calculation: 35 mins

## 2018-10-05 NOTE — PROGRESS NOTES
Hospitalist Progress Note-critical care note Patient: Maren Brink MRN: [id number]  CSN: [id number] YOB: 1932  Age: 80 y.o. Sex: female DOA: 9/30/2018 LOS:  LOS: 5 days Chief complaint: sarcoidosis of lung. Severe anemia, colon mass ,stroke , Dm Assessment/Plan Hospital Problems  Date Reviewed: 9/30/2018 Codes Class Noted POA Sarcoidosis of lung ([facility] ICD-10-CM: D86.0 ICD-9-CM: 135, 517.8  10/4/2018 Unknown Colonic mass ICD-10-CM: K63.9 ICD-9-CM: 569.89  10/4/2018 Unknown Stroke [facility]) ICD-10-CM: I63.9 ICD-9-CM: 434.91  9/30/2018 Unknown * (Principal)Syncope ICD-10-CM: R55 
ICD-9-CM: 780.2  9/30/2018 Unknown Chronic respiratory failure with hypoxia, on home O2 therapy (HCC) ICD-10-CM: J96.11, Z99.81 ICD-9-CM: 518.83, 799.02, V46.2  9/30/2018 Unknown HTN (hypertension) ICD-10-CM: I10 
ICD-9-CM: 401.9  9/30/2018 Unknown Type II diabetes mellitus, uncontrolled ([facility] ICD-10-CM: E11.65 ICD-9-CM: 250.02  9/30/2018 Unknown Severe anemia ICD-10-CM: D64.9 ICD-9-CM: 285.9  9/30/2018 Unknown Hypoxia ICD-10-CM: R09.02 
ICD-9-CM: 799.02  2/22/2015 Yes Sarcoidosis ICD-10-CM: D86.9 ICD-9-CM: 135  2/21/2015 Yes Severe anemia 
 received two units prbc Will continue monitoring, Gi colonoscopy done with colon mass Colon mass Suspected colon cancer, ct abdomen having enlarged lymphanode-suspected metastatic disease Case discussed with dr. Fletcher Orr and Dr. Bushra Washburn test today and results still pending Syncope or possible acute CVA present upon admission vs severe anemia  
[place] brain possible acute infarct NIDDM Continue ssi COPD on home 02 Continue breathing tx and nc O2 Sarcoidosis see above  
  
Daughters was at the bedside. Disposition :tbd,  
 
Subjective: feel fine Planning surgery Monday Review of systems: 
 
General: No fevers or chills. Cardiovascular: No chest pain or pressure. No palpitations. Pulmonary: No shortness of breath. Gastrointestinal: No nausea, vomiting. Vital signs/Intake and Output: 
Visit Vitals  /51 (BP 1 Location: Right arm, BP Patient Position: At rest)  Pulse 90  Temp 98.7 °F (37.1 °C)  Resp 17  Ht 5' 3\" (1.6 m)  Wt 93.4 kg (206 lb)  SpO2 100%  BMI 36.49 kg/m2 Current Shift:  10/05 0701 - 10/05 1900 In: 240 [P.O.:240] Out: - Last three shifts:  10/03 1901 - 10/05 0700 In: 200 [P.O.:770] Out: 0 Physical Exam: 
General: WD, WN. Alert, cooperative, no acute distress   
HEENT: NC, Atraumatic. PERRLA, anicteric sclerae. Lungs: CTA Bilaterally. No Wheezing/Rhonchi/Rales. Heart:  Regular  Rhythm, + murmur, No Rubs, No Gallops Abdomen: Soft, Non distended, Non tender.  +Bowel sounds, Extremities: No c/c/e Psych:   Not anxious or agitated. Neurologic:  No acute neurological deficit. Labs: Results:  
   
Chemistry Recent Labs 10/04/18 
 4855 GLU  98 NA  140  
K  4.1 CL  106 CO2  28 BUN  8  
CREA  1.15  
CA  8.8 AGAP  6  
BUCR  7*  
  
CBC w/Diff Recent Labs 10/04/18 
 3330 WBC  7.1 RBC  3.93* HGB  7.9*  
HCT  27.3*  
PLT  257 Cardiac Enzymes Recent Labs 10/04/18 
 7921 CPK  58 CKND1  CALCULATION NOT PERFORMED WHEN RESULT IS BELOW LINEAR LIMIT Coagulation No results for input(s): PTP, INR, APTT in the last 72 hours. No lab exists for component: INREXT, INREXT Lipid Panel Lab Results Component Value Date/Time Cholesterol, total 175 09/30/2018 10:00 AM  
 HDL Cholesterol 81 (H) 09/30/2018 10:00 AM  
 LDL, calculated 70 09/30/2018 10:00 AM  
 VLDL, calculated 24 09/30/2018 10:00 AM  
 Triglyceride 120 09/30/2018 10:00 AM  
 CHOL/HDL Ratio 2.2 09/30/2018 10:00 AM  
  
BNP No results for input(s): BNPP in the last 72 hours. Liver Enzymes No results for input(s): TP, ALB, TBIL, AP, SGOT, GPT in the last 72 hours. No lab exists for component: DBIL Thyroid Studies No results found for: T4, T3U, TSH, TSHEXT, TSHEXT Procedures/imaging: see electronic medical records for all procedures/Xrays and details which were not copied into this note but were reviewed prior to creation of Plan Dhara Harris MD

## 2018-10-06 NOTE — PROGRESS NOTES
Gave pt and family (5) cancer confirmation. Spent 45min discussing the diagnosis (video taped by family), the next steps, the operative approach, risks, benefits, and alternatives. All questions answered. Will move forward with surgery on Monday afternoon. Considering lap right colectomy however want to reduce anesthesia time due to cardiopulmonary risks and pt has had cholecystectomy which may have caused adhesions that make laparoscopic approach not possible. Pt to eat ad manohar for now. Will start clears at noon on Sunday. Will also start mechanical and antibiotic bowel prep on Sunday night. Wilda Dimas,  
10/5/2018

## 2018-10-06 NOTE — PROGRESS NOTES
Cardiology Progress Note Patient: Radha Merritt        Sex: female          DOA: 9/30/2018 YOB: 1932      Age:  80 y.o.        LOS:  LOS: 5 days Patient seen and examined, chart reviewed Assessment/Plan Patient Active Problem List  
Diagnosis Code  Dyspnea R06.00    Sarcoidosis D86.9  Diabetes ([facility] E11.9  Hypoxia R09.02  Stroke ([facility] I63.9  Syncope R55  Chronic respiratory failure with hypoxia, on home O2 therapy (HCC) J96.11, Z99.81  
 HTN (hypertension) I10  Type II diabetes mellitus, uncontrolled ([facility] E11.65  Severe anemia D64.9  Sarcoidosis of lung ([facility] D86.0  
 Colonic mass K63.9 Pre op risk stratification for colon mass surgery Mid sternal chest pain Shortness of breath on exertion Pulmonary hypertension Echocardiogram revealed no wall motion abnormality, Grade I diastolic dysfunction, LVEF 66-70% Lexiscan stress test is negative for ischemia Plan: 
 
Patient is Moderate risk for elsa procedural cardiac event for upcoming colon mass surgery. Stress test result discussed with patient and family member at bed side. Continue management as per hospital medicine. Call on call cardiologist if needed. Subjective:  
 cc: 
Denies any chest pain or shortness of breath at rest 
 
REVIEW OF SYSTEMS:  
 
General: No fevers or chills. Cardiovascular: No chest pain,No palpitations, No orthopnea, No PND, No leg swelling, No claudication Pulmonary: No dyspnea. Gastrointestinal: No nausea, vomiting, Positive bleeding Neurology: No Dizziness Objective:  
  
Visit Vitals  /63 (BP 1 Location: Right arm, BP Patient Position: At rest)  Pulse 97  Temp 98.4 °F (36.9 °C)  Resp 20  
 Ht 5' 3\" (1.6 m)  Wt 93.4 kg (206 lb)  SpO2 100%  BMI 36.49 kg/m2 Body mass index is 36.49 kg/(m^2). Physical Exam: General Appearance: Comfortable, not using accessory muscles of respiration. HEENT: CHAIM. HEAD: Atraumatic NECK: No JVD, no thyroidomeglay. CAROTIDS: No bruit LUNGS: Clear bilaterally. HEART: S1+S2 audible, no murmur, no pericardial rub. ABD: Non-tender, BS Audible EXT: No edema, and no cyanosis. VASCULAR EXAM: Pulses are intact. PSYCHIATRIC EXAM: Mood is appropriate. MUSCULOSKELETAL: Grossly no joint deformity. NEUROLOGICAL: AAO times 3, No motor and sensory deficit Medication: 
Current Facility-Administered Medications Medication Dose Route Frequency  simvastatin (ZOCOR) tablet 20 mg  20 mg Oral QHS  fluticasone (FLONASE) 50 mcg/actuation nasal spray 2 Spray  2 Spray Both Nostrils DAILY  ipratropium (ATROVENT) 0.02 % nebulizer solution 0.5 mg  0.5 mg Nebulization QID RT  
 metoclopramide HCl (REGLAN) injection 5 mg  5 mg IntraVENous Q6H PRN  pantoprazole (PROTONIX) tablet 40 mg  40 mg Oral ACB  polyethylene glycol (MIRALAX) packet 17 g  17 g Oral BID  albuterol (PROVENTIL HFA, VENTOLIN HFA, PROAIR HFA) inhaler 1-2 Puff  1-2 Puff Inhalation Q6H PRN  
 sodium chloride (NS) flush 5-10 mL  5-10 mL IntraVENous Q8H  
 sodium chloride (NS) flush 5-10 mL  5-10 mL IntraVENous PRN  
 aspirin (ASPIRIN) tablet 325 mg  325 mg Oral DAILY  insulin lispro (HUMALOG) injection   SubCUTAneous AC&HS  
 glucose chewable tablet 16 g  4 Tab Oral PRN  
 glucagon (GLUCAGEN) injection 1 mg  1 mg IntraMUSCular PRN  
 dextrose (D50W) injection syrg 12.5-25 g  25-50 mL IntraVENous PRN  
 atorvastatin (LIPITOR) tablet 80 mg  80 mg Oral DAILY  0.9% sodium chloride infusion 250 mL  250 mL IntraVENous PRN Lab/Data Reviewed: 
 
  
Recent Labs 10/04/18 
 3808 WBC  7.1 HGB  7.9*  
HCT  27.3*  
PLT  257 Recent Labs 10/04/18 
 2903 NA  140  
K  4.1 CL  106 CO2  28  
GLU  98 BUN  8  
CREA  1.15  
CA  8.8 Signed By: Lola Byrne MD   
 October 5, 2018

## 2018-10-06 NOTE — ROUTINE PROCESS
Bedside and Verbal shift change report given to Kemi Parker (oncoming nurse) by Ghada Lopez RN 
 (offgoing nurse). Report included the following information SBAR, Kardex, Intake/Output, MAR and Recent Results.

## 2018-10-06 NOTE — PROGRESS NOTES
Problem: Mobility Impaired (Adult and Pediatric) Goal: *Acute Goals and Plan of Care (Insert Text) Physical Therapy Goals Initiated 10/1/2018 and to be accomplished within 7 day(s) 1. Patient will move from supine <> sit with S in prep for out of bed activity and change of position. 2.  Patient will perform sit<> stand with S/RW in prep for transfers/ambulation. 3.  Patient will transfer from bed <> chair with S/RW for time up in chair for completion of ADL activity. 4.  Patient will ambulate 100 feet with S/RW without LOB/path deviatons for improved functional mobility/qait/safe discharge. Reviewed and updated 10/6/2018 and to be accomplished within 7 day(s) 1. Patient will move from supine to sit and sit to supine  in bed with modified independence. 2.  Patient will transfer from bed to chair and chair to bed with modified independence using the least restrictive device. 3.  Patient will perform sit to stand with modified independence. 4.  Patient will ambulate with modified independence for 300 feet with the least restrictive device. 5.  Patient will ascend/descend 1 stairs without handrail(s) with supervision. Outcome: Progressing Towards Goal 
physical Therapy RE-EVALUATION and TREATMENT Patient: Valery Ortiz (2 y.o. female) Date: 10/6/2018 Diagnosis: Stroke ([address] 
anemia  Syncope Procedure(s) (LRB): 
COLONOSCOPY, POLYPECTOMY, BIOPSY, ENDOSCOPIC TATTOO (N/A) 3 Days Post-Op Precautions: Fall, Aspiration, Skin ASSESSMENT: 
Pt progressing well with physical therapy. Goals updated and will continue to progress as pt tolerates. Pt continues to demonstrate fatigue and decreased endurance with ambulation requiring several standing rest breaks. Pt would benefit from continued skilled PT services to improve overall functional mobility and safety. Pt has surgery planned for Monday. Patient's progression toward goals since last assessment: Good PLAN: 
 Goals have been updated based on progression since last assessment. Patient continues to benefit from skilled intervention to address the above impairments. Continue to follow the patient 1-2 times per day/4-7 days per week to address goals. Planned Interventions: 
[x]     Bed Mobility Training          [x]     Neuromuscular Re-Education 
[x]     Transfer Training                []    Orthotic/Prosthetic Training 
[x]     Gait Training                       []     Modalities [x]     Therapeutic Exercises       []     Edema Management/Control 
[x]     Therapeutic Activities         [x]     Patient and Family Training/Education 
[]     Other (comment): 
Discharge Recommendations: Rehab Further Equipment Recommendations for Discharge: TBD SUBJECTIVE:  
Patient stated I am doing pretty good.  OBJECTIVE DATA SUMMARY:  
Critical Behavior: 
Neurologic State: Alert, Appropriate for age Orientation Level: Oriented X4 Cognition: Follows commands Safety/Judgement: Decreased insight into deficits, Decreased awareness of need for safety, Decreased awareness of need for assistance Functional Mobility Training: 
Bed Mobility: 
Rolling: Supervision Supine to Sit: Supervision Sit to Supine: Supervision Transfers: 
Sit to Stand: Stand-by assistance Stand to Sit: Stand-by assistance Balance: 
Sitting: Intact Standing: Intact; With support Ambulation/Gait Training: 
Distance (ft): 200 Feet (ft) Assistive Device: Gait belt;Walker, rolling Ambulation - Level of Assistance: Stand-by assistance Gait Abnormalities: Decreased step clearance Base of Support: Widened Speed/Peggy: Slow Step Length: Right shortened;Left shortened Pain: 
Pain Scale 1: Numeric (0 - 10) Pain Intensity 1: 0 Activity Tolerance:  
Good Please refer to the flowsheet for vital signs taken during this treatment. After treatment:  
[]  Patient left in no apparent distress sitting up in chair [x]  Patient left in no apparent distress in bed 
[x]  Call bell left within reach [x]  Nursing notified 
[]  Caregiver present 
[]  Bed alarm activated Andrea Rajput Time Calculation: 25 mins

## 2018-10-06 NOTE — PROGRESS NOTES
INITIAL NUTRITION ASSESSMENT 
  
RECOMMENDATIONS/PLAN:  
Will add magic cup to try to optimize nutrition prior to surgery Awaiting decision from MD regarding ERAS, if warranted will provide drinks tomorrow Monitor oral intakes/encourage and document Monitor weight/fluid status, skin integrity, bowel function Will continue to follow and adjust reccs as needed REASON FOR ASSESSMENT:  
[x]  MD Consult:  
 [] General Nutrition Management and Supplements 
 [] Management of Tube Feeding 
 [] Calorie Count  
 [] Diet Education NUTRITION ASSESSMENT:  
Client History: 80 yrs old Female admitted with s/p episode of syncope and left sided weakness. Pt s/p colonoscopy for severe iron deficiency anemia, with mass found per MD note. Planning for resection per MD note. Speech therapy following recc chopped meats with thin liquids per note on 10/5. PMHx: acid reflux, arthritis, atrial fib, DM, high cholesterol, HTN, sarcoidosis of lung Cultural/Tenriism Food Preferences: None Identified FOOD/NUTRITION HISTORY Diet History: unable to obtain at this time Food Allergies:  [x] NKFA Pertinent PTA Medications: zantac, lasix, metformin, protonix NUTRITION INTAKE Diet Order: chopped meats, glucerna all meals Average PO Intake:      
Patient Vitals for the past 100 hrs: 
 % Diet Eaten 10/06/18 0819 90 % 10/05/18 1151 50 % 10/05/18 0600 0 % 10/04/18 1626 90 % 10/04/18 1341 100 % 10/04/18 1211 100 % 10/04/18 0902 45 % 10/03/18 1940 50 % 10/02/18 0911 50 % Pertinent Medications:  [x] Reviewed; protonix, lispro, miralax Electrolyte Replacement Protocol: []K  []Mg  []PO4 Insulin:  [] SSI  [x] Pre-meal   []  Basal   [] Drip  [] None Pt expected to meet estimated nutrient needs through next review:          [x]  Yes      ANTHROPOMETRICS Height: 5' 3\" (160 cm)       Weight: 93.4 kg (206 lb) BMI: 36.6 kg/m^2  -  obese (30%-39.9% BMI) Weight change: unable to obtain wt hx at this time to assess for wt loss PTA Comparison to Reference Standards: IBW: 115 lbs      %IBW: 179%      AdjBW: 62.6 kg NUTRITION-FOCUSED PHYSICAL ASSESSMENT Skin: skin intact per documentation GI: BM 10/4 BIOCHEMICAL DATA & MEDICAL TESTS Pertinent Labs:  [x] Reviewed NUTRITION PRESCRIPTION Calories: 2191 kcal/day based on 35kcal/kg ABW Protein: 81 g/day based on 1.3 g/kg ABW 
CHO: 274 g/day based on 50% of total energy Fluid: 2191 ml/day based on 1 kcal/ml NUTRITION DIAGNOSES:  
1. Increased energy needs related to upcoming surgery as evidenced by plan for colorectal resection per MD note on Monday, optimized nutrition prior to surgery. NUTRITION INTERVENTIONS:  
INTERVENTIONS:        GOALS: 
1. Commercial food: magic cup TID 1. PO intakes of meals and supplements >50% throughout the next 3 days LEARNING NEEDS (Diet, Supplementation, Food/Nutrient-Drug Interaction):  
[x] None Identified 
[] Inpatient education provided/documented   
[] Identified and patient:  [] Declined     [] Was not appropriate/indicated NUTRITION MONITORING /EVALUATION:  
Follow PO intake Monitor wt Monitor renal labs, electrolytes, fluid status Monitor for additional supplement needs 
 
[] Participated in Interdisciplinary Rounds 
[x] [facility] Reviewed/Documented DISCHARGE NUTRITION RECOMMENDATIONS ADDRESSED:  
  
  [x] To be determined closer to discharge NUTRITION RISK:     [x]  At risk                     []  Not currently at risk Will follow-up per policy. Nicci Chavez, [address] [place] [address]

## 2018-10-06 NOTE — PROGRESS NOTES
Hospitalist Progress Note Patient: Adan Xie MRN: [id number]  CSN: [id number] YOB: 1932  Age: 80 y.o. Sex: female DOA: 9/30/2018 LOS:  LOS: 6 days IMPRESSION and Plan: 
 
Adan Xie is a 80 y.o. female with Patient Active Problem List  
 Diagnosis Date Noted  Sarcoidosis of lung ([address] 10/04/2018  Colonic mass 10/04/2018  Stroke ([address] 09/30/2018  Syncope 09/30/2018  Chronic respiratory failure with hypoxia, on home O2 therapy ([address] 09/30/2018  
 HTN (hypertension) 09/30/2018  Type II diabetes mellitus, uncontrolled ([address] 09/30/2018  Severe anemia 09/30/2018  Hypoxia 02/22/2015  Diabetes ([address]  Dyspnea 02/21/2015  Troponin level elevated 02/21/2015  Wheezing 02/21/2015  Sarcoidosis 02/21/2015 Principal Problem: 
  Syncope (9/30/2018) Active Problems: 
  Sarcoidosis (2/21/2015) Hypoxia (2/22/2015) Stroke [facility]) (9/30/2018) Chronic respiratory failure with hypoxia, on home O2 therapy ([address] (9/30/2018) HTN (hypertension) (9/30/2018) Type II diabetes mellitus, uncontrolled ([address] (9/30/2018) Severe anemia (9/30/2018) Sarcoidosis of lung ([address] (10/4/2018) Colonic mass (10/4/2018) 
 
 
 
  
  
  
Severe anemia  --s/p 2 untis prbc. Fu h/h tomorrow and consider PRBC prior to her sug 
  
Colon mass  - for surg on Monday afternoon. Cleared by cadiology 
  
   
NIDDM   Continue ssi  
   
COPD on home 02 - cont nebs Patient's condition is fair D/w faimily (daughter and son) at bedside and answered all questiions Recommend to continue hospitalization. Discussed with patient and family Chief Complaints: Chief Complaint Patient presents with  Extremity Weakness SUBJECTIVE: 
Pt is seen and examined. Chart reviwed. Family is at bedside. \" I am ready for my surg\" No diarrhea No CP or SOB No Fever, chills, Nausea, vomitting. Review of systems: General: No fevers or chills. Cardiovascular: No chest pain or pressure. No palpitations. Pulmonary: shortness of breath. Gastrointestinal: No nausea, vomiting PE: 
Patient Vitals for the past 24 hrs: 
 BP Temp Pulse Resp SpO2  
10/06/18 1523 - - - - 99 % 10/06/18 1200 114/48 97.9 °F (36.6 °C) 94 18 99 % 10/06/18 1134 - - - - 98 % 10/06/18 0750 123/73 98.2 °F (36.8 °C) 95 18 100 % 10/06/18 0723 - - - - 98 % 10/06/18 0400 121/68 98.2 °F (36.8 °C) 91 18 100 % 10/06/18 0000 122/63 98.3 °F (36.8 °C) 97 20 100 % 10/05/18 2127 - - - - 99 % 10/05/18 2005 129/68 98 °F (36.7 °C) 100 18 99 % 10/05/18 1642 123/62 98.2 °F (36.8 °C) (!) 101 17 100 % Intake/Output Summary (Last 24 hours) at 10/06/18 1606 Last data filed at 10/06/18 1203 Gross per 24 hour Intake              480 ml Output                0 ml Net              480 ml Patient Vitals for the past 120 hrs: 
 Weight  
10/02/18 0421 97.5 kg (215 lb) 10/04/18 0308 92.4 kg (203 lb 9.6 oz) 10/05/18 0015 93.8 kg (206 lb 11.2 oz) 10/05/18 0941 93.4 kg (206 lb) HEENT: Perrla, EOMI; oral mucosa well prefused; Conjunctiva not injected Neck: No JVD, Negative carotid bruits, normal pulses; No thyromegaly Resp: CTA bilaterally; No wheezes or rales CV: RRR s1s2 No murmur; No rubs; PMI not displaced Abd: Positive Bowel Sounds, Soft, Nontender Ext: No clubbing; No cyanosis; No edema Neuro: Alert and oriented; Nonfocal 
Skin: Warm, Dry, Intact Pulses: 2+ DP/PT/Rad Intake and Output: 
Current Shift:  10/06 0701 - 10/06 1900 In: 480 [P.O.:480] Out: - Last three shifts:  10/04 1901 - 10/06 0700 In: 240 [P.O.:240] Out: 0 Lab/Data Reviewed: 
Recent Results (from the past 8 hour(s)) GLUCOSE, POC Collection Time: 10/06/18 11:38 AM  
Result Value Ref Range Glucose (POC) 159 (H) 70 - 110 mg/dL Medications: 
Current Facility-Administered Medications Medication Dose Route Frequency  simvastatin (ZOCOR) tablet 20 mg  20 mg Oral QHS  fluticasone (FLONASE) 50 mcg/actuation nasal spray 2 Spray  2 Spray Both Nostrils DAILY  ipratropium (ATROVENT) 0.02 % nebulizer solution 0.5 mg  0.5 mg Nebulization QID RT  
 metoclopramide HCl (REGLAN) injection 5 mg  5 mg IntraVENous Q6H PRN  pantoprazole (PROTONIX) tablet 40 mg  40 mg Oral ACB  polyethylene glycol (MIRALAX) packet 17 g  17 g Oral BID  albuterol (PROVENTIL HFA, VENTOLIN HFA, PROAIR HFA) inhaler 1-2 Puff  1-2 Puff Inhalation Q6H PRN  
 sodium chloride (NS) flush 5-10 mL  5-10 mL IntraVENous Q8H  
 sodium chloride (NS) flush 5-10 mL  5-10 mL IntraVENous PRN  
 aspirin (ASPIRIN) tablet 325 mg  325 mg Oral DAILY  insulin lispro (HUMALOG) injection   SubCUTAneous AC&HS  
 glucose chewable tablet 16 g  4 Tab Oral PRN  
 glucagon (GLUCAGEN) injection 1 mg  1 mg IntraMUSCular PRN  
 dextrose (D50W) injection syrg 12.5-25 g  25-50 mL IntraVENous PRN  
 atorvastatin (LIPITOR) tablet 80 mg  80 mg Oral DAILY  0.9% sodium chloride infusion 250 mL  250 mL IntraVENous PRN Recent Results (from the past 24 hour(s)) GLUCOSE, POC Collection Time: 10/05/18  4:38 PM  
Result Value Ref Range Glucose (POC) 144 (H) 70 - 110 mg/dL GLUCOSE, POC Collection Time: 10/05/18 11:21 PM  
Result Value Ref Range Glucose (POC) 211 (H) 70 - 110 mg/dL CBC WITH AUTOMATED DIFF Collection Time: 10/06/18  4:15 AM  
Result Value Ref Range WBC 5.6 4.6 - 13.2 K/uL  
 RBC 3.59 (L) 4.20 - 5.30 M/uL HGB 7.3 (L) 12.0 - 16.0 g/dL HCT 25.3 (L) 35.0 - 45.0 % MCV 70.5 (L) 74.0 - 97.0 FL  
 MCH 20.3 (L) 24.0 - 34.0 PG  
 MCHC 28.9 (L) 31.0 - 37.0 g/dL RDW 26.2 (H) 11.6 - 14.5 % PLATELET 433 351 - 996 K/uL MPV 9.1 (L) 9.2 - 11.8 FL  
 NEUTROPHILS 63 40 - 73 % LYMPHOCYTES 21 21 - 52 % MONOCYTES 13 (H) 3 - 10 % EOSINOPHILS 3 0 - 5 % BASOPHILS 0 0 - 2 %  
 ABS. NEUTROPHILS 3.6 1.8 - 8.0 K/UL ABS. LYMPHOCYTES 1.2 0.9 - 3.6 K/UL  
 ABS. MONOCYTES 0.7 0.05 - 1.2 K/UL  
 ABS. EOSINOPHILS 0.2 0.0 - 0.4 K/UL  
 ABS. BASOPHILS 0.0 0.0 - 0.1 K/UL  
 DF AUTOMATED METABOLIC PANEL, BASIC Collection Time: 10/06/18  4:15 AM  
Result Value Ref Range Sodium 142 136 - 145 mmol/L Potassium 4.8 3.5 - 5.5 mmol/L Chloride 108 100 - 108 mmol/L  
 CO2 31 21 - 32 mmol/L Anion gap 3 3.0 - 18 mmol/L Glucose 107 (H) 74 - 99 mg/dL BUN 14 7.0 - 18 MG/DL Creatinine 1.23 0.6 - 1.3 MG/DL  
 BUN/Creatinine ratio 11 (L) 12 - 20 GFR est AA 50 (L) >60 ml/min/1.73m2 GFR est non-AA 41 (L) >60 ml/min/1.73m2 Calcium 8.5 8.5 - 10.1 MG/DL  
GLUCOSE, POC Collection Time: 10/06/18  6:49 AM  
Result Value Ref Range Glucose (POC) 131 (H) 70 - 110 mg/dL GLUCOSE, POC Collection Time: 10/06/18 11:38 AM  
Result Value Ref Range Glucose (POC) 159 (H) 70 - 110 mg/dL Procedures/imaging: see electronic medical records for all procedures/Xrays and details which were not copied into this note but were reviewed prior to creation of Amina Evans MD  
10/6/2018, 4:06 PM

## 2018-10-06 NOTE — PROGRESS NOTES
Shift Summary Note: Assumed care of patient in bed awake and quiet. Uneventful night resting quietl all night getting up to go to bathroom, No s/s of distres or discomfort. Patient Vitals for the past 12 hrs: 
 Temp Pulse Resp BP SpO2  
10/06/18 0400 98.2 °F (36.8 °C) 91 18 121/68 100 % 10/06/18 0000 98.3 °F (36.8 °C) 97 20 122/63 100 % 10/05/18 2127 - - - - 99 % 10/05/18 2005 98 °F (36.7 °C) 100 18 129/68 99 %

## 2018-10-06 NOTE — PROGRESS NOTES
[facility] Pulmonary, Critical Care, and Sleep Medicine Name: Denny Mckeon MRN: [id number] : 1932 Hospital: [facility] [place] Date: 10/6/2018 PCCM Note Consult requesting physician: Dr. Harinder Schulz  
Reason for Consult: hemoptysis, pulmonary sarcoidosis IMPRESSION:  
· Adenocarcinoma of colon (syncope and anemia leading to further evaluation and diagnosis of colon mass) · Syncope, due to severe anemia. Nuclear stress test negative. · S/p very mild hemoptysis, very mild blood tinged sputum couple times since last couple days. Resolved. CT chest unremarkable for any causes for hemoptysis. · Hx of suspected Sarcoidosis (mediastinal/hilar lymphadenopathy but no parenchymal disease on CT . Restrictive ventilatory defect and reduced DLCO onPFT ), never had tissue diagnosis. Repeat CT 10/4/18 showed reduction in mediastinal/hilar lymphadenopathy and bibasilar scarring. 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  
· HTN 
· DM 
· AFib · Anemia · Obesity RECOMMENDATIONS:  
CT chest reviewed: showed reduction in mediastinal and hilar lymphadenopathy. Bibasilar scarring. No upper lobe parenchymal scarring/fibrosis which is typical of sarcoidosis. No endobronchial lesions/mass. Mild blood tinged sputum resolved now. She has extensive past smoking hx but no obstructive defect on PFT . She is on albuterol neb and HFA prn which she is not needing to use. On 3 lit/min O2 at home. C/w O2. I am not sure why patient needed O2 at home looking at her echo, cardiac status and CT chest. She should have ambulatory pulse ox checked before discharge. C/w bronchodilator prn 
Consider holding ASA if hemoptysis worsening. Nuclear stress test negative. OK to proceed to colon mass surgery from pulmonary standpoint with mild-mod perioperative pulmonary complications/respiratory failure risk. FiO2 to keep SpO2 >=92%, HOB >=30 degree, aspiration precautions, aggressive pulmonary toileting, incentive spirometry, PT/OT eval and treat, mobilization. DVT Prophylaxis: protonix GI Prophylaxis: SCD Other issues management by primary team and respective consultants. Further recommendations will be based on the patient's response to recommended treatment and results of the investigation ordered. Quality Care: PPI, DVT prophylaxis, HOB elevated, infection control all reviewed and addressed. Discussed with patient and family - daughter at bedside, radiologic work up showed, answered all questions to their satisfaction. Care plan discussed with nursing, patient, daughter Will see intermittently. Call us with any questions. Subjective/History:  
 
Jean Carlos Polo is a 80 y.o. female with PMHx significant for HTN, DM, AFib, obesity, former smoker, ? COPD, restrictive ventilatory defect and reduced DLCO on PFT 2015, chronic hypoxic respiratory failure on home O2, mediastinal and hilar lymphadenopathy in 2015 with suspected sarcoidosis vs lymphoma who never had tissue diagnosis, admitted with syncope/anemia and found to have colon mass on w/u likely colon cancer. 10/5/18: For nuclear stress test today No fever chills dyspnea No more blood tinged sputum No overnight issues Immunization status:  
Immunization History Administered Date(s) Administered  Pneumococcal Vaccine (Unspecified Type) 01/01/2013 No Known Allergies Past Medical History:  
Diagnosis Date  Acid reflux  Arthritis  Atrial fibrillation ([address]  Autoimmune disease ([address] Sarcoidosis  Diabetes ([address]  High cholesterol  Hypertension  Sarcoidosis of lung ([address]  Stroke ([address] Past Surgical History: Procedure Laterality Date  COLONOSCOPY N/A 10/3/2018 COLONOSCOPY, POLYPECTOMY, BIOPSY, ENDOSCOPIC TATTOO performed by Marleni Bustos MD at [address] [place] [address] History reviewed. No pertinent family history. Social History Substance Use Topics  Smoking status: Former Smoker Packs/day: 2.50 Years: 30.00 Types: Cigarettes  Smokeless tobacco: Never Used  Alcohol use No  
  
 
Prior to Admission medications Medication Sig Start Date End Date Taking? Authorizing Provider  
simvastatin (ZOCOR) 20 mg tablet Take 20 mg by mouth nightly. Yes Historical Provider  
montelukast (SINGULAIR) 10 mg tablet Take 10 mg by mouth daily. Yes Historical Provider Brompheniramine-Pseudoeph-DM (DIMETAPP) 2-30-10 mg/5 mL syrup  6/15/16  Yes Historical Provider  
ranitidine (ZANTAC) 150 mg tablet Take 150 mg by mouth two (2) times a day. Yes Historical Provider  
furosemide (LASIX) 20 mg tablet Take  by mouth daily. Yes Historical Provider  
mometasone (NASONEX) 50 mcg/actuation nasal spray 2 Sprays daily. Yes Historical Provider  
ipratropium (ATROVENT) 0.02 % nebulizer solution 2.5 mL by Nebulization route four (4) times daily. 120 vials 8/10/16  Yes Azam De Paz MD  
albuterol (PROVENTIL VENTOLIN) 2.5 mg /3 mL (0.083 %) nebulizer solution 3 mL by Nebulization route four (4) times daily. 8/10/16  Yes Azam De Paz MD  
albuterol (PROVENTIL HFA, VENTOLIN HFA, PROAIR HFA) 90 mcg/actuation inhaler Take 1-2 Puffs by inhalation every six (6) hours as needed for Wheezing. 8/10/16  Yes Azam De Paz MD  
celecoxib (CELEBREX) 200 mg capsule  2/3/15  Yes Historical Provider  
glipiZIDE (GLUCOTROL) 5 mg tablet Take 5 mg by mouth two (2) times a day. Yes Historical Provider  
metFORMIN (GLUCOPHAGE) 500 mg tablet Take 500 mg by mouth daily (with breakfast). Yes Historical Provider pantoprazole (PROTONIX) 40 mg tablet Take 40 mg by mouth daily. Yes Phys Other, MD  
 
 
Current Facility-Administered Medications Medication Dose Route Frequency  simvastatin (ZOCOR) tablet 20 mg  20 mg Oral QHS  fluticasone (FLONASE) 50 mcg/actuation nasal spray 2 Spray  2 Spray Both Nostrils DAILY  ipratropium (ATROVENT) 0.02 % nebulizer solution 0.5 mg  0.5 mg Nebulization QID RT  
 metoclopramide HCl (REGLAN) injection 5 mg  5 mg IntraVENous Q6H PRN  pantoprazole (PROTONIX) tablet 40 mg  40 mg Oral ACB  polyethylene glycol (MIRALAX) packet 17 g  17 g Oral BID  albuterol (PROVENTIL HFA, VENTOLIN HFA, PROAIR HFA) inhaler 1-2 Puff  1-2 Puff Inhalation Q6H PRN  
 sodium chloride (NS) flush 5-10 mL  5-10 mL IntraVENous Q8H  
 sodium chloride (NS) flush 5-10 mL  5-10 mL IntraVENous PRN  
 aspirin (ASPIRIN) tablet 325 mg  325 mg Oral DAILY  insulin lispro (HUMALOG) injection   SubCUTAneous AC&HS  
 glucose chewable tablet 16 g  4 Tab Oral PRN  
 glucagon (GLUCAGEN) injection 1 mg  1 mg IntraMUSCular PRN  
 dextrose (D50W) injection syrg 12.5-25 g  25-50 mL IntraVENous PRN  
 atorvastatin (LIPITOR) tablet 80 mg  80 mg Oral DAILY  0.9% sodium chloride infusion 250 mL  250 mL IntraVENous PRN Objective:  
Vital Signs:   
Visit Vitals  /73 (BP 1 Location: Right arm, BP Patient Position: At rest)  Pulse 95  Temp 98.2 °F (36.8 °C)  Resp 18  Ht 5' 3\" (1.6 m)  Wt 93.4 kg (206 lb)  SpO2 98%  BMI 36.49 kg/m2 O2 Device: Nasal cannula O2 Flow Rate (L/min): 2 l/min Temp (24hrs), Av.3 °F (36.8 °C), Min:98 °F (36.7 °C), Max:98.7 °F (37.1 °C) Intake/Output:  
Last shift:      10/06 0701 - 10/06 1900 In: 240 [P.O.:240] Out: - Last 3 shifts: 10/04 1901 - 10/06 0700 In: 240 [P.O.:240] Out: 0 Intake/Output Summary (Last 24 hours) at 10/06/18 1151 Last data filed at 10/06/18 5836 Gross per 24 hour Intake              240 ml  
 Output                0 ml Net              240 ml Physical Exam:  
 
General/Neurology: Alert, Awake, NAD. Obese. O2 via NC. Head:   Normocephalic, without obvious abnormality, atraumatic. Neck:   Supple, symmetric. No carotid bruit. No lymphadenopathy. Trachea midline Lung: Moderate air entry bilateral equal. No rales. No rhonchi. No wheezing. No stridors. No prolongded expiration. No accessory muscle use. Heart:   Regular rate & rhythm. S1 S2 present. No murmur. No JVD. Abdomen:  Soft. NT. ND. Extremities:  No pedal edema. No cyanosis. No clubbing. Pulses: 2+ and symmetric in DP. Capillary refill: normal 
Lymphatic:  No cervical or supraclavicular palpable lymphadenopathy. Data:  
   
Recent Results (from the past 24 hour(s)) GLUCOSE, POC Collection Time: 10/05/18  3:51 PM  
Result Value Ref Range Glucose (POC) 131 (H) 70 - 110 mg/dL GLUCOSE, POC Collection Time: 10/05/18  4:38 PM  
Result Value Ref Range Glucose (POC) 144 (H) 70 - 110 mg/dL GLUCOSE, POC Collection Time: 10/05/18 11:21 PM  
Result Value Ref Range Glucose (POC) 211 (H) 70 - 110 mg/dL CBC WITH AUTOMATED DIFF Collection Time: 10/06/18  4:15 AM  
Result Value Ref Range WBC 5.6 4.6 - 13.2 K/uL  
 RBC 3.59 (L) 4.20 - 5.30 M/uL HGB 7.3 (L) 12.0 - 16.0 g/dL HCT 25.3 (L) 35.0 - 45.0 % MCV 70.5 (L) 74.0 - 97.0 FL  
 MCH 20.3 (L) 24.0 - 34.0 PG  
 MCHC 28.9 (L) 31.0 - 37.0 g/dL RDW 26.2 (H) 11.6 - 14.5 % PLATELET 024 231 - 274 K/uL MPV 9.1 (L) 9.2 - 11.8 FL  
 NEUTROPHILS 63 40 - 73 % LYMPHOCYTES 21 21 - 52 % MONOCYTES 13 (H) 3 - 10 % EOSINOPHILS 3 0 - 5 % BASOPHILS 0 0 - 2 %  
 ABS. NEUTROPHILS 3.6 1.8 - 8.0 K/UL  
 ABS. LYMPHOCYTES 1.2 0.9 - 3.6 K/UL  
 ABS. MONOCYTES 0.7 0.05 - 1.2 K/UL  
 ABS. EOSINOPHILS 0.2 0.0 - 0.4 K/UL  
 ABS. BASOPHILS 0.0 0.0 - 0.1 K/UL  
 DF AUTOMATED METABOLIC PANEL, BASIC Collection Time: 10/06/18  4:15 AM  
Result Value Ref Range Sodium 142 136 - 145 mmol/L Potassium 4.8 3.5 - 5.5 mmol/L Chloride 108 100 - 108 mmol/L  
 CO2 31 21 - 32 mmol/L Anion gap 3 3.0 - 18 mmol/L Glucose 107 (H) 74 - 99 mg/dL BUN 14 7.0 - 18 MG/DL Creatinine 1.23 0.6 - 1.3 MG/DL  
 BUN/Creatinine ratio 11 (L) 12 - 20 GFR est AA 50 (L) >60 ml/min/1.73m2 GFR est non-AA 41 (L) >60 ml/min/1.73m2 Calcium 8.5 8.5 - 10.1 MG/DL  
GLUCOSE, POC Collection Time: 10/06/18  6:49 AM  
Result Value Ref Range Glucose (POC) 131 (H) 70 - 110 mg/dL GLUCOSE, POC Collection Time: 10/06/18 11:38 AM  
Result Value Ref Range Glucose (POC) 159 (H) 70 - 110 mg/dL Chemistry Recent Labs 10/06/18 
 4196  10/04/18 
 9146 GLU  107*  98 NA  142  140  
K  4.8  4.1 CL  108  106 CO2  31  28 BUN  14  8 CREA  1.23  1.15  
CA  8.5  8.8 AGAP  3  6 BUCR  11*  7* Lactic Acid No results found for: LAC No results for input(s): LAC in the last 72 hours. Liver Enzymes Protein, total  
Date Value Ref Range Status 09/30/2018 7.6 6.4 - 8.2 g/dL Final  
 
Albumin Date Value Ref Range Status 09/30/2018 3.5 3.4 - 5.0 g/dL Final  
 
Globulin Date Value Ref Range Status 09/30/2018 4.1 (H) 2.0 - 4.0 g/dL Final  
 
A-G Ratio Date Value Ref Range Status 09/30/2018 0.9 0.8 - 1.7   Final  
 
AST (SGOT) Date Value Ref Range Status 09/30/2018 9 (L) 15 - 37 U/L Final  
 
Alk. phosphatase Date Value Ref Range Status 09/30/2018 47 45 - 117 U/L Final  
 
No results for input(s): TP, ALB, GLOB, AGRAT, SGOT, GPT, AP, TBIL in the last 72 hours. No lab exists for component: DBIL  
 
CBC w/Diff Recent Labs 10/06/18 
 6577  10/04/18 
 5325 WBC  5.6  7.1  
RBC  3.59*  3.93* HGB  7.3*  7.9*  
HCT  25.3*  27.3*  
PLT  224  257 GRANS  63   --   
LYMPH  21   --   
EOS  3   --   
  
 
Cardiac Enzymes No results found for: CPK, CK, CKMMB, CKMB, RCK3, CKMBT, CKNDX, CKND1, YESENIA, TROPT, TROIQ, RENY, TROPT, TNIPOC, BNP, BNPP  
 
 BNP No results found for: BNP, BNPP, XBNPT Coagulation No results for input(s): PTP, INR, APTT in the last 72 hours. No lab exists for component: INREXT, INREXT Thyroid  No results found for: T4, T3U, TSH, TSHEXT, TSHEXT No results found for: T4  
 
Urinalysis Lab Results Component Value Date/Time Color YELLOW 10/01/2018 01:50 AM  
 Appearance CLEAR 10/01/2018 01:50 AM  
 Specific gravity 1.017 10/01/2018 01:50 AM  
 pH (UA) 5.0 10/01/2018 01:50 AM  
 Protein NEGATIVE  10/01/2018 01:50 AM  
 Glucose NEGATIVE  10/01/2018 01:50 AM  
 Ketone NEGATIVE  10/01/2018 01:50 AM  
 Bilirubin NEGATIVE  10/01/2018 01:50 AM  
 Urobilinogen 0.2 10/01/2018 01:50 AM  
 Nitrites NEGATIVE  10/01/2018 01:50 AM  
 Leukocyte Esterase NEGATIVE  10/01/2018 01:50 AM  
 Epithelial cells FEW 10/01/2018 01:50 AM  
 Bacteria FEW (A) 10/01/2018 01:50 AM  
 WBC 0 to 2 10/01/2018 01:50 AM  
 RBC NEGATIVE  10/01/2018 01:50 AM  
  
 
Micro  No results for input(s): SDES, CULT in the last 72 hours. No results for input(s): CULT in the last 72 hours. ABG No results for input(s): PHI, PHI, POC2, PCO2I, PO2, PO2I, HCO3, HCO3I, FIO2, FIO2I in the last 72 hours. PFT 4/14/15 PFTs 4/14/15 Effort good FLOWS: 
FEV1/FVC ratio is preserved      
Maximal Mid Expiratory Flow is decreased FEV1 and FVC are decreased     
Pre bronchodilator FEV1 is 0.77 L (51% predicted) Pre bronchodilator FVC is 0.90 L (46% predicted)    
 
Volumes: 
TLC, RV and VC are decreased TLC is 55% predicted    
 
Flow Volume Loop: 
Nonspecific restrictive pattern in Flow Volume Loop    
 
Bronchodilator: No bronchodilator improvement seen 
   
Diffusion: 
Diffusion Capacity - decreased at 11% predicted Airway resistance: Normal 
 
Impression: 
Predominantly moderate restrictive defect seen. An additional airflow obstructive defect cannot be ruled out.  
There was no bronchodilator response.   
 Severe decrease in uncorrected diffusion capacity  - could be due 
to anemia and/or pulmonary parenchymal disease and/or pulmonary 
vascular disease.  The decreased diffusion capacity is out of 
proportion to the decreased flows and lung volumes; hence 
recommend evaluation for pulmonary hypertension and interstitial 
lung disease. Please correlate clinically. Absence of bronchodilation on study does not exclude 
bronchodilator therapy.   
 
 
Forrest Freire MD  
 
Ultrasound LE Doppler ECHO 9/30/18 · Estimated left ventricular ejection fraction is 66 - 70%. Left ventricular mild concentric hypertrophy. Normal left ventricular wall motion, no regional wall motion abnormality noted. Mild (grade 1) left ventricular diastolic dysfunction E/E' ratio is 12. Kian Quiet · Mitral valve non-specific thickening. Mild mitral valve regurgitation. · Mild tricuspid valve regurgitation is present. Pulmonary arterial systolic pressure is 35 mmHg. Mild pulmonary hypertension is present. · Mild pulmonic valve regurgitation is present. · Saline contrast was given to evaluate for intracardiac shunt. Right to left shunt seen at rest.  
 
 
CT chest 2/21/15: 
1. There is no pulmonary embolism or aortic dissection. 2. Pulmonary arterial hypertension which may be due to pulmonary artery 
compression from the substantial and severe mediastinal and hilar 
lymphadenopathy. These lymph nodes are very amenable to endobronchial ultrasound 
biopsy. Differential considerations would include sarcoidosis (the favored 
diagnosis), however lymphoma must also be considered. This lymphadenopathy may 
also be compressing some of the airways leading to the atelectasis seen in the 
lung bases. CT (Most Recent) (CT chest reviewed by me) Results from Hospital Encounter encounter on 09/30/18 CT CHEST WO CONT Narrative EXAM: CT chest  
 
INDICATION: Shortness of breath. Sarcoidosis. COMPARISON: CTA thorax 2/21/2015 TECHNIQUE: Axial CT imaging from the thoracic inlet through the diaphragm with 
intravenous contrast. Multiplanar reformats were generated. One or more dose 
reduction techniques were used on this CT: automated exposure control, 
adjustment of the mAs and/or kVp according to patient size, and iterative 
reconstruction techniques. The specific techniques used on this CT exam have 
been documented in the patient's electronic medical record. 
 
_______________ FINDINGS: 
 
LUNGS: Bandlike fibrosis bilateral lower lobes and lingula. Paraseptal emphysema 
right apex. PLEURA: Normal. 
 
AIRWAY: Normal. 
 
MEDIASTINUM: Mild cardiomegaly. No pericardial effusion. Dilated main pulmonary 
artery relative to adjacent ascending aorta compatible with pulmonary 
hypertension. LYMPH NODES: ATS 4R adenopathy measuring about 1.4 cm short axis with prior 
measurement 2.5 cm. ATS 3B adenopathy 1 cm with prior measurement 2 cm. ATS 7 
subcarinal adenopathy 1.1 cm with prior measurement 2.5 cm. These findings 
represent a very significant improvement since prior CT. 
 
OTHER: No acute or aggressive osseous abnormalities identified. _______________ Impression IMPRESSION: 
 
 
1. Bandlike fibrosis bilateral lower lobes and lingula without change. No acute 
pulmonary process. 2. Mild cardiomegaly. Pulmonary hypertension again evident. 3. Significant improvement in mediastinal adenopathy since prior study. No 
definitive hilar adenopathy remaining. XR (Most Recent). CXR  reviewed by me and compared with previous CXR Results from Hospital Encounter encounter on 09/30/18 XR CHEST PORT Narrative EXAM: XR CHEST PORT 
 
CLINICAL INDICATION/HISTORY: CVA >Additional: None COMPARISON: Chest x-ray most recently 6/14/2015 >Reference exam: Chest CT from 2/21/2015 TECHNIQUE: Portable chest. 
 
_______________ FINDINGS: 
 
SUPPORT LINES AND TUBES: None. HEART AND MEDIASTINUM: The heart is partially obscured due to the low lung 
volumes but grossly stable in size and contour. Bilateral hilar prominence in 
keeping with adenopathy seen to better advantage on prior CT from February 2015. LUNGS AND PLEURAL SPACES: The lungs remain underexpanded with basilar volume 
loss. Band of subsegmental atelectasis in the left perihilar region is noted. BONY THORAX AND SOFT TISSUES: The bones and soft tissues are within normal 
limits. _______________ Impression IMPRESSION: 
 
1. Hypoventilatory changes and bibasilar volume loss, similar to the prior 
examination. No acute cardiopulmonary abnormality or significant interval 
change. MRI brain 9/30/18: 
1. Possible tiny area of acute right parietal infarction, evident on diffusion 
only without hemorrhage or mass effect. 2. Moderately prominent ischemic white matter change. Ischemic changes in the 
thalami. 3. Chronic left frontal parietal and small right frontal cortical/subcortical 
infarctions. Numerous chronic bilateral cerebellar hemisphere infarctions. Kush Newsome MD 
10/6/2018

## 2018-10-06 NOTE — PROGRESS NOTES
0800: Bedside and Verbal shift change report given to Edie Herrera, RN (oncoming nurse) by Harjit Dick Rn (offgoing nurse). Report included the following information SBAR, ED Summary, Recent Results and Cardiac Rhythm NSR BBB. 
 
0800: Assessment completed. Freq neuro assessment completed. A&Ox4. NSR. On 2L NC. Lung sounds clear and dim in bases. Bowel sounds active. Denies any pain. +2 pitting edema noted to BLE.   
 
1200: Reassessment completed. No changes from previous. Daughter has been at bedside. 1600: Reassessment completed. No changes from previous. Family at bedside. 1920: Bedside and Verbal shift change report given to Harjit Dick, RN (oncoming nurse) by Edie Herrera RN (offgoing nurse).  Report included the following information SBAR, Recent Results and Cardiac Rhythm SR.

## 2018-10-07 NOTE — ROUTINE PROCESS
Bedside and Verbal shift change report given to [facility] Tova George (oncoming nurse) by Mariana Devine. Sylvia Sharma (offgoing nurse). Report included the following information SBAR, Kardex, Intake/Output, MAR, Cardiac Rhythm NSR and Alarm Parameters .

## 2018-10-07 NOTE — PROGRESS NOTES
8063: Received report from Jami. RAOUL BLAND. Assumed pt care at this time. 0935: Assessment completed. Patient is A&OX4. Patient is calm and cooperative. Family at bedside. Patient PERRLA, oral mucosa pink and moist, strong  on both upper extremities. Lung sound clear, diminished, on 2L NC.  not appear distress. Bowel sounds active. Pedal pulses are palpable, no complain of any numbness or tingling. IV site dry and dressing intact. Stated no Pain. Cardiac monitor 4, running Tachycardia. bed is in lower position, call bell and personal items are within reach. educated pt to call when getting up to prevent fall. Pt verbalized understanding. 1115: Paged Dr Chary Heck at this time about pt's Asprin administration. 1144: Notified Dr Carolina Montiel about pt's BG was 335. Give the insulin per Protocol. He said not sure about the Asprin dosage, will continue to contact Dr Chary Heck. 1214: Dr Chary Heck called back, notified her pt's surgery planning is on Monday. She said that it was fine to given Asprin. Shift summary: Pt had an uneventful shift, stated no Pain. No abnormality noted to the neuro check. Lung sound clear, dimished, on O2 2L. bowel sounds active, tolerate diet well. Able to ambulate to the bathroom with one person assist, voiding without difficulty. Denied any [place] and chest pain, pleasant the whole shift, family at bedside.

## 2018-10-07 NOTE — PROGRESS NOTES
Problem: Mobility Impaired (Adult and Pediatric) Goal: *Acute Goals and Plan of Care (Insert Text) Physical Therapy Goals Initiated 10/1/2018 and to be accomplished within 7 day(s) 1. Patient will move from supine <> sit with S in prep for out of bed activity and change of position. 2.  Patient will perform sit<> stand with S/RW in prep for transfers/ambulation. 3.  Patient will transfer from bed <> chair with S/RW for time up in chair for completion of ADL activity. 4.  Patient will ambulate 100 feet with S/RW without LOB/path deviatons for improved functional mobility/qait/safe discharge. Reviewed and updated 10/6/2018 and to be accomplished within 7 day(s) 1. Patient will move from supine to sit and sit to supine  in bed with modified independence. 2.  Patient will transfer from bed to chair and chair to bed with modified independence using the least restrictive device. 3.  Patient will perform sit to stand with modified independence. 4.  Patient will ambulate with modified independence for 300 feet with the least restrictive device. 5.  Patient will ascend/descend 1 stairs without handrail(s) with supervision. Outcome: Progressing Towards Goal 
physical Therapy TREATMENT Patient: Denny Mckeon (16 y.o. female) Date: 10/7/2018 Diagnosis: Stroke ([address] 
anemia  Syncope Procedure(s) (LRB): 
COLONOSCOPY, POLYPECTOMY, BIOPSY, ENDOSCOPIC TATTOO (N/A) 4 Days Post-Op Precautions: Fall, Aspiration, Skin Chart, physical therapy assessment, plan of care and goals were reviewed. ASSESSMENT: 
Pt demonstrated good safety and stability with ambulation. Pt does continue to require 3-4 rest breaks due to fatigue during ambulation. Pt planned for surgery on 10/8/18. Will continue to assess for most appropriate discharge recommendations. Progression toward goals: 
[x]      Improving appropriately and progressing toward goals 
[]      Improving slowly and progressing toward goals []      Not making progress toward goals and plan of care will be adjusted PLAN: 
Patient continues to benefit from skilled intervention to address the above impairments. Continue treatment per established plan of care. Discharge Recommendations:  Rehab vs home health; pending progress following surgery planned from 10/8/18 Further Equipment Recommendations for Discharge:  TBD SUBJECTIVE:  
Patient stated I am doing ok.  OBJECTIVE DATA SUMMARY:  
Critical Behavior: 
Neurologic State: Alert, Appropriate for age Orientation Level: Appropriate for age, Oriented X4 Cognition: Appropriate decision making, Appropriate for age attention/concentration, Appropriate safety awareness, Follows commands Safety/Judgement: Decreased insight into deficits, Decreased awareness of need for safety, Decreased awareness of need for assistance Functional Mobility Training: 
Bed Mobility: 
Supine to Sit: Supervision Sit to Supine: Supervision Transfers: 
Sit to Stand: Stand-by assistance Stand to Sit: Stand-by assistance Balance: 
Sitting: Intact Standing: Intact; With support Ambulation/Gait Training: 
Distance (ft): 200 Feet (ft) (c/3-4 standing rest breaks) Assistive Device: Gait belt;Walker, rolling Ambulation - Level of Assistance: Stand-by assistance Gait Abnormalities: Decreased step clearance Base of Support: Widened Speed/Peggy: Slow Step Length: Right shortened;Left shortened Pain: 
Pain Scale 1: Numeric (0 - 10) Pain Intensity 1: 0 Activity Tolerance:  
Good Please refer to the flowsheet for vital signs taken during this treatment. After treatment:  
[x] Patient left in no apparent distress sitting up in chair 
[] Patient left in no apparent distress in bed 
[x] Call bell left within reach [x] Nursing notified 
[] Caregiver present 
[] Bed alarm activated Andrea Rajput Time Calculation: 23 mins

## 2018-10-07 NOTE — PROGRESS NOTES
Hospitalist Progress Note Patient: Leah Godinez MRN: [id number]  CSN: [id number] YOB: 1932  Age: 80 y.o. Sex: female DOA: 9/30/2018 LOS:  LOS: 7 days Chief Complaint:colonic mass Assessment/Plan Disposition : 
Patient Active Problem List  
Diagnosis Code  Dyspnea R06.00  Troponin level elevated R74.8  Wheezing R06.2  Sarcoidosis D86.9  Diabetes ([facility] E11.9  Hypoxia R09.02  Stroke ([facility] I63.9  Syncope R55  Chronic respiratory failure with hypoxia, on home O2 therapy (HCC) J96.11, Z99.81  
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  
 Colonic mass K63.9  
 
79 y/o female admitted with severe anemia and colonic mass. Severe anemia.  
Colon mass. NIDDM. COPD. Chronic respiratory failure on home O2. 
 
-Follow hgb. -Surgery tomorrow.  
-Probably colon cancer. -DVT px. Subjective: 
 
Feeling well. Optimistic about surgery. Review of systems: 
 
Constitutional: denies fevers, chills, myalgias Respiratory: denies SOB, cough Cardiovascular: denies chest pain, palpitations Gastrointestinal: denies nausea, vomiting, diarrhea Vital signs/Intake and Output: 
Visit Vitals  /57 (BP 1 Location: Right arm, BP Patient Position: At rest)  Pulse 88  Temp 98.1 °F (36.7 °C)  Resp 18  Ht 5' 3\" (1.6 m)  Wt 92.5 kg (203 lb 14.4 oz)  SpO2 100%  BMI 36.12 kg/m2 Current Shift:  10/07 0701 - 10/07 1900 In: 480 [P.O.:480] Out: - Last three shifts:  10/05 1901 - 10/07 0700 In: 720 [P.O.:720] Out: - Exam: 
 
General: Well developed, alert, NAD, OX3 Head/Neck: NCAT, supple, No masses, No lymphadenopathy CVS:Regular rate and rhythm, no M/R/G, S1/S2 heard, no thrill Lungs:Clear to auscultation bilaterally, no wheezes, rhonchi, or rales Abdomen: Soft, Nontender, No distention, Normal Bowel sounds, No hepatomegaly Extremities: No edema. Labs: Results:  
   
Chemistry Recent Labs 10/07/18 
 0554  10/06/18 
 2872 GLU  132*  107* NA  143  142  
K  4.7  4.8  
CL  106  108 CO2  32  31 BUN  12  14 CREA  1.35*  1.23  
CA  8.8  8.5 AGAP  5  3 BUCR  9*  11* AP  43*   --   
TP  6.3*   --   
ALB  2.8*   --   
GLOB  3.5   --   
AGRAT  0.8   --   
  
CBC w/Diff Recent Labs 10/07/18 
 6537  10/06/18 
 2519 WBC  5.8  5.6  
RBC  3.68*  3.59* HGB  7.3*  7.3* HCT  25.9*  25.3*  
PLT  221  224 GRANS  67  63 LYMPH  20*  21 EOS  4  3 Cardiac Enzymes No results for input(s): CPK, CKND1, YESENIA in the last 72 hours. No lab exists for component: Jenniehernandez Ziegler Coagulation No results for input(s): PTP, INR, APTT in the last 72 hours. No lab exists for component: INREXT Lipid Panel Lab Results Component Value Date/Time Cholesterol, total 175 09/30/2018 10:00 AM  
 HDL Cholesterol 81 (H) 09/30/2018 10:00 AM  
 LDL, calculated 70 09/30/2018 10:00 AM  
 VLDL, calculated 24 09/30/2018 10:00 AM  
 Triglyceride 120 09/30/2018 10:00 AM  
 CHOL/HDL Ratio 2.2 09/30/2018 10:00 AM  
  
BNP No results for input(s): BNPP in the last 72 hours. Liver Enzymes Recent Labs 10/07/18 
 8073 TP  6.3* ALB  2.8* AP  43* SGOT  10* Thyroid Studies No results found for: T4, T3U, TSH, TSHEXT Procedures/imaging: see electronic medical records for all procedures/Xrays and details which were not copied into this note but were reviewed prior to creation of Plan Mariano Khoury MD

## 2018-10-07 NOTE — PROGRESS NOTES
Problem: Falls - Risk of 
Goal: *Absence of Falls Document Mauricio Avitia Fall Risk and appropriate interventions in the flowsheet. Outcome: Progressing Towards Goal 
Fall Risk Interventions: 
Mobility Interventions: Patient to call before getting OOB, PT Consult for mobility concerns, PT Consult for assist device competence, Strengthening exercises (ROM-active/passive), Utilize walker, cane, or other assistive device Mentation Interventions: Adequate sleep, hydration, pain control, Increase mobility Medication Interventions: Assess postural VS orthostatic hypotension, Patient to call before getting OOB, Teach patient to arise slowly Elimination Interventions: Call light in reach, Patient to call for help with toileting needs, Toilet paper/wipes in reach, Toileting schedule/hourly rounds History of Falls Interventions: Door open when patient unattended

## 2018-10-07 NOTE — PROGRESS NOTES
Nutrition Brief: 
 
ERAS initiated with patient. Explained to patient and family members ERAS protocol and provided handout with instructions. Spoke to CNA also regarding ERAS protocol. Immune therapy drink consumed prior to 10:30 AM (noted diet order placed for clear liquid diet 10:30 AM) Then 2 Clear liquid pre surgery drinks this afternoon/tonight And 1 Clear liquid pre surgery drinks tomorrow no more than 2 hours prior to surgery. Pt requesting to mix with crystal light raspberry lemonade. Immune therapy drinks to continue after surgery as soon as okay per MD. 
 
Marion Day RDN Pager [phone]

## 2018-10-07 NOTE — PROGRESS NOTES
Shift Progress Note: Assumed care of patient in bed awake and quiet with family @ bedside. Uneventful night VSS. Remains on 2 lpm by nasal cannula. Call jorge within reach. Jaron Miners remains @ bedside thru out night. Patient Vitals for the past 12 hrs: 
 Temp Pulse Resp BP SpO2  
10/07/18 0400 97.9 °F (36.6 °C) 92 16 100/46 100 % 10/07/18 0000 98.7 °F (37.1 °C) 89 16 112/51 100 % 10/06/18 2357 98.7 °F (37.1 °C) 89 16 112/51 100 % 10/06/18 2107 98.2 °F (36.8 °C) 90 16 107/49 100 % 10/06/18 2000 98.2 °F (36.8 °C) 90 16 107/49 -

## 2018-10-07 NOTE — ROUTINE PROCESS
Bedside and Verbal shift change report given to Jose Ferro (oncoming nurse) by Alaina Solo RN 
 (offgoing nurse). Report included the following information SBAR, Kardex, Intake/Output, MAR and Recent Results.

## 2018-10-08 NOTE — PROGRESS NOTES
Problem: Pressure Injury - Risk of 
Goal: *Prevention of pressure injury Document Tavares Scale and appropriate interventions in the flowsheet. Outcome: Progressing Towards Goal 
Pressure Injury Interventions: 
Sensory Interventions: Assess changes in LOC Moisture Interventions: Absorbent underpads, Check for incontinence Q2 hours and as needed, Maintain skin hydration (lotion/cream) Activity Interventions: Pressure redistribution bed/mattress(bed type) Mobility Interventions: Pressure redistribution bed/mattress (bed type), HOB 30 degrees or less Nutrition Interventions: Document food/fluid/supplement intake Friction and Shear Interventions: HOB 30 degrees or less

## 2018-10-08 NOTE — PROGRESS NOTES
Hospitalist Progress Note Patient: Suki Berger MRN: [id number]  CSN: [id number] YOB: 1932  Age: 80 y.o. Sex: female DOA: 9/30/2018 LOS:  LOS: 8 days Chief Complaint: 
 
GI bleed and colon mass Assessment/Plan Ac blood loss anemia, severe anemia Colonic mass discovered on scope here GI bleed resolved Hx of CVA Chronic resp failure with sarcoidosis, on home 02 Diabetes, controlled, type 2, non insulin dependent For surgery today, LAR of mass Transfuse one unit PRBC now as Hgb 7.3, have second on hold for surgery NPO status Discussed with family Risk moderate for surgery at her age and chronic medical issues They are motivated to move forward and anticiptae that this will likely be a uphill road to recovery and there are unknowns about extent of tumor/cancer at thsi time still 
lexiscan performed last week for pre-op eval showed no ischemia, she does have diastolic grade 1 dysfunction and Pulm HTN Hold BG SSI coverage unless BG over 200-discussed with nurse Continue cardiac monitoring CBC and BMP in am 
 
Disposition : 
Patient Active Problem List  
Diagnosis Code  Dyspnea R06.00  Troponin level elevated R74.8  Wheezing R06.2  Sarcoidosis D86.9  Diabetes ([facility] E11.9  Hypoxia R09.02  Stroke ([facility] I63.9  Syncope R55  Chronic respiratory failure with hypoxia, on home O2 therapy (HCC) J96.11, Z99.81  
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  
 Colonic mass K63.9 Subjective: She denies abd pain Loose watery stool after prep for procedure Denies N/V Not anxious No CP,or SOB No cough Review of systems: 
 
Constitutional: denies fevers, chills, myalgias Respiratory: denies SOB Cardiovascular: denies chest pain, palpitations Gastrointestinal: denies nausea, vomiting, diarrhea Vital signs/Intake and Output: 
Visit Vitals  /64 (BP 1 Location: Left arm, BP Patient Position: At rest)  Pulse 100  Temp 98.3 °F (36.8 °C)  Resp 18  Ht 5' 3\" (1.6 m)  Wt 92.5 kg (203 lb 14.4 oz)  SpO2 100%  BMI 36.12 kg/m2 Current Shift:    
Last three shifts:  10/06 1901 - 10/08 0700 In: 4314 [P.O.:1440; I.V.:20] Out: 0 Exam: 
 
General: elderly obese BF alert, NAD, OX3 Head/Neck: NCAT, supple, No masses, No lymphadenopathy CVS:Regular rate and rhythm, no M/R/G, S1/S2 heard, no thrill Lungs:Clear to auscultation bilaterally, no wheezes, rhonchi, or rales Abdomen: Soft, Nontender, No distention, Normal Bowel sounds, No hepatomegaly Extremities: No C/C/E, pulses palpable 2+ Neuro:grossly normal , follows commands Psych:appropriate Labs: Results:  
   
Chemistry Recent Labs 10/08/18 
 1981  10/07/18 
 0073  10/06/18 
 2455 GLU  121*  132*  107* NA  142  143  142  
K  4.6  4.7  4.8  
CL  105  106  108 CO2  30  32  31 BUN  10  12  14 CREA  1.21  1.35*  1.23  
CA  8.8  8.8  8.5 AGAP  7  5  3 BUCR  8*  9*  11* AP   --   43*   --   
TP   --   6.3*   --   
ALB   --   2.8*   --   
GLOB   --   3.5   --   
AGRAT   --   0.8   --   
  
CBC w/Diff Recent Labs 10/08/18 
 5678  10/07/18 
 1118  10/06/18 
 3910 WBC  5.6  5.8  5.6  
RBC  3.69*  3.68*  3.59* HGB  7.3*  7.3*  7.3* HCT  26.2*  25.9*  25.3*  
PLT  241  221  224 GRANS  62  67  63 LYMPH  22  20*  21 EOS  4  4  3 Cardiac Enzymes No results for input(s): CPK, CKND1, YESENIA in the last 72 hours. No lab exists for component: Meriam Burdock Coagulation No results for input(s): PTP, INR, APTT in the last 72 hours. No lab exists for component: INREXT Lipid Panel Lab Results Component Value Date/Time  Cholesterol, total 175 09/30/2018 10:00 AM  
 HDL Cholesterol 81 (H) 09/30/2018 10:00 AM  
 LDL, calculated 70 09/30/2018 10:00 AM  
 VLDL, calculated 24 09/30/2018 10:00 AM  
 Triglyceride 120 09/30/2018 10:00 AM  
 CHOL/HDL Ratio 2.2 09/30/2018 10:00 AM  
  
BNP No results for input(s): BNPP in the last 72 hours. Liver Enzymes Recent Labs 10/07/18 
 6553 TP  6.3* ALB  2.8* AP  43* SGOT  10* Thyroid Studies No results found for: T4, T3U, TSH, TSHEXT Procedures/imaging: see electronic medical records for all procedures/Xrays and details which were not copied into this note but were reviewed prior to creation of Plan Vianey Finney MD

## 2018-10-08 NOTE — PROGRESS NOTES
Problem: Falls - Risk of 
Goal: *Absence of Falls Document Amanda Aquino Fall Risk and appropriate interventions in the flowsheet. Outcome: Progressing Towards Goal 
Fall Risk Interventions: 
Mobility Interventions: Patient to call before getting OOB Mentation Interventions: Adequate sleep, hydration, pain control, Door open when patient unattended Medication Interventions: Patient to call before getting OOB, Teach patient to arise slowly Elimination Interventions: Call light in reach History of Falls Interventions: Door open when patient unattended

## 2018-10-08 NOTE — PROGRESS NOTES
Problem: Pressure Injury - Risk of 
Goal: *Prevention of pressure injury Document Tavares Scale and appropriate interventions in the flowsheet. Outcome: Progressing Towards Goal 
Pressure Injury Interventions: 
Sensory Interventions: Assess changes in LOC Moisture Interventions: Absorbent underpads Activity Interventions: Increase time out of bed, PT/OT evaluation, Pressure redistribution bed/mattress(bed type) Mobility Interventions: HOB 30 degrees or less, Pressure redistribution bed/mattress (bed type), PT/OT evaluation Nutrition Interventions: Document food/fluid/supplement intake Friction and Shear Interventions: HOB 30 degrees or less Problem: Falls - Risk of 
Goal: *Absence of Falls Document Johan Leonardo Fall Risk and appropriate interventions in the flowsheet. Outcome: Progressing Towards Goal 
Fall Risk Interventions: 
Mobility Interventions: Utilize walker, cane, or other assistive device, PT Consult for assist device competence, PT Consult for mobility concerns, Patient to call before getting OOB Mentation Interventions: Adequate sleep, hydration, pain control, Door open when patient unattended Medication Interventions: Assess postural VS orthostatic hypotension, Patient to call before getting OOB, Teach patient to arise slowly Elimination Interventions: Elevated toilet seat, Call light in reach, Toileting schedule/hourly rounds History of Falls Interventions: Door open when patient unattended

## 2018-10-08 NOTE — ROUTINE PROCESS
Bedside and Verbal shift change report given to Lakeisha Ovalles RN (oncoming nurse) by Natasha Villaseñor RN (offgoing nurse). Report included the following information SBAR, Kardex and ED Summary

## 2018-10-08 NOTE — PROGRESS NOTES
Transition of care patient had been accepted to acute rehab however, that was on Friday and patient having surgery today will wait unit after surgery and have clearer objectives for this patient. Patient lives with daughter and Patients daughter states she [facility] [place] [phone]. CMS will place referral. if not able to go to [facility] at [address], [facility]. Patient does wear home oxygen at 2 LPM. Cm will continue to follow Care Management Interventions PCP Verified by CM: Yes Mode of Transport at Discharge: BLS Transition of Care Consult (CM Consult):  (acute rehab) Physical Therapy Consult: Yes Occupational Therapy Consult: Yes Current Support Network: Other Confirm Follow Up Transport: Family Plan discussed with Pt/Family/Caregiver: Yes Freedom of Choice Offered: Yes Discharge Location Discharge Placement: Rehab Unit Subacute

## 2018-10-08 NOTE — PROGRESS NOTES
1808 [facility] care of pt at this time. Pt in chair with no signs of distress. Pt left with call light within reach and encouraged to call for assistance. 0845 Head to toe assessment completed at this time  Patient is A&OX4. Pt denies N/V chest pain and SOB or distress. Pt is calm and cooperative. Pedal pulses are present. Capillary refill less than 3 seconds. Skin is warm, dry and intact. Lungs are clear bilaterally, pt is on 2 l of oxygen. Bowel sounds are active. SCDS in place bilaterally . Positive dorsalis pedis pulse, sensation, warm. 22 g needle in the Lt arm. Pain scale explained to patient. Pain level is 0. Patient was oriented to call bell and bed function. Will continue to monitor [address] with Dr Tom Garcia regarding Insulin as pt is NPO . she stated hold the insulin unless it is above 200 
 
1138 blood transfusion started at this time and informed about side effect and verbalize standing 1150 pt mews is 3 because pt is anaemic and weak so getting Blood transfusion. 1500 pt is resting Quietly on Recliner, family at bedside, no complain and concern at this time. 1700 pt is off the floor for surgery. Called 4 times to pre op to give report no one answer the call. 1935  shift change report given to Sofia Velasquez (oncoming nurse) by Derick ANGUIANO RN (offgoing nurse). Report included the following information SBAR, Kardex and MAR.

## 2018-10-08 NOTE — PROGRESS NOTES
Problem: Mobility Impaired (Adult and Pediatric) Goal: *Acute Goals and Plan of Care (Insert Text) Physical Therapy Goals Initiated 10/1/2018 and to be accomplished within 7 day(s) 1. Patient will move from supine <> sit with S in prep for out of bed activity and change of position. 2.  Patient will perform sit<> stand with S/RW in prep for transfers/ambulation. 3.  Patient will transfer from bed <> chair with S/RW for time up in chair for completion of ADL activity. 4.  Patient will ambulate 100 feet with S/RW without LOB/path deviatons for improved functional mobility/qait/safe discharge. Reviewed and updated 10/6/2018 and to be accomplished within 7 day(s) 1. Patient will move from supine to sit and sit to supine  in bed with modified independence. 2.  Patient will transfer from bed to chair and chair to bed with modified independence using the least restrictive device. 3.  Patient will perform sit to stand with modified independence. 4.  Patient will ambulate with modified independence for 300 feet with the least restrictive device. 5.  Patient will ascend/descend 1 stairs without handrail(s) with supervision. Outcome: Progressing Towards Goal 
physical Therapy TREATMENT Patient: Nion Foreman (6 y.o. female) Date: 10/8/2018 Diagnosis: Stroke ([address] 
anemia BLEEDING OF COLON Syncope Procedure(s) (LRB): 
RIGHT COLECTOMY (N/A) Day of Surgery Precautions: Fall, Aspiration, Skin Chart, physical therapy assessment, plan of care and goals were reviewed. ASSESSMENT: 
Pt up in chair on PT arrival.  Demonstrates transfers with CGA and gt with RW/SBA 100ft with 3 rest breaks. Peggy decreased and O2 in place at 2 Lnc and sats remain 100% during session. Pt returned to room and initially left in chair, however, request use of bathroom and daughter assisted pt with same. Pt for surgery this afternoon and will resume PT as appropriate. Progression toward goals: [x]      Improving appropriately and progressing toward goals 
[]      Improving slowly and progressing toward goals 
[]      Not making progress toward goals and plan of care will be adjusted PLAN: 
Patient continues to benefit from skilled intervention to address the above impairments. Continue treatment per established plan of care. Discharge Recommendations:  Home Health Further Equipment Recommendations for Discharge:  N/A  
 
SUBJECTIVE:  
Patient stated . OBJECTIVE DATA SUMMARY:  
Critical Behavior: 
Neurologic State: Alert, Appropriate for age Orientation Level: Oriented X4 Cognition: Follows commands, Appropriate for age attention/concentration, Appropriate decision making, Appropriate safety awareness Safety/Judgement: Decreased insight into deficits, Decreased awareness of need for safety, Decreased awareness of need for assistance Functional Mobility Training: 
Transfers: 
Sit to Stand: Contact guard assistance Stand to Sit: Contact guard assistance Balance: 
Sitting: Intact Sitting - Static: Good (unsupported) Sitting - Dynamic: Good (unsupported) Standing: Intact; With support Standing - Static: Good Standing - Dynamic : Fair Ambulation/Gait Training: 
Distance (ft): 100 Feet (ft) Assistive Device: Gait belt;Walker, rolling Ambulation - Level of Assistance: Stand-by assistance Gait Abnormalities: Decreased step clearance Base of Support: Narrowed Speed/Peggy: Pace decreased (<100 feet/min) Step Length: Right shortened;Left shortened Interventions: Safety awareness training;Verbal cues Pain: 
Pain Scale 1: Numeric (0 - 10) Pain Intensity 1: 0 Activity Tolerance:  
Fair Please refer to the flowsheet for vital signs taken during this treatment. After treatment:  
[x] Patient left in no apparent distress in bathroom with daughter assisting 
[] Patient left in no apparent distress in bed 
[x] Call bell left within reach [x] Nursing notified 
[] Caregiver present [] Bed alarm activated Cecilia Lee, PT Time Calculation: 22 mins

## 2018-10-08 NOTE — INTERVAL H&P NOTE
H&P Update: 
Ciarra Conley was seen and examined. History and physical has been reviewed. The patient has been examined. There have been no significant clinical changes since the completion of the originally dated History and Physical. 
Patient identified by surgeon; surgical site was confirmed by patient and surgeon. CEA 2.0. Will do open to decrease anesthesia time due to severe and numerous cardiopulmonary issues. ICU bed ordered for post op Signed By: Inna Swanson DO  October 8, 2018 7:16 PM

## 2018-10-08 NOTE — PROGRESS NOTES
Problem: Neurolinguistics Impaired (Adult) Goal: *Acute Goals and Plan of Care (Insert Text) Cognitive-linguistic goals:  
Patient will: 1. Complete functional working memory tasks, utilizing compensatory internal and external techniques with 80% Luis Alberto Holts 2. Recall basic auditory information with 80% Luis Alberto Holts 3. Complete deductive reasoning/logic tasks with 80% Luis Alberto Holts 4. Complete compare/contrast activities with 80% acc. 100 Medical Kenneth 5. Complete visuospatial tasks with 80% acc. 100 Medical Kenneth Outcome: Progressing Towards Goal 
Speech language pathology treatment Patient: Min Hodge (38 y.o. female) Date: 10/8/2018 Diagnosis: Stroke ([facility] [address] 
anemia BLEEDING OF COLON Syncope Procedure(s) (LRB): 
COLONOSCOPY, POLYPECTOMY, BIOPSY, ENDOSCOPIC TATTOO (N/A) 5 Days Post-Op Precautions: Aspiration PLOF: Living with family ASSESSMENT: 
Followed up this day with cognitive ST treatment. Patient A&Ox3, multiple family members at bedside. Areas of deficit include visuospatial/executive functioning, abstraction and delayed recall. Patient continues to be unaware of errors and deficits. Delayed recall task completed with 25% acc. utilizing visualization strategies with maxA. Patient completed compare/contrast tasks with 50% acc, maxA. Patient would benefit from continued ST upon D/C from current facility to address cognitive deficits.  
  
Patient NPO for surgery, dysphagia tx not completed this day. 
  
Progression toward goals: 
[]       Improving appropriately and progressing toward goals [x]       Improving slowly and progressing toward goals 
[]       Not making progress toward goals and plan of care will be adjusted PLAN: 
Patient continues to benefit from skilled intervention to address the above impairments. Continue treatment per established plan of care. Discharge Recommendations:  Rehab SUBJECTIVE:  
Patient stated Can I have food after surgery? . OBJECTIVE:  
Mental Status: Neurologic State: Alert, Appropriate for age Orientation Level: Oriented X4 Cognition: Follows commands, Appropriate for age attention/concentration, Appropriate decision making, Appropriate safety awareness Perception: Tactile, Verbal, Visual 
Perseveration: Perseverates during conversation Safety/Judgement: Decreased insight into deficits, Decreased awareness of need for safety, Decreased awareness of need for assistance Treatment & Interventions:  
Language Comprehension and Expression: 
  
Verbal Expression Response & Tolerance to Activities: 
  Good PAIN: 
Start of Tx: 0 End of Tx: 0 After treatment:  
[x]       Patient left in no apparent distress sitting up in chair 
[]       Patient left in no apparent distress in bed 
[x]       Call bell left within reach [x]       Nursing notified 
[x]       Caregiver present 
[]       Bed alarm activated COMMUNICATION/EDUCATION:  
[x]             Compensatory speech/language/comprehension techniques FROILAN Mcneill Time Calculation: 18 mins

## 2018-10-08 NOTE — ANESTHESIA PREPROCEDURE EVALUATION
Anesthetic History No history of anesthetic complications Review of Systems / Medical History Patient summary reviewed, nursing notes reviewed and pertinent labs reviewed Pulmonary Within defined limits Neuro/Psych CVA TIA Cardiovascular Hypertension Dysrhythmias : atrial fibrillation GI/Hepatic/Renal 
  
GERD Endo/Other Diabetes Arthritis Other Findings Physical Exam 
 
Airway Mallampati: II 
TM Distance: 4 - 6 cm Neck ROM: normal range of motion Mouth opening: Normal 
 
 Cardiovascular Regular rate and rhythm,  S1 and S2 normal,  no murmur, click, rub, or gallop Dental 
 
Dentition: Full upper dentures and Lower partial plate Pulmonary Breath sounds clear to auscultation Abdominal 
GI exam deferred Other Findings Anesthetic Plan ASA: 3 Anesthesia type: general 
 
 
 
 
Induction: Intravenous Anesthetic plan and risks discussed with: Patient and Family Plan GETA w/ routine monitors. ICU post-op. Possibility of central line, A-line and post-op ventilation discussed w/ pt and pt's family. Risks accepted.

## 2018-10-08 NOTE — PERIOP NOTES
TRANSFER - IN REPORT: 
 
Verbal report received from Montserrat(name) on Min Moder  being received from 3(unit) for ordered procedure Report consisted of patients Situation, Background, Assessment and  
Recommendations(SBAR). Information from the following report(s) was reviewed with the receiving nurse. Opportunity for questions and clarification was provided. Assessment completed upon patients arrival to unit and care assumed.

## 2018-10-08 NOTE — ROUTINE PROCESS
TRANSFER - OUT REPORT: 
 
Verbal report given to [facility]) on [address]  being transferred to Pre Op(unit) for ordered procedure Report consisted of patients Situation, Background, Assessment and  
Recommendations(SBAR). Information from the following report(s) SBAR, Kardex, Intake/Output, MAR and Accordion was reviewed with the receiving nurse. Lines:  
Peripheral IV 10/08/18 Left Arm (Active) Site Assessment Clean, dry, & intact 10/8/2018  5:31 PM  
Phlebitis Assessment 0 10/8/2018  5:31 PM  
Infiltration Assessment 0 10/8/2018  5:31 PM  
Dressing Status Clean, dry, & intact 10/8/2018  5:31 PM  
Dressing Type Tape;Transparent 10/8/2018  5:31 PM  
Hub Color/Line Status Blue; Infusing 10/8/2018  5:31 PM  
Action Taken Open ports on tubing capped 10/8/2018  8:45 AM  
Alcohol Cap Used Yes 10/8/2018  8:45 AM  
  
 
Opportunity for questions and clarification was provided. Patient transported with: 
 Monitor O2 @ 2 liters

## 2018-10-08 NOTE — H&P (VIEW-ONLY)
Consult Note Patient: Shilpi Omalley MRN: [id number]  CSN: [id number] YOB: 1932  Age: 80 y.o. Sex: female DOA: 9/30/2018 LOS:  LOS: 4 days Requesting Physician: Dr Marlene Schrader and Dr. Fabi Chapin Reason for Consultation: colon cancer HPI:  
 
Shilpi Omalley is a 80 y.o. female who has been seen for GI bleed. Had colonoscopy and found to have mass in ascending colon. Awaiting biopsies and CEA. CT scan did not show metastatic disease. Past Medical History:  
Diagnosis Date  Acid reflux  Arthritis  Atrial fibrillation ([facility] [address]  [facility] Sarcoidosis  Diabetes ([facility]  High cholesterol  Hypertension  Sarcoidosis of lung ([facility]  Stroke ([facility] Past Surgical History:  
Procedure Laterality Date  COLONOSCOPY N/A 10/3/2018 COLONOSCOPY, POLYPECTOMY, BIOPSY, ENDOSCOPIC TATTOO performed by Contreras Jain MD at [place] [address] History reviewed. No pertinent family history. Social History Social History  Marital status: UNKNOWN Spouse name: N/A  
 Number of children: N/A  
 Years of education: N/A Social History Main Topics  Smoking status: Former Smoker Packs/day: 2.50 Years: 30.00 Types: Cigarettes  Smokeless tobacco: Never Used  Alcohol use No  
 Drug use: No  
 Sexual activity: Not Asked Other Topics Concern  None Social History Narrative Prior to Admission medications Medication Sig Start Date End Date Taking? Authorizing Provider  
simvastatin (ZOCOR) 20 mg tablet Take 20 mg by mouth nightly. Yes Historical Provider  
montelukast (SINGULAIR) 10 mg tablet Take 10 mg by mouth daily. Yes Historical Provider Brompheniramine-Pseudoeph-DM (DIMETAPP) 2-30-10 mg/5 mL syrup  6/15/16  Yes Historical Provider  
ranitidine (ZANTAC) 150 mg tablet Take 150 mg by mouth two (2) times a day. Yes Historical Provider furosemide (LASIX) 20 mg tablet Take  by mouth daily. Yes Historical Provider  
mometasone (NASONEX) 50 mcg/actuation nasal spray 2 Sprays daily. Yes Historical Provider  
ipratropium (ATROVENT) 0.02 % nebulizer solution 2.5 mL by Nebulization route four (4) times daily. 120 vials 8/10/16  Yes Cindi Segovia MD  
albuterol (PROVENTIL VENTOLIN) 2.5 mg /3 mL (0.083 %) nebulizer solution 3 mL by Nebulization route four (4) times daily. 8/10/16  Yes Cindi Segovia MD  
albuterol (PROVENTIL HFA, VENTOLIN HFA, PROAIR HFA) 90 mcg/actuation inhaler Take 1-2 Puffs by inhalation every six (6) hours as needed for Wheezing. 8/10/16  Yes Cindi Segovia MD  
celecoxib (CELEBREX) 200 mg capsule  2/3/15  Yes Historical Provider  
glipiZIDE (GLUCOTROL) 5 mg tablet Take 5 mg by mouth two (2) times a day. Yes Historical Provider  
metFORMIN (GLUCOPHAGE) 500 mg tablet Take 500 mg by mouth daily (with breakfast). Yes Historical Provider  
pantoprazole (PROTONIX) 40 mg tablet Take 40 mg by mouth daily. Yes Misael Rivers MD  
 
 
No Known Allergies Review of Systems Pertinent items are noted in the History of Present Illness. Physical Exam:  
  
Visit Vitals  /50 (BP 1 Location: Right arm, BP Patient Position: At rest)  Pulse 98  Temp 98.7 °F (37.1 °C)  Resp 18  Ht 5' 3\" (1.6 m)  Wt 92.4 kg (203 lb 9.6 oz)  SpO2 98%  BMI 36.07 kg/m2 Physical Exam: 
Physical Exam:  
General:  Alert, cooperative, no distress, appears stated age. Eyes:  Conjunctivae/corneas clear. PERRL, EOMs intact. Fundi benign Ears:  Normal TMs and external ear canals both ears. Nose: Nares normal. Septum midline. Mucosa normal. No drainage or sinus tenderness. Mouth/Throat: Lips, mucosa, and tongue normal. Teeth and gums normal.  
Neck: Supple, symmetrical, trachea midline, no adenopathy, thyroid: no enlargement/tenderness/nodules, no carotid bruit and no JVD. Back:   Symmetric, no curvature. ROM normal. No CVA tenderness. Lungs:   Clear to auscultation bilaterally. Heart:  Regular rate and rhythm, S1, S2 normal, no murmur, click, rub or gallop. Abdomen:   Soft, non-tender. Bowel sounds normal. No masses,  No organomegaly. Extremities: Extremities normal, atraumatic, no cyanosis or edema. Pulses: 2+ and symmetric all extremities. Skin: Skin color, texture, turgor normal. No rashes or lesions Lymph nodes: Cervical, supraclavicular, and axillary nodes normal.  
Neurologic: CNII-XII intact. Normal strength, sensation and reflexes throughout. Labs Reviewed: 
BMP:  
Lab Results Component Value Date/Time  10/04/2018 03:26 AM  
 K 4.1 10/04/2018 03:26 AM  
  10/04/2018 03:26 AM  
 CO2 28 10/04/2018 03:26 AM  
 AGAP 6 10/04/2018 03:26 AM  
 GLU 98 10/04/2018 03:26 AM  
 BUN 8 10/04/2018 03:26 AM  
 CREA 1.15 10/04/2018 03:26 AM  
 GFRAA 54 (L) 10/04/2018 03:26 AM  
 GFRNA 45 (L) 10/04/2018 03:26 AM  
 
CMP:  
Lab Results Component Value Date/Time  10/04/2018 03:26 AM  
 K 4.1 10/04/2018 03:26 AM  
  10/04/2018 03:26 AM  
 CO2 28 10/04/2018 03:26 AM  
 AGAP 6 10/04/2018 03:26 AM  
 GLU 98 10/04/2018 03:26 AM  
 BUN 8 10/04/2018 03:26 AM  
 CREA 1.15 10/04/2018 03:26 AM  
 GFRAA 54 (L) 10/04/2018 03:26 AM  
 GFRNA 45 (L) 10/04/2018 03:26 AM  
 CA 8.8 10/04/2018 03:26 AM  
 
CBC:  
Lab Results Component Value Date/Time WBC 7.1 10/04/2018 03:26 AM  
 HGB 7.9 (L) 10/04/2018 03:26 AM  
 HCT 27.3 (L) 10/04/2018 03:26 AM  
  10/04/2018 03:26 AM  
 
All Cardiac Markers in the last 24 hours:  
Lab Results Component Value Date/Time  CPK 58 10/04/2018 03:26 AM  
 CKMB <1.0 10/04/2018 03:26 AM  
 CKND1  10/04/2018 03:26 AM  
  CALCULATION NOT PERFORMED WHEN RESULT IS BELOW LINEAR LIMIT  
 TROIQ <0.02 10/04/2018 03:26 AM  
 
COAGS: No results found for: APTT, PTP, INR 
 Liver Panel: No results found for: ALB, CBIL, TBIL, TP, GLOB, AGRAT, SGOT, ASTPOC, ALTPOC, ALT, GPT, AP Assessment/Plan Principal Problem: 
  Syncope (9/30/2018) Active Problems: 
  Sarcoidosis (2/21/2015) Hypoxia (2/22/2015) Stroke [facility]) (9/30/2018) Chronic respiratory failure with hypoxia, on home O2 therapy ([address] (9/30/2018) HTN (hypertension) (9/30/2018) Type II diabetes mellitus, uncontrolled ([address] (9/30/2018) Severe anemia (9/30/2018) Sarcoidosis of lung ([address] (10/4/2018) Colonic mass (10/4/2018) CT images viewed. Plan for pt to have eval and clearance by cardiology and pulmonary. Continue nutrition. Will check with OR schedule for resection hopefully during this hospital stay. Mariela Carrillo, DO 
10/4/2018

## 2018-10-08 NOTE — PROGRESS NOTES
Called by pharmacy today that antibiotic doses that I ordered for bowel prep yesterday were not administered by nursing or pharmacy. Will give 2 doses this AM and proceed due to bleeding cancer which needs to be controlled and pt has co-morbidities that may not withstand significant delays. [address] notified. Nathaly Hauser,  
10/8/2018

## 2018-10-09 NOTE — PROGRESS NOTES
Initial assessment completed. Sedated w/ Diprivan . Vented w/ spontaneous resp on 35% fi02. Monitor shows nsr Goal is to extubate this am when Dr Charles Bonner comes in. Afebrile. Daughters @ bedside & explained the goal for today. 1000- Dr Jericho Bello visited w/ new orders. Diprivan d/c. 
 
4590- Extubated & placed placed on high flow by resp, 
 
1030- Nursing bedside swallow done & tolerated well. 1100- On clear liquid diet . 1200- Awake , alert & oriented x3. Tolerating high flow. Blood sugar low=m 40 & dextose 50% 26 grams given per protocol. VS stable. Afebrile. Family remained @ bedside. No further c/o of abd, pain after medicated w/ fentanyl 25 mg. 
 
1400- Mackenzie w/ fair urine output. 1600- Assessment no changed. VS stable. No sob. Afebrile. Monitor no changed. 1800- Condition no changed during the shift. No sob. Medicated for pain prn w/ relief. Family @ bedside all shift. Abd , dressings reinforced w/ same old drainage as marked. 1915- Bedside and Verbal shift change report given to [address] [place] [address] (oncoming nurse) by Loki Craft RN (offgoing nurse). Report included the following information SBAR, Kardex, ED Summary, OR Summary, Procedure Summary, Intake/Output, MAR and Recent Results.

## 2018-10-09 NOTE — PROGRESS NOTES
NUTRITION FOLLOW-UP 
 
RECOMMENDATIONS/PLAN:  
-d/c Commercial beverages: glucerna shake and magic cup Adv diet per MD 
Avoid prolonged Clear liquids as it does not meet pt needs Monitor labs/lytes, PO intake/diet advancement, skin integrity, BM 
NUTRITION ASSESSMENT:  
Client Update: 80 yrs old Female with severe anemia s/p colonic mass resection admitted to ICU on vent, extubated-10/9/18, respiratory failure-post op, colon mass-probable cancer, NIDDM, COPD, chronic respiratory failure FOOD/NUTRITION INTAKE Diet Order:  Clear liquid Supplements: Glucerna Shake-TID, Magic Cup-TID Food Allergies: NKFA Average PO Intake:     
Patient Vitals for the past 100 hrs: 
 % Diet Eaten 10/08/18 0630 0 % 10/07/18 1550 100 % 10/07/18 1336 100 % 10/07/18 0943 90 % 10/06/18 1816 100 % 10/06/18 1203 100 % 10/06/18 0819 90 % 10/05/18 1151 50 % 10/05/18 0600 0 % Pertinent Medications:  [x] Reviewed; lipitor, lasix, reglan, protonix, miralax, propofol Electrolyte Replacement Protocol: []K []Mg []PO4 Insulin:  []SSI  [x]Pre-meal   []Basal    []Drip  []None Cultural/Hinduism Food Preferences: None Identified BIOCHEMICAL DATA & MEDICAL TESTS Pertinent Labs:  [x] Reviewed; creatinine: 1.4, GFR: 36, Bilirubin: 1.4, protein: 5.9, albumin: 2.6 ANTHROPOMETRICS Height: 5' 3\" (160 cm)       Weight: 92 kg (202 lb 13.2 oz) BMI: 35.9 kg/m^2 obese (30%-39.9% BMI) Adm Weight: 206 lbs                Weight change: -4 lb; significant Adjusted Body Weight:  62.2 kg NUTRITION-FOCUSED PHYSICAL ASSESSMENT Skin: No PU     
GI: +BM: 10/8/18 NUTRITION PRESCRIPTION Calories: 9198-9674 kcal/day based on 25-30 kcal/kg ABW Protein: 81 g/day based on 1.3 g/kg ABW Fluid: 4999-6084 ml/day based on 1 kcal/ml NUTRITION DIAGNOSES:  
1. Inadequate oral intake related to colon/rectal surgery as evidenced by clear liquid diet NUTRITION INTERVENTIONS:  
INTERVENTIONS:        GOALS: 
 1. d/c Commercial beverages: glucerna shake and magic cup 1. d/c Commercial beverages: glucerna shake and magic cup by next review 3 days 2. Adv diet per MD 2. Adv diet per MD by next review 3 days LEARNING NEEDS (Diet, Supplementation, Food/Nutrient-Drug Interaction): 
 [x] None Identified   [] Education provided/documented      Identified and patient: [] Declined   [] Was not appropriate/indicated NUTRITION MONITORING /EVALUATION:  
Follow PO intake Monitor wt Monitor renal labs, electrolytes, fluid status Monitor for additional supplement needs Previous Recommendations Implemented: yes Previous Goals Met:  yes - 
   
[] Participated in Interdisciplinary Rounds   
[x] Interdisciplinary Care Plan Reviewed DISCHARGE NUTRITION RECOMMENDATIONS ADDRESSED:  
  [x] To be determined closer to discharge NUTRITION RISK:           [x] At risk                        [] Not currently at risk Will follow-up per policy. Lalita Cleary, Dietetic Intern [phone]

## 2018-10-09 NOTE — PROGRESS NOTES
Attempted to get ABG before extubation,two daughters in room requested stop no more sticks,very rude about it,Dr jerry.

## 2018-10-09 NOTE — PROGRESS NOTES
RESPIRATORY NOTE: 
Pt extubated uneventfully, tolerating HFNC well,  Set up per Dr. Quang Knight X 24 hours, will continue to monitor.

## 2018-10-09 NOTE — PROGRESS NOTES
[facility] Pulmonary, Critical Care, and Sleep Medicine Name: Shilpi Omalley MRN: [id number] : 1932 Hospital: [facility] [place] Date: 10/9/2018 [facility]:  
 
Shilpi Omalley is a 80 y.o. female with PMHx significant for HTN, DM, AFib, obesity, former smoker, COPD, restrictive ventilatory defect and reduced DLCO on PFT  due to chronic atelectasis of RT middle and LT lingula lobes; chronic hypoxic respiratory failure on home O2 at 2L, chronic mediastinal and hilar lymphadenopathy-suspected sarcoidosis, but no tissue diagnosis due to old age. She was admitted with syncope/anemia and found to have colon mass on w/u likely colon cancer. 10/9/18: 
S/p surgery yesterday evening - right colectomy. Patient was left intubated due to respiratory risks. Patient on vent, sedated with propofol. Stable hemodynamics; not on pressors  cc overnight No Known Allergies Past Medical History:  
Diagnosis Date  Acid reflux  Arthritis  Atrial fibrillation ([address]  Autoimmune disease ([address] Sarcoidosis  Diabetes ([address]  High cholesterol  Hypertension  Sarcoidosis of lung ([address]  Stroke ([address] Past Surgical History:  
Procedure Laterality Date  COLONOSCOPY N/A 10/3/2018 COLONOSCOPY, POLYPECTOMY, BIOPSY, ENDOSCOPIC TATTOO performed by Contreras Jain MD at [place] [address] Social History Substance Use Topics  Smoking status: Former Smoker Packs/day: 2.50 Years: 30.00 Types: Cigarettes  Smokeless tobacco: Never Used  Alcohol use No  
  
 
Current Facility-Administered Medications Medication Dose Route Frequency  sodium chloride (NS) flush 5-10 mL  5-10 mL IntraVENous PRN  
 albuterol (PROVENTIL VENTOLIN) nebulizer solution 2.5 mg  2.5 mg Inhalation PRN  
  fentaNYL citrate (PF) injection 25 mcg  25 mcg IntraVENous PRN  
 HYDROmorphone (PF) (DILAUDID) injection 0.5 mg  0.5 mg IntraVENous Multiple  naloxone (NARCAN) injection 0.2 mg  0.2 mg IntraVENous PRN  
 flumazenil (ROMAZICON) 0.1 mg/mL injection 0.2 mg  0.2 mg IntraVENous Multiple  promethazine (PHENERGAN) with saline injection 12.5 mg  12.5 mg IntraVENous ONCE  
 diphenhydrAMINE (BENADRYL) injection 12.5 mg  12.5 mg IntraVENous Multiple  insulin lispro (HUMALOG) injection   SubCUTAneous ONCE  
 glucose chewable tablet 16 g  4 Tab Oral PRN  
 glucagon (GLUCAGEN) injection 1 mg  1 mg IntraMUSCular PRN  
 dextrose (D50W) injection syrg 12.5-25 g  25-50 mL IntraVENous PRN  
 insulin lispro (HUMALOG) injection   SubCUTAneous Q6H  
 0.9% sodium chloride infusion 250 mL  250 mL IntraVENous PRN  
 lactated Ringers infusion  125 mL/hr IntraVENous CONTINUOUS  propofol (DIPRIVAN) infusion  0-50 mcg/kg/min IntraVENous TITRATE  simvastatin (ZOCOR) tablet 20 mg  20 mg Oral QHS  fluticasone (FLONASE) 50 mcg/actuation nasal spray 2 Spray  2 Spray Both Nostrils DAILY  ipratropium (ATROVENT) 0.02 % nebulizer solution 0.5 mg  0.5 mg Nebulization QID RT  
 metoclopramide HCl (REGLAN) injection 5 mg  5 mg IntraVENous Q6H PRN  pantoprazole (PROTONIX) tablet 40 mg  40 mg Oral ACB  polyethylene glycol (MIRALAX) packet 17 g  17 g Oral BID  albuterol (PROVENTIL HFA, VENTOLIN HFA, PROAIR HFA) inhaler 1-2 Puff  1-2 Puff Inhalation Q6H PRN  
 sodium chloride (NS) flush 5-10 mL  5-10 mL IntraVENous Q8H  
 sodium chloride (NS) flush 5-10 mL  5-10 mL IntraVENous PRN  
 aspirin (ASPIRIN) tablet 325 mg  325 mg Oral DAILY  glucose chewable tablet 16 g  4 Tab Oral PRN  
 glucagon (GLUCAGEN) injection 1 mg  1 mg IntraMUSCular PRN  
 dextrose (D50W) injection syrg 12.5-25 g  25-50 mL IntraVENous PRN  
 atorvastatin (LIPITOR) tablet 80 mg  80 mg Oral DAILY  0.9% sodium chloride infusion 250 mL  250 mL IntraVENous PRN  
 
ROS - limited due to patient condition Objective:  
Vital Signs:   
Visit Vitals  /70  Pulse 87  Temp 98.8 °F (37.1 °C)  Resp 14  
 Ht 5' 3\" (1.6 m)  Wt 92 kg (202 lb 13.2 oz)  SpO2 100%  BMI 35.93 kg/m2 O2 Device: Endotracheal tube, Ventilator O2 Flow Rate (L/min): 2 l/min Temp (24hrs), Av.3 °F (36.8 °C), Min:97.4 °F (36.3 °C), Max:98.8 °F (37.1 °C) Intake/Output:  
Last shift:        
Last 3 shifts: 10/07 1901 - 10/09 07 In: 4402.3 [I.V.:4402.3] Out: 510 [Urine:410] Intake/Output Summary (Last 24 hours) at 10/09/18 0830 Last data filed at 10/09/18 0600 Gross per 24 hour Intake          4382.32 ml Output              510 ml Net          3872.32 ml ABG:   
Lab Results Component Value Date/Time PHI 7.468 (H) 10/09/2018 05:27 AM  
 PCO2I 40.5 10/09/2018 05:27 AM  
 PO2I 128 (H) 10/09/2018 05:27 AM  
 HCO3I 29.3 (H) 10/09/2018 05:27 AM  
 FIO2I 0.45 10/09/2018 05:27 AM  
 
Ventilator Mode: Assist control Respiratory Rate Back-Up Rate: 14 Insp Time (sec): 0.9 sec I:E Ratio: 1:3.3 Ventilator Volumes Vt Set (ml): 400 ml Vt Exhaled (Machine Breath) (ml): 431 ml Ve Observed (l/min): 6.06 l/min Ventilator Pressures PIP Observed (cm H2O): 17 cm H2O Plateau Pressure (cm H2O): 16 cm H2O 
MAP (cm H2O): 8 PEEP/VENT (cm H2O): 5 cm H20 Physical Exam:  
Comfortable; on vent support; acyanotic HEENT: pupils not dilated, reactive, no scleral jaundice Neck: No adenopathy or thyroid swelling CVS: S1S2 no murmurs; JVD not elevated RS: Mod air entry bilaterally, decreased BS at bases, no wheezes or crackles Abd: soft, non tender, no hepatosplenomegaly, no abd distension, no guarding or rigidity, bowel sounds heard Laparotomy dressing with no bleeding Neuro: sedated with propofol; limited exam 
Extrm: no leg edema or swelling or clubbing Skin: no rash Lymphatic: no cervical or supraclavicular adenopathy 
  
 
Data:  
   
Recent Results (from the past 24 hour(s)) TYPE + CROSSMATCH Collection Time: 10/08/18  9:15 AM  
Result Value Ref Range Crossmatch Expiration 10/11/2018 ABO/Rh(D) A POSITIVE Antibody screen NEG   
 CALLED TO:    
  1 UNIT READY TO VALORIE RN 3N AT 1105 ON 10/08/18 [facility] Unit number [id number] Blood component type RC LR Unit division 00 Status of unit ISSUED Crossmatch result Compatible GLUCOSE, POC Collection Time: 10/08/18 11:02 AM  
Result Value Ref Range Glucose (POC) 165 (H) 70 - 110 mg/dL EKG, 12 LEAD, SUBSEQUENT Collection Time: 10/08/18  1:11 PM  
Result Value Ref Range Ventricular Rate 99 BPM  
 Atrial Rate 99 BPM  
 P-R Interval 154 ms QRS Duration 116 ms  
 Q-T Interval 358 ms QTC Calculation (Bezet) 459 ms Calculated P Axis 51 degrees Calculated R Axis 103 degrees Calculated T Axis 39 degrees Diagnosis Normal sinus rhythm Right bundle branch block Abnormal ECG When compared with ECG of 30-SEP-2018 10:19, 
premature supraventricular complexes are no longer present Right bundle branch block is now present HGB & HCT Collection Time: 10/08/18  4:36 PM  
Result Value Ref Range HGB 9.4 (L) 12.0 - 16.0 g/dL HCT 31.6 (L) 35.0 - 45.0 % GLUCOSE, POC Collection Time: 10/08/18  4:51 PM  
Result Value Ref Range Glucose (POC) 146 (H) 70 - 110 mg/dL GLUCOSE, POC Collection Time: 10/08/18  5:30 PM  
Result Value Ref Range Glucose (POC) 130 (H) 70 - 110 mg/dL GLUCOSE, POC Collection Time: 10/09/18 12:00 AM  
Result Value Ref Range Glucose (POC) 198 (H) 70 - 110 mg/dL POC G3 Collection Time: 10/09/18 12:04 AM  
Result Value Ref Range Device: VENT    
 FIO2 (POC) 0.50 %  pH (POC) 7.467 (H) 7.35 - 7.45    
 pCO2 (POC) 40.1 35.0 - 45.0 MMHG  
 pO2 (POC) 102 (H) 80 - 100 MMHG  
 HCO3 (POC) 29.2 (H) 22 - 26 MMOL/L  
 sO2 (POC) 98 (H) 92 - 97 % Base excess (POC) 5 mmol/L Mode ASSIST CONTROL Tidal volume 400 ml Set Rate 14 bpm  
 PEEP/CPAP (POC) 5 cmH2O Allens test (POC) YES Inspiratory Time 1 sec Total resp. rate 14 Site LEFT RADIAL Patient temp. 97.0 Specimen type (POC) ARTERIAL Performed by Harsh Reese Volume control plus YES    
METABOLIC PANEL, COMPREHENSIVE Collection Time: 10/09/18 12:20 AM  
Result Value Ref Range Sodium 141 136 - 145 mmol/L Potassium 4.5 3.5 - 5.5 mmol/L Chloride 106 100 - 108 mmol/L  
 CO2 30 21 - 32 mmol/L Anion gap 5 3.0 - 18 mmol/L Glucose 181 (H) 74 - 99 mg/dL BUN 14 7.0 - 18 MG/DL Creatinine 1.40 (H) 0.6 - 1.3 MG/DL  
 BUN/Creatinine ratio 10 (L) 12 - 20 GFR est AA 43 (L) >60 ml/min/1.73m2 GFR est non-AA 36 (L) >60 ml/min/1.73m2 Calcium 8.5 8.5 - 10.1 MG/DL Bilirubin, total 1.4 (H) 0.2 - 1.0 MG/DL  
 ALT (SGPT) 22 13 - 56 U/L  
 AST (SGOT) 30 15 - 37 U/L Alk. phosphatase 49 45 - 117 U/L Protein, total 5.9 (L) 6.4 - 8.2 g/dL Albumin 2.6 (L) 3.4 - 5.0 g/dL Globulin 3.3 2.0 - 4.0 g/dL A-G Ratio 0.8 0.8 - 1.7    
CBC W/O DIFF Collection Time: 10/09/18 12:20 AM  
Result Value Ref Range WBC 8.9 4.6 - 13.2 K/uL  
 RBC 4.35 4.20 - 5.30 M/uL HGB 9.3 (L) 12.0 - 16.0 g/dL HCT 31.6 (L) 35.0 - 45.0 % MCV 72.6 (L) 74.0 - 97.0 FL  
 MCH 21.4 (L) 24.0 - 34.0 PG  
 MCHC 29.4 (L) 31.0 - 37.0 g/dL RDW 26.8 (H) 11.6 - 14.5 % PLATELET 140 223 - 174 K/uL MPV 9.1 (L) 9.2 - 11.8 FL  
POC G3 Collection Time: 10/09/18  5:27 AM  
Result Value Ref Range Device: VENT    
 FIO2 (POC) 0.45 % pH (POC) 7.468 (H) 7.35 - 7.45    
 pCO2 (POC) 40.5 35.0 - 45.0 MMHG  
 pO2 (POC) 128 (H) 80 - 100 MMHG  
 HCO3 (POC) 29.3 (H) 22 - 26 MMOL/L  
 sO2 (POC) 99 (H) 92 - 97 % Base excess (POC) 6 mmol/L Mode ASSIST CONTROL Tidal volume 400 ml  Set Rate 14 bpm  
 PEEP/CPAP (POC) 5 cmH2O  
 PIP (POC) 18 Allens test (POC) N/A Inspiratory Time 1 sec Total resp. rate 14 Site RIGHT RADIAL Patient temp. 98.4 Specimen type (POC) ARTERIAL Performed by Ren Hylton Volume control plus YES    
GLUCOSE, POC Collection Time: 10/09/18  5:42 AM  
Result Value Ref Range Glucose (POC) 179 (H) 70 - 110 mg/dL Chemistry Recent Labs 10/09/18 
 0020  10/08/18 
 5600  10/07/18 
 4803 GLU  181*  121*  132* NA  141  142  143  
K  4.5  4.6  4.7 CL  106  105  106 CO2  30  30  32 BUN  14  10  12 CREA  1.40*  1.21  1.35* CA  8.5  8.8  8.8 AGAP  5  7  5 BUCR  10*  8*  9* AP  49   --   43* TP  5.9*   --   6.3* ALB  2.6*   --   2.8*  
GLOB  3.3   --   3.5 AGRAT  0.8   --   0.8 Lactic Acid No results found for: LAC No results for input(s): LAC in the last 72 hours. Liver Enzymes Recent Labs 10/09/18 
 0020  10/07/18 
 6593 TP  5.9*  6.3* ALB  2.6*  2.8*  
GLOB  3.3  3.5 AGRAT  0.8  0.8 SGOT  30  10* AP  49  43* CBC w/Diff Recent Labs 10/09/18 
 0020  10/08/18 
 1636  10/08/18 
 0359  10/07/18 
 8367 WBC  8.9   --   5.6  5.8  
RBC  4.35   --   3.69*  3.68* HGB  9.3*  9.4*  7.3*  7.3* HCT  31.6*  31.6*  26.2*  25.9*  
PLT  220   --   241  221 GRANS   --    --   62  67 LYMPH   --    --   22  20* EOS   --    --   4  4 Cardiac Enzymes No results found for: CPK, CK, CKMMB, CKMB, RCK3, CKMBT, CKNDX, CKND1, YESENIA, TROPT, TROIQ, RENY, TROPT, TNIPOC, BNP, BNPP Micro  No results for input(s): SDES, CULT in the last 72 hours. No results for input(s): CULT in the last 72 hours. ABG Recent Labs 10/09/18 
 0527  10/09/18 
 0004 PHI  7.468*  7.467* PCO2I  40.5  40.1 PO2I  128*  102* HCO3I  29.3*  29.2*  
FIO2I  0.45  0.50 PFT  PFTs 4/14/15 FLOWS: 
FEV1/FVC ratio is preserved        
Pre bronchodilator FEV1 is 0.77 L (51% predicted) Pre bronchodilator FVC is 0.90 L (46% predicted)    
Volumes: 
TLC, RV and VC are decreased TLC is 55% predicted    
Bronchodilator: No bronchodilator improvement seen Diffusion: 
Diffusion Capacity - decreased at 11% predicted Impression: 
Predominantly moderate restrictive defect seen. An additional airflow obstructive defect cannot be ruled out. There was no bronchodilator response.   
Severe decrease in uncorrected diffusion capacity    
 
 
ECHO 9/30/18 Estimated left ventricular ejection fraction is 66 - 70%. Left ventricular mild concentric hypertrophy. Normal left ventricular wall motion, no regional wall motion abnormality noted. Mild (grade 1) left ventricular diastolic dysfunction E/E' ratio is 12. Flores Cooter Mitral valve non-specific thickening. Mild mitral valve regurgitation. Mild tricuspid valve regurgitation is present. Pulmonary arterial systolic pressure is 35 mmHg. Mild pulmonary hypertension is present. Mild pulmonic valve regurgitation is present. Saline contrast was given to evaluate for intracardiac shunt. Right to left shunt seen at rest.  
 
 
CT chest 10/4/18 Results from Hospital Encounter encounter on 09/30/18 CT CHEST WO CONT Narrative EXAM: CT chest  
 
INDICATION: Shortness of breath. Sarcoidosis. COMPARISON: CTA thorax 2/21/2015 TECHNIQUE: Axial CT imaging from the thoracic inlet through the diaphragm with 
intravenous contrast. Multiplanar reformats were generated. One or more dose 
reduction techniques were used on this CT: automated exposure control, 
adjustment of the mAs and/or kVp according to patient size, and iterative 
reconstruction techniques. The specific techniques used on this CT exam have 
been documented in the patient's electronic medical record. 
 
_______________ FINDINGS: 
 
LUNGS: Bandlike fibrosis bilateral lower lobes and lingula. Paraseptal emphysema 
right apex.  
 
PLEURA: Normal. 
 
AIRWAY: Normal. 
 
 MEDIASTINUM: Mild cardiomegaly. No pericardial effusion. Dilated main pulmonary 
artery relative to adjacent ascending aorta compatible with pulmonary 
hypertension. LYMPH NODES: ATS 4R adenopathy measuring about 1.4 cm short axis with prior 
measurement 2.5 cm. ATS 3B adenopathy 1 cm with prior measurement 2 cm. ATS 7 
subcarinal adenopathy 1.1 cm with prior measurement 2.5 cm. These findings 
represent a very significant improvement since prior CT. 
 
OTHER: No acute or aggressive osseous abnormalities identified. _______________ Impression IMPRESSION: 
 
 
1. Bandlike fibrosis bilateral lower lobes and lingula without change. No acute 
pulmonary process. 2. Mild cardiomegaly. Pulmonary hypertension again evident. 3. Significant improvement in mediastinal adenopathy since prior study. No 
definitive hilar adenopathy remaining. CXR 9/30/18 Results from Hospital Encounter encounter on 09/30/18 XR CHEST PORT Narrative EXAM: XR CHEST PORT 
 
CLINICAL INDICATION/HISTORY: CVA >Additional: None COMPARISON: Chest x-ray most recently 6/14/2015 >Reference exam: Chest CT from 2/21/2015 TECHNIQUE: Portable chest. 
 
_______________ FINDINGS: 
 
SUPPORT LINES AND TUBES: None. HEART AND MEDIASTINUM: The heart is partially obscured due to the low lung 
volumes but grossly stable in size and contour. Bilateral hilar prominence in 
keeping with adenopathy seen to better advantage on prior CT from February 2015. LUNGS AND PLEURAL SPACES: The lungs remain underexpanded with basilar volume 
loss. Band of subsegmental atelectasis in the left perihilar region is noted. BONY THORAX AND SOFT TISSUES: The bones and soft tissues are within normal 
limits. _______________ Impression IMPRESSION: 
 
1. Hypoventilatory changes and bibasilar volume loss, similar to the prior 
examination. No acute cardiopulmonary abnormality or significant interval 
change. MRI brain 9/30/18: 
1. Possible tiny area of acute right parietal infarction, evident on diffusion 
only without hemorrhage or mass effect. 2. Moderately prominent ischemic white matter change. Ischemic changes in the 
thalami. 3. Chronic left frontal parietal and small right frontal cortical/subcortical 
infarctions. Numerous chronic bilateral cerebellar hemisphere infarctions. IMPRESSION:  
· Vent dependent respiratory failure 10/8/18 - post laparotomy · Adenocarcinoma of colon (syncope and anemia leading to further evaluation and diagnosis of colon mass) - s/p laparatomy and RT colectomy 10/8/18 · Syncope, due to severe anemia. Nuclear stress test negative. · S/p very mild hemoptysis, very mild blood tinged sputum couple times since last couple days. Resolved. CT chest unremarkable for any causes for hemoptysis. · Hx of suspected Sarcoidosis (mediastinal/hilar lymphadenopathy) and chronic bilateral lobar atelectasis · COPD with paraseptal emphysema on CT chest - prior FEV1 of 51% ·  Chronic hypoxic respiratory failure on home O2 2-3 L/m · Former 2 PPD x20 yrs - smoker, quit 40 yrs ago · Tracheobronchomalacia  
· HTN 
· DM 2 
· AFib · Anemia · Obesity RECOMMENDATIONS:  
RS - Pao2/fio2 >250; fio2 at 45%; peep 5; min vent < 10 L; CXR stable-ET tube low; plan weaning trials for extubation CVS-stable hemodynamically; continue IV fluids post op Bronchodilators-duonebs; incent spirometry post extubation Consider HF nc O2 post extubation-considering abd surgery On home 02 at 2-3 lit/min at baseline; target O2 sats 90-95%; NIV is an option considering COPD hx, but avoid for now due to risks for gastric distention in post op state. GI - npo for now; await Surgery to clear for diet Renal-stable renal fn 
Hem: chronic anemia; keep Hb>7 gm/dl DVT Prophylaxis: SCDs GI Prophylaxis: PPI Sedation - propofol - stopped for weaning Pain - low dose fentanyl iv 
 
 Other issues management by primary team and respective consultants. Further recommendations will be based on the patient's response to recommended treatment and results of the investigation ordered. HOB >=30 degree, aspiration precautions, aggressive pulmonary toileting, incentive spirometry, PT/OT eval and treat, mobilization. Discussed with family - daughter at bedside. Updated management plans. D.w icu nurse and RT 
 
CC time 36 mins, excludes d/w family or procedures Baldev Pickard MD 
10/9/2018

## 2018-10-09 NOTE — PROGRESS NOTES
1905 Completed shift report and assumed care from EDGARDO Jackson RN. We reviewed SBAR, labs, meds, and plan of care 1930 Completed shift assessment 80 Reassessed pt 
0345 Reassessed pt and repositioned for comfort 0715 Completed shift report and transferred care to RAOUL Garcia RN. We reviewed SBAR, labs, meds, and plan of care for pt.

## 2018-10-09 NOTE — PROGRESS NOTES
RESPIRATORY NOTE: 
Pt remains asleep, comfortable on ventilator, Albuterol and Atrovent given in line, bilateral BS noted, will proceed with weaning per MD.

## 2018-10-09 NOTE — ANESTHESIA POSTPROCEDURE EVALUATION
Post-Anesthesia Evaluation & Assessment Visit Vitals  /66  Pulse (!) 109  Temp 36.8 °C (98.2 °F)  Resp 24  
 Ht 5' 3\" (1.6 m)  Wt 92 kg (202 lb 13.2 oz)  SpO2 96%  BMI 35.93 kg/m2 Pt taken to ICU intubated/sedated post-op Hemodynamically stable No apparent anesthetic complications requiring additional post-anesthetic care Yasir Valero MD

## 2018-10-09 NOTE — PROGRESS NOTES
2320 Completed report, via telephone, with Libra Noonan RN. We reviewed SBAR, meds, and plan of care. 18 Paged Dr. Anita Maya regarding pt coming to ICU ventilated, he gave me verbal orders for continuous propofol drip for sedation of pt.  
2345 Pt arrived to ICU unit and is safely in bed 1 
0030 Assessment completed 0215 Reposition pt and provided oral care 
0400 Reassessed pt 
0710 Completed shift report and transferred care to EDGARDO Jackson RN. We reviewed SBAR, labs, meds, and plan of care for pt.

## 2018-10-09 NOTE — DIABETES MGMT
GLYCEMIC CONTROL PROGRESS NOTE: 
 
-discussed in rounds, known h/o DM 
-POCT + - Humalog Normal Insulin Sensitivity Corrective Coverage orders in place recommend continue 
-BG out of target range ICU: 140-180 mg/dL Recent Glucose Results:  
Lab Results Component Value Date/Time  (H) 10/09/2018 12:20 AM  
 GLUCPOC 179 (H) 10/09/2018 05:42 AM  
 GLUCPOC 198 (H) 10/09/2018 12:00 AM  
 GLUCPOC 130 (H) 10/08/2018 05:30 PM  
 
Mabel Strauss MS, RN, CDE Glycemic Control Team 
[phone] Pager [phone] (M-TH 8:00-4:30P) *After Hours pager [phone]

## 2018-10-09 NOTE — BRIEF OP NOTE
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 
   * Laura Harrison DO - Primary Surgical Assistant:  
 
Surgical Staff: 
Circ-1: Clifton Dominguez Circ-Relief: Zack Mcintosh RN Scrub Tech-1: Ilana Gonzales Scrub Tech-Relief: Sheryl Meigs Scrub RN-Relief: Danae Jarrett RN Surg Asst-1: [facility] Event Time In Incision Start 2031 Incision Close Anesthesia: General  
Estimated Blood Loss: 100 Specimens:  
ID Type Source Tests Collected by Time Destination 1 : Mesenteric Mass Preservative OTHER (use comments)  Laura Harrison,  10/8/2018 2134 Pathology 2 : Right Colon Preservative Colon, Right  Laura Harrison, DO 10/8/2018 2150 Pathology 3 : Small Bowel Preservative Small Bowel  Laura Harrison, DO 10/8/2018 2300 Pathology Findings: large r colon mass. Several large mesenteric masses. Enlarged LN, jejunal metastasis in mesentery and intraluminal with almost near obstruction Complications: none Implants: * No implants in log *

## 2018-10-09 NOTE — PROGRESS NOTES
Problem: Patient Education: Go to Patient Education Activity Goal: Patient/Family Education Outcome: Progressing Towards Goal 
Stroke Education provided to patient and relative(s) and the following topics were discussed 1. Patients personal risk factors for stroke are hypertension, hyperlipidemia, diabetes mellitus and prior stroke 2. Warning signs of Stroke: * Sudden numbness or weakness of the face, arm or leg, especially on one side of The body * Sudden confusion, trouble speaking or understanding * Sudden trouble seeing in one or both eyes * Sudden trouble walking, dizziness, loss of balance or coordination * Sudden severe headache with no known cause 3. Importance of activation Emergency Medical Services ( 9-1-1 ) immediately if experience any warning signs of stroke. 4. Be sure and schedule a follow-up appointment with your primary care doctor or any specialists as instructed. 5. You must take medicine every day to treat your risk factors for stroke. Be sure to take your medicines exactly as your doctor tells you: no more, no less. Know what your medicines are for , what they do. Anti-thrombotics /anticoagulants can help prevent strokes. You are taking the following medicine(s)  Medications deferre 6. Smoking and second-hand smoke greatly increase your risk of stroke, cardiovascular disease and death. Smoking history ended year 1968 
 
 
7. Information provided was [facility] Stroke Education [facility] [place] or Stroke Handouts 8. Documentation of teaching completed in Patient Education Activity and on Care Plan with teaching response noted? yes

## 2018-10-09 NOTE — PROGRESS NOTES
Hospitalist Progress Note Patient: Doris Mariscal MRN: [id number]  CSN: [id number] YOB: 1932  Age: 80 y.o. Sex: female DOA: 9/30/2018 LOS:  LOS: 9 days Chief Complaint: resp failure. Assessment/Plan Disposition : 
Patient Active Problem List  
Diagnosis Code  Dyspnea R06.00  Troponin level elevated R74.8  Wheezing R06.2  Sarcoidosis D86.9  Diabetes ([facility] E11.9  Hypoxia R09.02  Stroke ([facility] [facility] I63.9  Syncope R55  Chronic respiratory failure with hypoxia, on home O2 therapy (HCC) J96.11, Z99.81  
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  
 Colonic mass K63.9  
 
79 y/o with severe anemia s/ p colonic mass resection admitted to the icu on the vent. Respiratory failure post op on vent. Severe anemia 2/2 colonic mass. Colon mass probable cancer. NIDDM. COPD. Chronic respiratory failure on home O2. 
  
-Follow hgb. -Wean off vent. Extubate today. -Back off on fluids. Rx one dose of furosemide. Albumin low. -DM mgmt. -DVT / GI px. 
  
 
 
Subjective: 
 
Seen in icu 1. Intubated. Family present. Review of systems: 
 
Sedated. Vital signs/Intake and Output: 
Visit Vitals  /70  Pulse 87  Temp 98.8 °F (37.1 °C)  Resp 14  
 Ht 5' 3\" (1.6 m)  Wt 92 kg (202 lb 13.2 oz)  SpO2 100%  BMI 35.93 kg/m2 Current Shift:    
Last three shifts:  10/07 1901 - 10/09 0700 In: 4402.3 [I.V.:4402.3] Out: 510 [Urine:410] Exam: 
 
General: NAD Head/Neck: MMM 
CVS:s1s2 Lungs:Clear to auscultation bilaterally, no wheezes, rhonchi, or rales. Intubated. Abdomen: Soft, Nontender, No distention, Normal Bowel sounds, No hepatomegaly Extremities: 1-2+ edema. Labs: Results:  
   
Chemistry Recent Labs 10/09/18 
 0020  10/08/18 
 2987  10/07/18 
 1077 GLU  181*  121*  132* NA  141  142  143  
K  4.5  4.6  4.7 CL  106  105  106 CO2  30  30  32 BUN  14  10  12 CREA  1.40*  1.21  1.35* CA  8.5  8.8  8.8 AGAP  5  7  5 BUCR  10*  8*  9* AP  49   --   43* TP  5.9*   --   6.3* ALB  2.6*   --   2.8*  
GLOB  3.3   --   3.5 AGRAT  0.8   --   0.8 CBC w/Diff Recent Labs 10/09/18 
 0020  10/08/18 
 1636  10/08/18 
 0359  10/07/18 
 1929 WBC  8.9   --   5.6  5.8  
RBC  4.35   --   3.69*  3.68* HGB  9.3*  9.4*  7.3*  7.3* HCT  31.6*  31.6*  26.2*  25.9*  
PLT  220   --   241  221 GRANS   --    --   62  67 LYMPH   --    --   22  20* EOS   --    --   4  4 Cardiac Enzymes No results for input(s): CPK, CKND1, YESENIA in the last 72 hours. No lab exists for component: Dayana Ruts Coagulation No results for input(s): PTP, INR, APTT in the last 72 hours. No lab exists for component: INREXT Lipid Panel Lab Results Component Value Date/Time Cholesterol, total 175 09/30/2018 10:00 AM  
 HDL Cholesterol 81 (H) 09/30/2018 10:00 AM  
 LDL, calculated 70 09/30/2018 10:00 AM  
 VLDL, calculated 24 09/30/2018 10:00 AM  
 Triglyceride 120 09/30/2018 10:00 AM  
 CHOL/HDL Ratio 2.2 09/30/2018 10:00 AM  
  
BNP No results for input(s): BNPP in the last 72 hours. Liver Enzymes Recent Labs 10/09/18 
 0020 TP  5.9* ALB  2.6* AP  49 SGOT  30 Thyroid Studies No results found for: T4, T3U, TSH, TSHEXT Procedures/imaging: see electronic medical records for all procedures/Xrays and details which were not copied into this note but were reviewed prior to creation of Plan Sammie Calzada MD

## 2018-10-09 NOTE — PROGRESS NOTES
Problem: Mobility Impaired (Adult and Pediatric) Goal: *Acute Goals and Plan of Care (Insert Text) Physical Therapy Goals Initiated 10/1/2018 and to be accomplished within 7 day(s) 1. Patient will move from supine <> sit with S in prep for out of bed activity and change of position. 2.  Patient will perform sit<> stand with S/RW in prep for transfers/ambulation. 3.  Patient will transfer from bed <> chair with S/RW for time up in chair for completion of ADL activity. 4.  Patient will ambulate 100 feet with S/RW without LOB/path deviatons for improved functional mobility/qait/safe discharge. Reviewed and updated 10/6/2018 and to be accomplished within 7 day(s) 1. Patient will move from supine to sit and sit to supine  in bed with modified independence. 2.  Patient will transfer from bed to chair and chair to bed with modified independence using the least restrictive device. 3.  Patient will perform sit to stand with modified independence. 4.  Patient will ambulate with modified independence for 300 feet with the least restrictive device. 5.  Patient will ascend/descend 1 stairs without handrail(s) with supervision. Outcome: Progressing Towards Goal 
physical Therapy RE-EVALUATION and TREATMENT Patient: Leonor Cruz (89 y.o. female) Date: 10/9/2018 Diagnosis: Stroke ([facility] [address] 
anemia BLEEDING OF COLON Syncope Procedure(s) (LRB): 
RIGHT COLECTOMY,RESECTION OF MESENTERIC MASS, SMALL BOWEL RESECTION (N/A) 1 Day Post-Op Precautions: Fall, Aspiration, Skin ASSESSMENT: 
Pt seen in supine prior to session on high flow, BP cuff, telemonitor, IV, pulse ox, zarate catheter, and B/L SCDs donned. Pt reported abdominal pain prior to session. Pt educated on log rolling technique w/ pillow bracing to alleviate pain during bed mobility task. Pt desated once sitting at the EOB however able to increase in O2 levels w/ deep breathing technique.  Pt was tachycardic during session w/ HR in the 120s once sitting at the EOB and increased to the 140s once in standing. Pt demonstrates impaired balance while standing w/ RW/GB, demonstrating a posterior COG. Pt required vcing maintain erect posture while standing however pt demonstrated difficulty. Pt transferred back to supine in bed secondary to decrease activity tolerance. Pt began to bleed through the dressing at the abdominal incision, nurse made aware. Pt left in supine after session, call bell and tray in reach, nurse present and consulted after session. Patient's progression toward goals since last assessment: Please see above. PLAN: 
Goals have been updated based on progression since last assessment. Patient continues to benefit from skilled intervention to address the above impairments. Continue to follow the patient once/twice daily to address goals. Planned Interventions: 
[x]     Bed Mobility Training          [x]     Neuromuscular Re-Education 
[x]     Transfer Training                []    Orthotic/Prosthetic Training 
[x]     Gait Training                       []     Modalities [x]     Therapeutic Exercises       []     Edema Management/Control 
[x]     Therapeutic Activities         [x]     Patient and Family Training/Education 
[]     Other (comment): 
Discharge Recommendations: Rehab Further Equipment Recommendations for Discharge: rolling walker SUBJECTIVE:  
Patient stated I feel pain in my stomach.  OBJECTIVE DATA SUMMARY:  
Critical Behavior: 
Neurologic State: Pharmacologically paralyzed Orientation Level: Unable to verbalize Cognition: No command following Safety/Judgement: Decreased insight into deficits, Decreased awareness of need for safety, Decreased awareness of need for assistance Functional Mobility Training: 
Bed Mobility: 
Rolling: Minimum assistance Supine to Sit: Moderate assistance; Additional time Sit to Supine: Maximum assistance; Additional time;Assist x2 Scooting: Contact guard assistance Transfers: 
Sit to Stand: Moderate assistance;Assist x2 Stand to Sit: Minimum assistance; Additional time Balance: 
Sitting: Intact Sitting - Static: Good (unsupported) Sitting - Dynamic: Good (unsupported) Standing: Impaired; With support Standing - Static: Fair;Poor Standing - Dynamic : Poor Pain: 
Pain Scale 1: Numeric (0 - 10) Pain Intensity 1: 4 Pain Location 1: Abdomen Pain Orientation 1: Anterior Activity Tolerance:  
Fair Please refer to the flowsheet for vital signs taken during this treatment. After treatment:  
[]  Patient left in no apparent distress sitting up in chair 
[x]  Patient left in no apparent distress in bed 
[x]  Call bell left within reach [x]  Nursing notified 
[]  Caregiver present 
[]  Bed alarm activated Bhavya Valencia PT Time Calculation: 26 mins Mobility:  Current  CL= 60-79%   Goal  CI= 1-19%. The severity rating is based on the Other Based on functional assessment

## 2018-10-09 NOTE — PROGRESS NOTES
Post Intubation ABG's drawn after arrival to the ICU. FIO2 decreased from 50% to 45% after results were obtained. No other changes made.

## 2018-10-10 NOTE — PROGRESS NOTES
Surgery Post Operative Day Note Maia Davis Admit Date: 9/30/2018 Surgery Date:  [unfilled] 
POD: 1 Day Post-Op Subjective:  
  
Patient present conditions: No significant medical complaints 
extubated and responsive. Pain controlled. Objective:  
 
Patient Vitals for the past 24 hrs: 
 BP Temp Pulse Resp SpO2 Height Weight 10/09/18 2130 123/58 - (!) 120 23 98 % - -  
10/09/18 2100 136/73 - (!) 129 27 93 % - -  
10/09/18 2030 (!) 152/123 - (!) 124 28 92 % - -  
10/09/18 2000 132/74 - (!) 122 25 98 % - -  
10/09/18 1930 135/70 98.7 °F (37.1 °C) (!) 121 28 98 % 5' 3\" (1.6 m) 92 kg (202 lb 13.2 oz) 10/09/18 1905 - 98.7 °F (37.1 °C) - - - - -  
10/09/18 1900 122/65 - (!) 122 28 98 % - -  
10/09/18 1830 131/83 - (!) 120 29 97 % - -  
10/09/18 1800 111/63 - (!) 118 26 96 % - -  
10/09/18 1730 118/68 - (!) 117 26 98 % - -  
10/09/18 1700 127/69 - (!) 123 17 98 % - -  
10/09/18 1643 - 98.4 °F (36.9 °C) - - - - -  
10/09/18 1630 - - (!) 122 24 98 % - -  
10/09/18 1600 - - - - 98 % - -  
10/09/18 1530 - - (!) 120 18 96 % - -  
10/09/18 1527 - - - - 95 % - -  
10/09/18 1500 (!) 173/136 - (!) 123 14 97 % - -  
10/09/18 1430 155/77 - (!) 114 24 97 % - -  
10/09/18 1400 138/63 - (!) 112 25 97 % - -  
10/09/18 1330 (!) 119/97 - (!) 115 21 95 % - -  
10/09/18 1300 148/66 - (!) 109 24 96 % - -  
10/09/18 1230 155/63 - (!) 110 21 93 % - -  
10/09/18 1200 138/66 - (!) 108 23 95 % - -  
10/09/18 1147 - - - - 98 % - -  
10/09/18 1134 - 98.2 °F (36.8 °C) - - - - -  
10/09/18 1130 152/69 - (!) 108 21 97 % - -  
10/09/18 1100 146/65 - (!) 109 21 98 % - -  
10/09/18 1030 (!) 167/98 - (!) 113 21 99 % - -  
10/09/18 1018 166/82 - (!) 113 - - - -  
10/09/18 1000 166/82 - (!) 110 (!) 32 97 % - -  
10/09/18 0930 138/71 - (!) 116 17 99 % - -  
10/09/18 0906 - - (!) 118 20 99 % - -  
10/09/18 0900 148/77 - (!) 115 13 97 % - -  
10/09/18 0830 130/65 - 87 14 - - -  
10/09/18 0813 - - 87 14 100 % - -  
 10/09/18 0800 131/71 98.8 °F (37.1 °C) 86 14 99 % - -  
10/09/18 0730 132/73 - 86 14 100 % - -  
10/09/18 0700 127/70 - 86 14 99 % - -  
10/09/18 0630 129/68 - 85 14 99 % - -  
10/09/18 0600 127/67 - 86 14 100 % - -  
10/09/18 0530 125/74 - 88 14 100 % - -  
10/09/18 0519 - - 89 14 100 % - -  
10/09/18 0500 138/74 - 89 14 100 % - -  
10/09/18 0430 141/70 - 90 14 100 % - -  
10/09/18 0400 116/60 98.4 °F (36.9 °C) 91 14 100 % - -  
10/09/18 0330 113/62 - 91 14 100 % - -  
10/09/18 0300 110/60 - 90 14 100 % - -  
10/09/18 0230 109/59 - 92 14 100 % - -  
10/09/18 0200 126/69 - 93 14 100 % - -  
10/09/18 0145 124/66 - 92 14 99 % - -  
10/09/18 0130 122/65 - 93 14 100 % - -  
10/09/18 0100 125/68 - 93 14 100 % - -  
10/09/18 0045 130/66 - 95 14 100 % - -  
10/09/18 0030 156/73 - 100 14 100 % - -  
10/09/18 0015 147/68 97.5 °F (36.4 °C) 95 14 100 % 5' 3\" (1.6 m) 92 kg (202 lb 13.2 oz) 10/09/18 0002 146/75 - 97 14 100 % - -  
10/08/18 2355 140/70 - 98 14 100 % - -  
10/08/18 2351 - - (!) 102 14 100 % - -  
10/08/18 2345 105/59 97.4 °F (36.3 °C) (!) 103 13 100 % - -  
  
10/08 0701 - 10/09 1900 In: 5348.6 [P.O.:240; I.V.:5108.6] Out: 1110 [Urine:1010] Recent Results (from the past 24 hour(s)) GLUCOSE, POC Collection Time: 10/09/18 12:00 AM  
Result Value Ref Range Glucose (POC) 198 (H) 70 - 110 mg/dL POC G3 Collection Time: 10/09/18 12:04 AM  
Result Value Ref Range Device: VENT    
 FIO2 (POC) 0.50 % pH (POC) 7.467 (H) 7.35 - 7.45    
 pCO2 (POC) 40.1 35.0 - 45.0 MMHG  
 pO2 (POC) 102 (H) 80 - 100 MMHG  
 HCO3 (POC) 29.2 (H) 22 - 26 MMOL/L  
 sO2 (POC) 98 (H) 92 - 97 % Base excess (POC) 5 mmol/L Mode ASSIST CONTROL Tidal volume 400 ml Set Rate 14 bpm  
 PEEP/CPAP (POC) 5 cmH2O Allens test (POC) YES Inspiratory Time 1 sec Total resp. rate 14 Site LEFT RADIAL Patient temp. 97.0 Specimen type (POC) ARTERIAL Performed by Georgiana Miller Volume control plus YES    
METABOLIC PANEL, COMPREHENSIVE Collection Time: 10/09/18 12:20 AM  
Result Value Ref Range Sodium 141 136 - 145 mmol/L Potassium 4.5 3.5 - 5.5 mmol/L Chloride 106 100 - 108 mmol/L  
 CO2 30 21 - 32 mmol/L Anion gap 5 3.0 - 18 mmol/L Glucose 181 (H) 74 - 99 mg/dL BUN 14 7.0 - 18 MG/DL Creatinine 1.40 (H) 0.6 - 1.3 MG/DL  
 BUN/Creatinine ratio 10 (L) 12 - 20 GFR est AA 43 (L) >60 ml/min/1.73m2 GFR est non-AA 36 (L) >60 ml/min/1.73m2 Calcium 8.5 8.5 - 10.1 MG/DL Bilirubin, total 1.4 (H) 0.2 - 1.0 MG/DL  
 ALT (SGPT) 22 13 - 56 U/L  
 AST (SGOT) 30 15 - 37 U/L Alk. phosphatase 49 45 - 117 U/L Protein, total 5.9 (L) 6.4 - 8.2 g/dL Albumin 2.6 (L) 3.4 - 5.0 g/dL Globulin 3.3 2.0 - 4.0 g/dL A-G Ratio 0.8 0.8 - 1.7    
CBC W/O DIFF Collection Time: 10/09/18 12:20 AM  
Result Value Ref Range WBC 8.9 4.6 - 13.2 K/uL  
 RBC 4.35 4.20 - 5.30 M/uL HGB 9.3 (L) 12.0 - 16.0 g/dL HCT 31.6 (L) 35.0 - 45.0 % MCV 72.6 (L) 74.0 - 97.0 FL  
 MCH 21.4 (L) 24.0 - 34.0 PG  
 MCHC 29.4 (L) 31.0 - 37.0 g/dL RDW 26.8 (H) 11.6 - 14.5 % PLATELET 852 181 - 569 K/uL MPV 9.1 (L) 9.2 - 11.8 FL  
POC G3 Collection Time: 10/09/18  5:27 AM  
Result Value Ref Range Device: VENT    
 FIO2 (POC) 0.45 % pH (POC) 7.468 (H) 7.35 - 7.45    
 pCO2 (POC) 40.5 35.0 - 45.0 MMHG  
 pO2 (POC) 128 (H) 80 - 100 MMHG  
 HCO3 (POC) 29.3 (H) 22 - 26 MMOL/L  
 sO2 (POC) 99 (H) 92 - 97 % Base excess (POC) 6 mmol/L Mode ASSIST CONTROL Tidal volume 400 ml Set Rate 14 bpm  
 PEEP/CPAP (POC) 5 cmH2O  
 PIP (POC) 18 Allens test (POC) N/A Inspiratory Time 1 sec Total resp. rate 14 Site RIGHT RADIAL Patient temp. 98.4 Specimen type (POC) ARTERIAL Performed by Isael Figueroa Volume control plus YES    
GLUCOSE, POC Collection Time: 10/09/18  5:42 AM  
Result Value Ref Range Glucose (POC) 179 (H) 70 - 110 mg/dL GLUCOSE, POC Collection Time: 10/09/18 12:13 PM  
Result Value Ref Range Glucose (POC) 204 (H) 70 - 110 mg/dL GLUCOSE, RANDOM Collection Time: 10/09/18  1:22 PM  
Result Value Ref Range Glucose 283 (H) 74 - 99 mg/dL GLUCOSE, POC Collection Time: 10/09/18  4:09 PM  
Result Value Ref Range Glucose (POC) 211 (H) 70 - 110 mg/dL GLUCOSE, POC Collection Time: 10/09/18  5:49 PM  
Result Value Ref Range Glucose (POC) 165 (H) 70 - 110 mg/dL Abdomen:             Abdominal Assessment: Pannus, Obese Wound:  
  Status:                   Peripheral IV 10/08/18 Left Arm-Dressing Status: Clean, dry, & intact Penrose Drain 10/08/18 Abdomen-Dressing Status: Clean, dry, & intact Peripheral IV 10/08/18 Right Hand-Dressing Status: Clean, dry, & intact [REMOVED] Peripheral IV 09/30/18 Left Antecubital-Dressing Status: Clean, dry, & intact Dressing Changes:Peripheral IV 10/08/18 Left Arm-Dressing Status: Clean, dry, & intact Penrose Drain 10/08/18 Abdomen-Dressing Status: Clean, dry, & intact Peripheral IV 10/08/18 Right Hand-Dressing Status: Clean, dry, & intact [REMOVED] Peripheral IV 09/30/18 Left Antecubital-Dressing Status: Clean, dry, & intact Pain Level:             
 
Nausea and Vomiting: minus Stool: minus Assessment:  
 
Progress: good Plan:  
 
Diet: start clear liquids Plan for continued care: mobilize and PT/OT, pt doing well.  
 
 
Signed By: Nathaly Hauser DO

## 2018-10-10 NOTE — ROUTINE PROCESS
Bedside, Verbal and Written shift change report given to RAOUL Mojica (oncoming nurse) by MAYELA Davis RN (offgoing nurse). Report included the following information SBAR, Kardex, Intake/Output and Recent Results.  
 
 
1000. Scheduled med's given, pt tolerated well, Pt assisted OBC, VSS. 
1230. Pt assisted back to bed, family at bedside. 1345. Dr. Heather Mcghee on phone, orders received, transfer pt to telemetry floor. Pt VSS. Family at bedside. TRANSFER - OUT REPORT: 
 
Verbal report given to Rosa Isela Flores RN(name) on Maia Hummel  being transferred to [address] [place] [address]([facility]) for routine progression of care Report consisted of patients Situation, Background, Assessment and  
Recommendations(SBAR). Information from the following report(s) SBAR, Kardex, Procedure Summary and Recent Results was reviewed with the receiving nurse. Lines:  
Peripheral IV 10/08/18 Left Arm (Active) Site Assessment Clean, dry, & intact 10/10/2018  3:45 AM  
Phlebitis Assessment 0 10/10/2018  3:45 AM  
Infiltration Assessment 0 10/10/2018  3:45 AM  
Dressing Status Clean, dry, & intact 10/10/2018  3:45 AM  
Dressing Type Transparent 10/10/2018  3:45 AM  
Hub Color/Line Status Blue;Capped 10/10/2018  3:45 AM  
Action Taken Open ports on tubing capped 10/10/2018  3:45 AM  
Alcohol Cap Used Yes 10/10/2018  3:45 AM  
   
Peripheral IV 10/08/18 Right Hand (Active) Site Assessment Clean, dry, & intact 10/10/2018  3:45 AM  
Phlebitis Assessment 0 10/10/2018  3:45 AM  
Infiltration Assessment 0 10/10/2018  3:45 AM  
Dressing Status Clean, dry, & intact 10/10/2018  3:45 AM  
Dressing Type Transparent 10/10/2018  3:45 AM  
Hub Color/Line Status Infusing 10/10/2018  3:45 AM  
Action Taken Open ports on tubing capped 10/10/2018  3:45 AM  
Alcohol Cap Used Yes 10/10/2018  3:45 AM  
  
 
Opportunity for questions and clarification was provided. Patient transported with: 
 Monitor Tech

## 2018-10-10 NOTE — PROGRESS NOTES
ARU/IPR REFERRAL CONTACT NOTE [id number] [facility]od[facility] for Physical [facility] Re: Milan Gerber Follow up on IP Consult for IP Rehab Screen. Current status reviewed. S/p  right colectomy, resection of mass, small bowel resection following admission for left sided weakness/syncopal episode (Brain MRI: possible tiny area of acute right parietal infarction). PT re-eval completed yesterday noting tachycardic during session/decreased activity tolerance; OT re-eval pending; last ST 10/8/18. Aware of family's preferences regarding continued care (IPR vs SNF). Will continue to follow medical status & progress with therapy and advise. Thank you for the consult. Carleen Ramirez

## 2018-10-10 NOTE — PROGRESS NOTES
Spoke with pt and daughter Dada Arias at bedside,made aware pt has been accepted @ [facility],daughter which is patients MPOA accepted.

## 2018-10-10 NOTE — PROGRESS NOTES
Hospitalist Progress Note Patient: Jose Anaya MRN: [id number]  CSN: [id number] YOB: 1932  Age: 80 y.o. Sex: female DOA: 9/30/2018 LOS:  LOS: 10 days Chief Complaint: 
 
Colon cancer Assessment/Plan Colon cancer s/p surgery Ac resp failure requiring post op ventilation, now extubated and on high flow 02 Chronic resp failure with presumed sarcoid and hx of tobacco use Ac blood loss anemia Diabetes, type 2 Morbid obesity Weakness H/o cva Stable this am 
Weaning from 02 No uncontrolled pain issues Mackenzie to be removed Labs reviewed Discussed with family-expect to floor soon, then start d/c planning for rehab Diet as per surgery SCD for DVT prophyl;axis Daily CBC Watch BG ACHS Disposition : 
Patient Active Problem List  
Diagnosis Code  Dyspnea R06.00  Troponin level elevated R74.8  Wheezing R06.2  Sarcoidosis D86.9  Diabetes ([address] E11.9  Hypoxia R09.02  Stroke ([address] I63.9  Syncope R55  Chronic respiratory failure with hypoxia, on home O2 therapy (HCC) J96.11, Z99.81  
 HTN (hypertension) I10  Type II diabetes mellitus, uncontrolled ([address] E11.65  Severe anemia D64.9  Sarcoidosis of lung ([address] D86.0  
 Colonic mass K63.9 Subjective: No uncontrolled pain Did not sleep well Denies SOB or prod cough No CP Review of systems: 
 
Constitutional: denies fevers, chills, myalgias Respiratory: denies SOB, cough No palpitations Gastrointestinal: denies nausea, vomiting, diarrhea Vital signs/Intake and Output: 
Visit Vitals  /87 (BP 1 Location: Left arm, BP Patient Position: Standing)  Pulse (!) 147  Temp 98.3 °F (36.8 °C)  Resp 25  
 Ht 5' 3\" (1.6 m)  Wt 92 kg (202 lb 13.2 oz)  SpO2 97%  BMI 35.93 kg/m2 Current Shift:  10/10 0701 - 10/10 1900 In: -  
Out: 400 [Urine:400] Last three shifts:  10/08 1901 - 10/10 0700 In: 6203.7 [P.O.:240; I.V.:5963.7] Out: 1735 [[facility]:9940] Exam: 
 
General: elderly obese BF, alert, NAD, OX3 Head/Neck: NCAT, supple, No masses, No lymphadenopathy CVS:Regular rate and rhythm, no M/R/G, S1/S2 heard, no thrill Lungs:Clear to auscultation bilaterally, no wheezes, rhonchi, or rales Abdomen: Soft, dressed, diminished BS 
zarate Extremities: No C/C/E, pulses palpable 2+ Skin:normal texture and turgor, no rashes, no lesions Neuro:grossly normal , follows commands Psych:appropriate Labs: Results:  
   
Chemistry Recent Labs 10/10/18 
 0118  10/09/18 
 1322  10/09/18 
 0020  10/08/18 
 6551 GLU  153*  283*  181*  121* NA  141   --   141  142  
K  4.4   --   4.5  4.6 CL  105   --   106  105 CO2  33*   --   30  30 BUN  11   --   14  10 CREA  1.26   --   1.40*  1.21  
CA  8.5   --   8.5  8.8 AGAP  3   --   5  7 BUCR  9*   --   10*  8* AP   --    --   49   --   
TP   --    --   5.9*   --   
ALB   --    --   2.6*   --   
GLOB   --    --   3.3   --   
AGRAT   --    --   0.8   --   
  
CBC w/Diff Recent Labs 10/10/18 
 0714  10/09/18 
 0020  10/08/18 
 1636  10/08/18 
 8507 WBC  9.3  8.9   --   5.6  
RBC  3.93*  4.35   --   3.69* HGB  8.4*  9.3*  9.4*  7.3* HCT  28.7*  31.6*  31.6*  26.2*  
PLT  196  220   --   241 GRANS  77*   --    --   62  
LYMPH  11*   --    --   22 EOS  0   --    --   4 Cardiac Enzymes No results for input(s): CPK, CKND1, YESENIA in the last 72 hours. No lab exists for component: Fabiana Dixon Coagulation No results for input(s): PTP, INR, APTT in the last 72 hours. No lab exists for component: INREXT Lipid Panel Lab Results Component Value Date/Time  Cholesterol, total 175 09/30/2018 10:00 AM  
 HDL Cholesterol 81 (H) 09/30/2018 10:00 AM  
 LDL, calculated 70 09/30/2018 10:00 AM  
 VLDL, calculated 24 09/30/2018 10:00 AM  
 Triglyceride 120 09/30/2018 10:00 AM  
 CHOL/HDL Ratio 2.2 09/30/2018 10:00 AM  
  
 BNP No results for input(s): BNPP in the last 72 hours. Liver Enzymes Recent Labs 10/09/18 
 0020 TP  5.9* ALB  2.6* AP  49 SGOT  30 Thyroid Studies No results found for: T4, T3U, TSH, TSHEXT Procedures/imaging: see electronic medical records for all procedures/Xrays and details which were not copied into this note but were reviewed prior to creation of Plan Liliya Arizmendi MD

## 2018-10-10 NOTE — PROGRESS NOTES
Problem: Self Care Deficits Care Plan (Adult) Goal: *Acute Goals and Plan of Care (Insert Text) Initial OT STGs (10/10/2018) Within 7 days: 1. Patient will perform toilet transfer with CGA in preparation for bowel and bladder management. 2. Patient will perform bowel and bladder management with CGA/minimal Assist for increased independence with ADLs. 3. Patient will perform UB dressing with setup while utilizing mer-techniques for increased independence with ADLs. 4. Patient will perform LB dressing with moderate assist while utilizing mer-techniques for increased independence with ADLs. 5. Patient will visually attend to L side of environment with 1 verbal cues in preparation for ADLs. 6. Patient will perform UE exercises with minimal assist for 15 minutes to increase independence with ADLs. 7. Patient will utilize energy conservation techniques with 1 verbal cue(s) for increased independence with ADLs. 8. Patient will perform self-feeding, including opening containers w/ setup/Kirsten for increased independence with ADLs. Initial OT STGs (10/1/2018) Within 7 days: 1. Patient will perform toilet transfer with Supervision in preparation for bowel and bladder management. (not met) 2. Patient will perform bowel and bladder management with setup for increased independence with ADLs. (not met) 3. Patient will perform UB dressing with setup while utilizing mer-techniques for increased independence with ADLs. (not met) 4. Patient will perform LB dressing with setup while utilizing mer-techniques for increased independence with ADLs. (not met) 5. Patient will visually attend to L side of environment with 1 verbal cues in preparation for ADLs. (not met) 6. Patient will perform UE exercises with minimal assist for 15 minutes to increase independence with ADLs. (not met) 7. Patient will utilize energy conservation techniques with 1 verbal cue(s) for increased independence with ADLs. (not met) 8. Patient will perform self-feeding, including opening containers w/ setup/Kirsten for increased independence with ADLs. (not met) Outcome: Progressing Towards Goal 
Occupational Therapy ReEVALUATION Patient: Anshul Russell (46 y.o. female) Date: 10/10/2018 Diagnosis: Stroke ([address] 
anemia BLEEDING OF COLON Syncope Procedure(s) (LRB): 
RIGHT COLECTOMY,RESECTION OF MESENTERIC MASS, SMALL BOWEL RESECTION (N/A) 2 Days Post-Op Precautions: Fall, Aspiration, Skin ASSESSMENT : 
Based on the objective data described below, the patient presents with functional decline since initial assessment as pt had EGD for source of GIB which progressed to Colonoscopy in which colon CA found. Pt now s/p R colectomy with significant decline in ADLs especially BLE ADLs. Goals adjusted to reflect change in status. Pt continues to be highly distractible and limited by confusion/dementia. Pt w/ multiple family members creating distractions for patient causing limited participation. Pt's vitals initially at 123 HR/94% O2/ 109/87 B/P with increase in HR to 147 during standing and irregular beat as well as decrease in SPO2 to 92%; s/p rest, pt resumed vitals to 119 HR/97% O2 Patient will benefit from skilled intervention to address the above impairments. Patients rehabilitation potential is considered to be Fair Factors which may influence rehabilitation potential include:  
[]                None noted [x]                Mental ability/status [x]                Medical condition []                Home/family situation and support systems []                Safety awareness [x]                Pain tolerance/management 
[]                Other: PLAN : 
Recommendations and Planned Interventions: 
[x]                  Self Care Training                  [x]           Therapeutic Activities [x]                  Functional Mobility Training    [x]           Cognitive Retraining [x]                  Therapeutic Exercises           [x]           Endurance Activities [x]                  Balance Training                   [x]           Neuromuscular Re-Education [x]                  Visual/Perceptual Training     [x]      Home Safety Training 
[x]                  Patient Education                 [x]           Family Training/Education []                  Other (comment): Frequency/Duration: Patient will be followed by occupational therapy 1-2 times per day/2-4 days per week to address goals. Discharge Recommendations: Rehab Further Equipment Recommendations for Discharge: To Be Determined (TBD) at next level of care SUBJECTIVE:  
Patient stated What else I gotta do for you to leave.  OBJECTIVE DATA SUMMARY:  
Hospital course since last seen and reason for reevaluation: s/p R colectomy for colon CA Eval Complexity: History: HIGH Complexity : Extensive review of history including physical, cognitive and psychosocial history ; Examination: HIGH Complexity : 5 or more performance deficits relating to physical, cognitive , or psychosocial skils that result in activity limitations and / or participation restrictions; Decision Making:HIGH Complexity : Patient presents with comorbidities that affect occupational performance. Signifigant modification of tasks or assistance (eg, physical or verbal) with assessment (s) is necessary to enable patient to complete evaluation Cognitive/Behavioral Status: 
Neurologic State: Alert, Confused Orientation Level: Disoriented to time, Oriented to person, Oriented to place, Oriented to situation Cognition: Decreased attention/concentration, Decreased command following, Impulsive, Memory loss, Poor safety awareness, Impaired decision making Safety/Judgement: Decreased awareness of need for assistance, Decreased awareness of need for safety, Decreased insight into deficits Skin: Abdominal incision w/ dressing Edema: minimal abdominal edema associated w/ recent surgery Vision/Perceptual:   
 limited visual scanning; difficult to assess Coordination: 
Coordination: Generally decreased, functional 
Fine Motor Skills-Upper: Left Intact; Right Intact Gross Motor Skills-Upper: Left Intact; Right Intact Balance: 
Sitting: Impaired;High guard Standing: Impaired; With support Strength: 
Strength: Generally decreased, functional 
Tone & Sensation: 
Tone: Normal 
Sensation: Intact Range of Motion: 
AROM: Generally decreased, functional 
PROM: Generally decreased, functional 
  
Functional Mobility and Transfers for ADLs: 
Transfers: 
Sit to Stand: Minimum assistance; Moderate assistance; Additional time; Adaptive equipment Bed to Chair: Minimum assistance; Moderate assistance ADL Assessment: 
Feeding: Contact guard assistance Oral Facial Hygiene/Grooming: Minimum assistance; Additional time Bathing: Maximum assistance Upper Body Dressing: Minimum assistance Lower Body Dressing: Maximum assistance; Total assistance Toileting: Maximum assistance; Total assistance ADL Intervention: 
Feeding Drink to Mouth: Supervision/set-up; Contact guard assistance Grooming Washing Hands: Supervision/set-up (wipes) Upper Body Dressing Assistance Hospital Gown: Moderate assistance Lower Body Dressing Assistance Socks: Total assistance (dependent) Toileting Toileting Assistance:  (Mackenzie) Cognitive Retraining Orientation Retraining: Reorienting;Situation;Time 
Problem Solving: Inductive reason; Identifying the task; Identifying the problem;General alternative solution;Deductive reason; Awareness of environment Executive Functions: Executing cognitive plans;Managing time;Regulating behavior Organizing/Sequencing: Breaking task down;Prioritizing Attention to Task: Distractibility; Single task Maintains Attention For (Time):  (<15 seconds) Following Commands: Awareness of environment Safety/Judgement: Decreased awareness of need for assistance;Decreased awareness of need for safety;Decreased insight into deficits Cues: Tactile cues provided;Verbal cues provided;Visual cues provided Pain: 
Pre-treatment: 5/10 Post-treatment: 5/10 Activity Tolerance:  
Patient able to stand <1 minute(s). Patient able to complete ADLs with constant rest breaks. Patient limited by cognition/strength/balance/pain/vision/motivation. Patient unsteady Please refer to the flowsheet for vital signs taken during this treatment. After treatment:  
[x] Patient left in no apparent distress sitting up in chair 
[] Patient left in no apparent distress in bed 
[x] Call bell left within reach [x] Nursing notified/Tremayne [x] Caregiver present/multiple family members 
[] Bed alarm activated COMMUNICATION/EDUCATION: Tremayne/EDWINA [x]    Home safety education was provided and the patient/caregiver indicated understanding. [x]    Patient/family have participated as able in goal setting and plan of care. [x]    Patient/family agree to work toward stated goals and plan of care. []    Patient understands intent and goals of therapy, but is neutral about his/her participation. []    Patient is unable to participate in goal setting and plan of care. Thank you for this referral. 
Milla Velez, OTR/L Time Calculation: 14 mins

## 2018-10-10 NOTE — PROGRESS NOTES
1516:  [facility] Awaiting patient arrival to unit. 1600:  Patient arrived to unit with akash Hawkins, from ICU via bed. Skin assessment performed, midline dressing to abdomen. Dry, clean, and intact. Patient has no complaints of pain or discomfort at the time, family members at bedside. Patient oriented to room, telephone, bed, call bell, television, and bathroom. Whiteboard updated, bed at the lowest position with call bell within reach. Bedside and Verbal shift change report given to Timur Miller RN (oncoming nurse) by Narendra Espinal RN 
 (offgoing nurse). Report included the following information SBAR, Kardex, ED Summary, Procedure Summary, Intake/Output, MAR, Med Rec Status, Cardiac Rhythm ST and Alarm Parameters .

## 2018-10-10 NOTE — PROGRESS NOTES
Problem: Mobility Impaired (Adult and Pediatric) Goal: *Acute Goals and Plan of Care (Insert Text) Physical Therapy Goals Initiated 10/1/2018 and to be accomplished within 7 day(s) 1. Patient will move from supine <> sit with S in prep for out of bed activity and change of position. 2.  Patient will perform sit<> stand with S/RW in prep for transfers/ambulation. 3.  Patient will transfer from bed <> chair with S/RW for time up in chair for completion of ADL activity. 4.  Patient will ambulate 100 feet with S/RW without LOB/path deviatons for improved functional mobility/qait/safe discharge. Reviewed and updated 10/6/2018 and to be accomplished within 7 day(s) 1. Patient will move from supine to sit and sit to supine  in bed with modified independence. 2.  Patient will transfer from bed to chair and chair to bed with modified independence using the least restrictive device. 3.  Patient will perform sit to stand with modified independence. 4.  Patient will ambulate with modified independence for 300 feet with the least restrictive device. 5.  Patient will ascend/descend 1 stairs without handrail(s) with supervision. Outcome: Progressing Towards Goal 
physical Therapy TREATMENT Patient: Abhilash Chau (46 y.o. female) Date: 10/10/2018 Diagnosis: Stroke ([address] 
anemia BLEEDING OF COLON Syncope Procedure(s) (LRB): 
RIGHT COLECTOMY,RESECTION OF MESENTERIC MASS, SMALL BOWEL RESECTION (N/A) 2 Days Post-Op Precautions: Fall, Aspiration, Skin Chart, physical therapy assessment, plan of care and goals were reviewed. ASSESSMENT: 
Pt in chair upon entering. Pt agreeable to participate with PT. Pt with weakness present, transfers required Mod-Christine for lift and balance. Pt stood for ~1 minutes, tachy to 147. After short sitting rest pt able to tolerate additional standing attempt with Christine and performed 2 steps fwd/bwd, following short distance amb HR 130s.  Returned to <120 with sitting rest. Will continue to progress as pt tolerates. Progression toward goals: 
[]      Improving appropriately and progressing toward goals [x]      Improving slowly and progressing toward goals 
[]      Not making progress toward goals and plan of care will be adjusted PLAN: 
Patient continues to benefit from skilled intervention to address the above impairments. Continue treatment per established plan of care. Discharge Recommendations:  Rehab Further Equipment Recommendations for Discharge:  TBD SUBJECTIVE:  
Patient stated I am doing ok, I could try to walk a little.  OBJECTIVE DATA SUMMARY:  
Critical Behavior: 
Neurologic State: Alert, Confused Orientation Level: Disoriented to time, Oriented to person, Oriented to place, Oriented to situation Cognition: Decreased attention/concentration, Decreased command following, Impulsive, Memory loss, Poor safety awareness, Impaired decision making Safety/Judgement: Decreased awareness of need for assistance, Decreased awareness of need for safety, Decreased insight into deficits Functional Mobility Training: 
 Transfers: 
Sit to Stand: Moderate assistance Stand to Sit: Moderate assistance Bed to Chair: Minimum assistance; Moderate assistance Balance: 
Sitting: Intact; With support Standing: Impaired; With support Standing - Static: Poor Standing - Dynamic : Poor Ambulation/Gait Training: 
Distance (ft): 2 Feet (ft) Assistive Device: Gait belt;Walker, rolling Ambulation - Level of Assistance: Moderate assistance Gait Abnormalities: Decreased step clearance; Step to gait Speed/Peggy: Slow Step Length: Right shortened;Left shortened Pain: 
Pain Scale 1: Numeric (0 - 10) Pain Intensity 1: 3 Activity Tolerance:  
Fair Please refer to the flowsheet for vital signs taken during this treatment. After treatment:  
[] Patient left in no apparent distress sitting up in chair 
[x] Patient left in no apparent distress in bed [x] Call bell left within reach [x] Nursing notified 
[] Caregiver present 
[] Bed alarm activated Lacey Ferreira Time Calculation: 17 mins

## 2018-10-10 NOTE — PROGRESS NOTES
Surgery Post Operative Day Note Maia Law Yadiel Admit Date: 9/30/2018 Surgery Date:  [unfilled] 
POD: 2 Days Post-Op Subjective:  
  
Patient present conditions: No significant medical complaints Objective:  
 
Patient Vitals for the past 24 hrs: 
 BP Temp Pulse Resp SpO2 Height Weight 10/10/18 1613 107/52 98.4 °F (36.9 °C) 95 20 95 % - -  
10/10/18 1600 - - - - 95 % - -  
10/10/18 1500 107/52 98.5 °F (36.9 °C) 96 23 99 % - -  
10/10/18 1400 104/45 - 96 22 99 % - -  
10/10/18 1300 101/61 - 100 21 99 % - -  
10/10/18 1234 - - - - 100 % - -  
10/10/18 1200 91/57 - 96 25 98 % - -  
10/10/18 1146 - 98.5 °F (36.9 °C) - - - - -  
10/10/18 1100 91/76 - (!) 106 22 98 % - -  
10/10/18 1016 109/87 - (!) 147 - 97 % - -  
10/10/18 1000 - - (!) 116 29 99 % - -  
10/10/18 0900 118/60 - (!) 113 25 97 % - -  
10/10/18 0804 - - - - 98 % - -  
10/10/18 0800 122/65 - (!) 110 27 97 % - -  
10/10/18 0741 - 98.3 °F (36.8 °C) - - - - -  
10/10/18 0700 123/69 - (!) 112 27 97 % - -  
10/10/18 0630 125/61 - (!) 109 25 96 % - -  
10/10/18 0600 120/69 - (!) 111 23 97 % - -  
10/10/18 0530 124/67 - (!) 115 20 98 % - -  
10/10/18 0500 115/74 - (!) 116 25 97 % - -  
10/10/18 0430 123/74 - (!) 118 23 98 % - -  
10/10/18 0400 112/64 - (!) 116 25 94 % - -  
10/10/18 0342 - 98.8 °F (37.1 °C) (!) 119 22 98 % - -  
10/10/18 0330 110/66 98.8 °F (37.1 °C) (!) 112 25 98 % - -  
10/10/18 0300 122/65 - (!) 114 24 98 % - -  
10/10/18 0230 127/62 - (!) 118 25 98 % - -  
10/10/18 0200 122/63 - (!) 117 27 96 % - -  
10/10/18 0130 123/73 - (!) 118 22 97 % - -  
10/10/18 0100 141/63 - (!) 117 25 96 % - -  
10/10/18 0030 123/69 - (!) 127 22 100 % - -  
10/10/18 0000 135/69 - (!) 118 25 98 % - -  
10/09/18 2330 122/61 - (!) 116 26 95 % - -  
10/09/18 2300 126/68 98.6 °F (37 °C) (!) 117 24 94 % - -  
10/09/18 2230 123/68 - (!) 117 27 97 % - -  
10/09/18 2200 118/66 - (!) 118 25 98 % - -  
10/09/18 2130 123/58 - (!) 120 23 98 % - -  
 10/09/18 2100 136/73 - (!) 129 27 93 % - -  
10/09/18 2030 (!) 152/123 - (!) 124 28 92 % - -  
10/09/18 2000 132/74 - (!) 122 25 98 % - -  
10/09/18 1930 135/70 98.7 °F (37.1 °C) (!) 121 28 98 % 5' 3\" (1.6 m) 92 kg (202 lb 13.2 oz) 10/09/18 1905 - 98.7 °F (37.1 °C) - - - - -  
10/09/18 1900 122/65 - (!) 122 28 98 % - -  
10/09/18 1830 131/83 - (!) 120 29 97 % - -  
  
10/08 1901 - 10/10 0700 In: 6203.7 [P.O.:240; I.V.:5963.7] Out: 1735 [[id number]] Recent Results (from the past 24 hour(s)) GLUCOSE, POC Collection Time: 10/09/18 11:22 PM  
Result Value Ref Range Glucose (POC) 130 (H) 70 - 110 mg/dL GLUCOSE, POC Collection Time: 10/10/18  5:40 AM  
Result Value Ref Range Glucose (POC) 161 (H) 70 - 110 mg/dL GLUCOSE, POC Collection Time: 10/10/18  6:57 AM  
Result Value Ref Range Glucose (POC) 165 (H) 70 - 110 mg/dL METABOLIC PANEL, BASIC Collection Time: 10/10/18  7:41 AM  
Result Value Ref Range Sodium 141 136 - 145 mmol/L Potassium 4.4 3.5 - 5.5 mmol/L Chloride 105 100 - 108 mmol/L  
 CO2 33 (H) 21 - 32 mmol/L Anion gap 3 3.0 - 18 mmol/L Glucose 153 (H) 74 - 99 mg/dL BUN 11 7.0 - 18 MG/DL Creatinine 1.26 0.6 - 1.3 MG/DL  
 BUN/Creatinine ratio 9 (L) 12 - 20 GFR est AA 49 (L) >60 ml/min/1.73m2 GFR est non-AA 40 (L) >60 ml/min/1.73m2 Calcium 8.5 8.5 - 10.1 MG/DL  
CBC WITH AUTOMATED DIFF Collection Time: 10/10/18  7:41 AM  
Result Value Ref Range WBC 9.3 4.6 - 13.2 K/uL  
 RBC 3.93 (L) 4.20 - 5.30 M/uL HGB 8.4 (L) 12.0 - 16.0 g/dL HCT 28.7 (L) 35.0 - 45.0 % MCV 73.0 (L) 74.0 - 97.0 FL  
 MCH 21.4 (L) 24.0 - 34.0 PG  
 MCHC 29.3 (L) 31.0 - 37.0 g/dL RDW 27.1 (H) 11.6 - 14.5 % PLATELET 303 173 - 225 K/uL MPV 9.1 (L) 9.2 - 11.8 FL  
 NEUTROPHILS 77 (H) 40 - 73 % LYMPHOCYTES 11 (L) 21 - 52 % MONOCYTES 12 (H) 3 - 10 % EOSINOPHILS 0 0 - 5 % BASOPHILS 0 0 - 2 %  
 ABS. NEUTROPHILS 7.2 1.8 - 8.0 K/UL ABS. LYMPHOCYTES 1.0 0.9 - 3.6 K/UL  
 ABS. MONOCYTES 1.1 0.05 - 1.2 K/UL  
 ABS. EOSINOPHILS 0.0 0.0 - 0.4 K/UL  
 ABS. BASOPHILS 0.0 0.0 - 0.1 K/UL  
 RBC COMMENTS ANISOCYTOSIS 1+ 
    
 RBC COMMENTS POLYCHROMASIA 1+ 
    
 RBC COMMENTS TARGET CELLS 
FEW OVALOCYTES 1+ RBC COMMENTS HYPOCHROMIA 1+ 
    
 DF AUTOMATED    
GLUCOSE, POC Collection Time: 10/10/18 11:25 AM  
Result Value Ref Range Glucose (POC) 165 (H) 70 - 110 mg/dL GLUCOSE, POC Collection Time: 10/10/18  5:03 PM  
Result Value Ref Range Glucose (POC) 145 (H) 70 - 110 mg/dL Abdomen:             Abdominal Assessment: Obese, Pannus soft minimal appropriate tenderness. Wound: cdi Status:                   Peripheral IV 10/08/18 Left Arm-Dressing Status: Clean, dry, & intact Penrose Drain 10/08/18 Abdomen-Dressing Status: Clean, dry, & intact Peripheral IV 10/08/18 Right Hand-Dressing Status: Clean, dry, & intact [REMOVED] Peripheral IV 09/30/18 Left Antecubital-Dressing Status: Clean, dry, & intact Dressing Changes:Peripheral IV 10/08/18 Left Arm-Dressing Status: Clean, dry, & intact Penrose Drain 10/08/18 Abdomen-Dressing Status: Clean, dry, & intact Peripheral IV 10/08/18 Right Hand-Dressing Status: Clean, dry, & intact [REMOVED] Peripheral IV 09/30/18 Left Antecubital-Dressing Status: Clean, dry, & intact Pain Level:             
 
Nausea and Vomiting: minus Stool: minus and burping - no flatus Assessment:  
 
Progress: good Plan:  
 
Diet: clears until bowel function resumes then regular diet Plan for continued care: doing well. management per medicine. PT/OT Signed By: Alexey Shane DO

## 2018-10-10 NOTE — PROGRESS NOTES
[facility] Pulmonary, Critical Care, and Sleep Medicine Name: Jean Carlos Polo MRN: [id number] : 1932 Hospital: [facility] [place] Date: 10/10/2018 PCCM Note Subjective/History:  
 
Jean Carlos Polo is a 80 y.o. female with PMHx significant for HTN, DM, AFib, obesity, former smoker, COPD, restrictive ventilatory defect and reduced DLCO on PFT  due to chronic atelectasis of RT middle and LT lingula lobes; chronic hypoxic respiratory failure on home O2 at 2L, chronic mediastinal and hilar lymphadenopathy-suspected sarcoidosis, but no tissue diagnosis due to old age. She was admitted with syncope/anemia and found to have colon mass on w/u likely colon cancer. 10/10/18: 
S/p surgery 10/8 evening - right colectomy. Patient was left intubated due to respiratory risks; extubated yesterday am. 
She is doing well. No cough or SOB No abd pains; + flatus Afebrile; mild sinus tach; UOP 1.2 L yesterday No Known Allergies Past Medical History:  
Diagnosis Date  Acid reflux  Arthritis  Atrial fibrillation ([address]  Autoimmune disease ([address] Sarcoidosis  Diabetes ([facility]  High cholesterol  Hypertension  Sarcoidosis of lung ([facility]  Stroke ([facility] Past Surgical History:  
Procedure Laterality Date  COLONOSCOPY N/A 10/3/2018 COLONOSCOPY, POLYPECTOMY, BIOPSY, ENDOSCOPIC TATTOO performed by Abby Lew MD at [place] [id number] Social History Substance Use Topics  Smoking status: Former Smoker Packs/day: 2.50 Years: 30.00 Types: Cigarettes  Smokeless tobacco: Never Used  Alcohol use No  
  
 
Current Facility-Administered Medications Medication Dose Route Frequency  albuterol (PROVENTIL VENTOLIN) nebulizer solution 2.5 mg  2.5 mg Inhalation PRN  
  naloxone (NARCAN) injection 0.2 mg  0.2 mg IntraVENous PRN  
 flumazenil (ROMAZICON) 0.1 mg/mL injection 0.2 mg  0.2 mg IntraVENous Multiple  diphenhydrAMINE (BENADRYL) injection 12.5 mg  12.5 mg IntraVENous Multiple  glucose chewable tablet 16 g  4 Tab Oral PRN  
 glucagon (GLUCAGEN) injection 1 mg  1 mg IntraMUSCular PRN  
 dextrose (D50W) injection syrg 12.5-25 g  25-50 mL IntraVENous PRN  
 insulin lispro (HUMALOG) injection   SubCUTAneous Q6H  
 albuterol-ipratropium (DUO-NEB) 2.5 MG-0.5 MG/3 ML  3 mL Nebulization QID RT  
 0.9% sodium chloride infusion 250 mL  250 mL IntraVENous PRN  
 lactated Ringers infusion  50 mL/hr IntraVENous CONTINUOUS  
 simvastatin (ZOCOR) tablet 20 mg  20 mg Oral QHS  fluticasone (FLONASE) 50 mcg/actuation nasal spray 2 Spray  2 Spray Both Nostrils DAILY  metoclopramide HCl (REGLAN) injection 5 mg  5 mg IntraVENous Q6H PRN  pantoprazole (PROTONIX) tablet 40 mg  40 mg Oral ACB  polyethylene glycol (MIRALAX) packet 17 g  17 g Oral BID  albuterol (PROVENTIL HFA, VENTOLIN HFA, PROAIR HFA) inhaler 1-2 Puff  1-2 Puff Inhalation Q6H PRN  
 sodium chloride (NS) flush 5-10 mL  5-10 mL IntraVENous Q8H  
 sodium chloride (NS) flush 5-10 mL  5-10 mL IntraVENous PRN  
 aspirin (ASPIRIN) tablet 325 mg  325 mg Oral DAILY  glucose chewable tablet 16 g  4 Tab Oral PRN  
 glucagon (GLUCAGEN) injection 1 mg  1 mg IntraMUSCular PRN  
 dextrose (D50W) injection syrg 12.5-25 g  25-50 mL IntraVENous PRN  
 atorvastatin (LIPITOR) tablet 80 mg  80 mg Oral DAILY  0.9% sodium chloride infusion 250 mL  250 mL IntraVENous PRN Review of Systems: 
Ears, nose, mouth, throat, and face: No epistaxis, no difficulty in swallowing Respiratory: as above Cardiovascular: no chest pain or palpitations, no syncope Gastrointestinal: no abd pain or vomitting or diarrhea, no bleeding symptoms Genitourinary: No urinary symptoms Integument/breast: No ulcers Musculoskeletal:Neg 
Neurological: No focal weakness or seizures or headaches Behvioral/Psych: No anxiety or depression Constitutional: No fever or chills Objective:  
Vital Signs:   
Visit Vitals  /61  Pulse (!) 109  Temp 98.3 °F (36.8 °C)  Resp 25  
 Ht 5' 3\" (1.6 m)  Wt 92 kg (202 lb 13.2 oz)  SpO2 98%  BMI 35.93 kg/m2 O2 Device: Hi flow nasal cannula O2 Flow Rate (L/min): 30 l/min Temp (24hrs), Av.6 °F (37 °C), Min:98.2 °F (36.8 °C), Max:98.8 °F (37.1 °C) Intake/Output:  
Last shift:        
Last 3 shifts: 10/08 1901 - 10/10 0700 In: 6203.7 [P.O.:240; I.V.:5963.7] Out: 1735 [IGPBK:5253] Intake/Output Summary (Last 24 hours) at 10/10/18 8420 Last data filed at 10/10/18 8021 Gross per 24 hour Intake          1821.33 ml Output             1225 ml Net           596.33 ml Physical Exam:  
Comfortable; now on 2 L nc only; acyanotic HEENT: pupils not dilated, reactive, no scleral jaundice Neck: No adenopathy or thyroid swelling CVS: S1S2 no murmurs; JVD not elevated RS: Mod air entry bilaterally, decreased BS at bases, no wheezes or crackles Abd: soft, non tender, no hepatosplenomegaly, no abd distension, no guarding or rigidity, bowel sounds heard Laparotomy dressing with no bleeding Neuro: awake, alert, non focal 
Extrm: no leg edema or swelling or clubbing Skin: no rash Lymphatic: no cervical or supraclavicular adenopathy 
  
 
Data:  
   
Recent Results (from the past 24 hour(s)) GLUCOSE, POC Collection Time: 10/09/18 12:13 PM  
Result Value Ref Range Glucose (POC) 204 (H) 70 - 110 mg/dL GLUCOSE, RANDOM Collection Time: 10/09/18  1:22 PM  
Result Value Ref Range Glucose 283 (H) 74 - 99 mg/dL GLUCOSE, POC Collection Time: 10/09/18  4:09 PM  
Result Value Ref Range Glucose (POC) 211 (H) 70 - 110 mg/dL GLUCOSE, POC Collection Time: 10/09/18  5:49 PM  
Result Value Ref Range Glucose (POC) 165 (H) 70 - 110 mg/dL GLUCOSE, POC Collection Time: 10/09/18 11:22 PM  
Result Value Ref Range Glucose (POC) 130 (H) 70 - 110 mg/dL GLUCOSE, POC Collection Time: 10/10/18  5:40 AM  
Result Value Ref Range Glucose (POC) 161 (H) 70 - 110 mg/dL GLUCOSE, POC Collection Time: 10/10/18  6:57 AM  
Result Value Ref Range Glucose (POC) 165 (H) 70 - 110 mg/dL METABOLIC PANEL, BASIC Collection Time: 10/10/18  7:41 AM  
Result Value Ref Range Sodium 141 136 - 145 mmol/L Potassium 4.4 3.5 - 5.5 mmol/L Chloride 105 100 - 108 mmol/L  
 CO2 33 (H) 21 - 32 mmol/L Anion gap 3 3.0 - 18 mmol/L Glucose 153 (H) 74 - 99 mg/dL BUN 11 7.0 - 18 MG/DL Creatinine 1.26 0.6 - 1.3 MG/DL  
 BUN/Creatinine ratio 9 (L) 12 - 20 GFR est AA 49 (L) >60 ml/min/1.73m2 GFR est non-AA 40 (L) >60 ml/min/1.73m2 Calcium 8.5 8.5 - 10.1 MG/DL  
CBC WITH AUTOMATED DIFF Collection Time: 10/10/18  7:41 AM  
Result Value Ref Range WBC 9.3 4.6 - 13.2 K/uL  
 RBC 3.93 (L) 4.20 - 5.30 M/uL HGB 8.4 (L) 12.0 - 16.0 g/dL HCT 28.7 (L) 35.0 - 45.0 % MCV 73.0 (L) 74.0 - 97.0 FL  
 MCH 21.4 (L) 24.0 - 34.0 PG  
 MCHC 29.3 (L) 31.0 - 37.0 g/dL RDW 27.1 (H) 11.6 - 14.5 % PLATELET 736 081 - 888 K/uL MPV 9.1 (L) 9.2 - 11.8 FL  
 NEUTROPHILS 77 (H) 40 - 73 % LYMPHOCYTES 11 (L) 21 - 52 % MONOCYTES 12 (H) 3 - 10 % EOSINOPHILS 0 0 - 5 % BASOPHILS 0 0 - 2 %  
 ABS. NEUTROPHILS 7.2 1.8 - 8.0 K/UL  
 ABS. LYMPHOCYTES 1.0 0.9 - 3.6 K/UL  
 ABS. MONOCYTES 1.1 0.05 - 1.2 K/UL  
 ABS. EOSINOPHILS 0.0 0.0 - 0.4 K/UL  
 ABS. BASOPHILS 0.0 0.0 - 0.1 K/UL  
 RBC COMMENTS ANISOCYTOSIS 1+ 
    
 RBC COMMENTS POLYCHROMASIA 1+ 
    
 RBC COMMENTS TARGET CELLS 
FEW OVALOCYTES 1+ RBC COMMENTS HYPOCHROMIA 1+ 
    
 DF AUTOMATED Chemistry Recent Labs 10/10/18 
 5088  10/09/18 
 1322  10/09/18 
 0020  10/08/18 
 5640 GLU  153*  283*  181*  121* NA  141   --   141  142  
K  4.4   --   4.5  4.6 CL  105   --   106  105 CO2  33*   --   30  30 BUN  11   --   14  10 CREA  1.26   --   1.40*  1.21  
CA  8.5   --   8.5  8.8 AGAP  3   --   5  7 BUCR  9*   --   10*  8* AP   --    --   49   --   
TP   --    --   5.9*   --   
ALB   --    --   2.6*   --   
GLOB   --    --   3.3   --   
AGRAT   --    --   0.8   -- Lactic Acid No results found for: LAC No results for input(s): LAC in the last 72 hours. Liver Enzymes Recent Labs 10/09/18 
 0020 TP  5.9* ALB  2.6*  
GLOB  3.3 AGRAT  0.8 SGOT  30 AP  49 CBC w/Diff Recent Labs 10/10/18 
 4224  10/09/18 
 0020  10/08/18 
 1636  10/08/18 
 8155 WBC  9.3  8.9   --   5.6  
RBC  3.93*  4.35   --   3.69* HGB  8.4*  9.3*  9.4*  7.3* HCT  28.7*  31.6*  31.6*  26.2*  
PLT  196  220   --   241 GRANS  77*   --    --   62  
LYMPH  11*   --    --   22 EOS  0   --    --   4 Cardiac Enzymes No results found for: CPK, CK, CKMMB, CKMB, RCK3, CKMBT, CKNDX, CKND1, YESENIA, TROPT, TROIQ, RENY, TROPT, TNIPOC, BNP, BNPP Micro  No results for input(s): SDES, CULT in the last 72 hours. No results for input(s): CULT in the last 72 hours. ABG Recent Labs 10/09/18 
 0527  10/09/18 
 0004 PHI  7.468*  7.467* PCO2I  40.5  40.1 PO2I  128*  102* HCO3I  29.3*  29.2*  
FIO2I  0.45  0.50 PFT  PFTs 4/14/15 FLOWS: 
FEV1/FVC ratio is preserved        
Pre bronchodilator FEV1 is 0.77 L (51% predicted) Pre bronchodilator FVC is 0.90 L (46% predicted)    
Volumes: 
TLC, RV and VC are decreased TLC is 55% predicted    
Bronchodilator: No bronchodilator improvement seen Diffusion: 
Diffusion Capacity - decreased at 11% predicted Impression: 
Predominantly moderate restrictive defect seen. An additional airflow obstructive defect cannot be ruled out.  
There was no bronchodilator response.   
Severe decrease in uncorrected diffusion capacity    
 
 
 ECHO 9/30/18 Estimated left ventricular ejection fraction is 66 - 70%. Left ventricular mild concentric hypertrophy. Normal left ventricular wall motion, no regional wall motion abnormality noted. Mild (grade 1) left ventricular diastolic dysfunction E/E' ratio is 12. Lella Chelsie Mitral valve non-specific thickening. Mild mitral valve regurgitation. Mild tricuspid valve regurgitation is present. Pulmonary arterial systolic pressure is 35 mmHg. Mild pulmonary hypertension is present. Mild pulmonic valve regurgitation is present. Saline contrast was given to evaluate for intracardiac shunt. Right to left shunt seen at rest.  
 
 
CT chest 10/4/18 Results from Hospital Encounter encounter on 09/30/18 CT CHEST WO CONT Narrative EXAM: CT chest  
 
INDICATION: Shortness of breath. Sarcoidosis. COMPARISON: CTA thorax 2/21/2015 TECHNIQUE: Axial CT imaging from the thoracic inlet through the diaphragm with 
intravenous contrast. Multiplanar reformats were generated. One or more dose 
reduction techniques were used on this CT: automated exposure control, 
adjustment of the mAs and/or kVp according to patient size, and iterative 
reconstruction techniques. The specific techniques used on this CT exam have 
been documented in the patient's electronic medical record. 
 
_______________ FINDINGS: 
 
LUNGS: Bandlike fibrosis bilateral lower lobes and lingula. Paraseptal emphysema 
right apex. PLEURA: Normal. 
 
AIRWAY: Normal. 
 
MEDIASTINUM: Mild cardiomegaly. No pericardial effusion. Dilated main pulmonary 
artery relative to adjacent ascending aorta compatible with pulmonary 
hypertension. LYMPH NODES: ATS 4R adenopathy measuring about 1.4 cm short axis with prior 
measurement 2.5 cm. ATS 3B adenopathy 1 cm with prior measurement 2 cm. ATS 7 
subcarinal adenopathy 1.1 cm with prior measurement 2.5 cm. These findings 
represent a very significant improvement since prior CT. OTHER: No acute or aggressive osseous abnormalities identified. _______________ Impression IMPRESSION: 
 
 
1. Bandlike fibrosis bilateral lower lobes and lingula without change. No acute 
pulmonary process. 2. Mild cardiomegaly. Pulmonary hypertension again evident. 3. Significant improvement in mediastinal adenopathy since prior study. No 
definitive hilar adenopathy remaining. CXR 10/9 Results from Hospital Encounter encounter on 09/30/18 XR CHEST PORT Narrative EXAM:  PORTABLE CHEST INDICATION:  Respiratory failure. TECHNIQUE:  Portable, semierect AP view. COMPARISON:  09/30/2018 
 
____________________ FINDINGS: 
 
SUPPORT DEVICES: Interval placement of endotracheal tube approximately 1.8 cm 
above the yakelin. HEART AND MEDIASTINUM: Left heart border is obscured, however appears stable. LUNGS AND PLEURAL SPACES: Persistent hypoinflation with developing bibasilar 
opacities. No pulmonary edema. No pleural effusion or pneumothorax. BONY THORAX AND SOFT TISSUES: Degenerative changes around the visualized 
shoulders. ____________________ Impression IMPRESSION:   
 
1. Interval placement of endotracheal tube approximately 1.8 cm above the 
yakelin. 2. Persistent pulmonary hypoinflation with developing bibasilar opacities, 
atelectasis or pneumonia. MRI brain 9/30/18: 
1. Possible tiny area of acute right parietal infarction, evident on diffusion 
only without hemorrhage or mass effect. 2. Moderately prominent ischemic white matter change. Ischemic changes in the 
thalami. 3. Chronic left frontal parietal and small right frontal cortical/subcortical 
infarctions. Numerous chronic bilateral cerebellar hemisphere infarctions. IMPRESSION:  
· Vent dependent respiratory failure 10/8/18 - post laparotomy · Adenocarcinoma of colon (syncope and anemia leading to further evaluation and diagnosis of colon mass) - s/p laparatomy and RT colectomy 10/8/18 · Syncope, due to severe anemia. Nuclear stress test negative. · S/p very mild hemoptysis, very mild blood tinged sputum couple times since last couple days. Resolved. CT chest unremarkable for any causes for hemoptysis. · Hx of suspected Sarcoidosis (mediastinal/hilar lymphadenopathy) and chronic bilateral lobar atelectasis · COPD with paraseptal emphysema on CT chest - prior FEV1 of 51% ·  Chronic hypoxic respiratory failure on home O2 2-3 L/m · Former 2 PPD x20 yrs - smoker, quit 40 yrs ago · Tracheobronchomalacia  
· HTN 
· DM 2 
· AFib · Anemia · Obesity · Echo normal LV fn and grade 1 DD  
  
RECOMMENDATIONS:  
RS - stable respirations; HF nc o2 was used since post-op abd state and help prevent resp failure; weaned to nc O2; incent spirometry; OOB as tolerated; PT and OT on case CVS-stable hemodynamically; added low dose metoprolol bid Bronchodilators-start Breo 100 mcg 1 puff daily; prn duonebs On home 02 at 2-3 lit/min at baseline; target O2 sats 90-95% GI - oral clear diet per Surgery team 
Renal-stable renal fn 
Hem: chronic anemia; keep Hb>7 gm/dl; Plts stable DVT Prophylaxis: SCDs GI Prophylaxis: oral pepcid Sedation - propofol - stopped for weaning Pain - low dose percocet DC zarate cath Other issues management by primary team and respective consultants. Further recommendations will be based on the patient's response to recommended treatment and results of the investigation ordered. HOB >=30 degree, aspiration precautions, aggressive pulmonary toileting, incentive spirometry, PT/OT eval and treat, mobilization. Discussed with family - at bedside. Updated management plans. [email or web address].w icu nurse Manolo Pritchard MD 
10/10/2018

## 2018-10-10 NOTE — PROGRESS NOTES
Pharmacy Dosing Services:   Famotidine Indication:   SUP Prophylaxis Previous Regimen Pepcid 20 mg po Bid Serum Creatinine Lab Results Component Value Date/Time Creatinine 1.26 10/10/2018 07:41 AM  
  
Creatinine Clearance Estimated Creatinine Clearance: 34.5 mL/min (based on Cr of 1.26). BUN Lab Results Component Value Date/Time BUN 11 10/10/2018 07:41 AM  
   
 
Dose administration notes:   Changed to Pepcid 20 mg po Daily per renal dosing protocol Plan:  Continue to monitor Manette Bumpers. Lindsay Noguera  [phone]

## 2018-10-11 NOTE — PROGRESS NOTES
Hospitalist Progress Note Patient: Leah Godinez MRN: [id number]  CSN: [id number] YOB: 1932  Age: 80 y.o. Sex: female DOA: 9/30/2018 LOS:  LOS: 11 days Chief Complaint: 
Colon cancer Assessment/Plan Colon cancer s/p surgery-adenocarcinoma, carcinoid tumor Ac resp failure requiring post op ventilation, now extubated and weaned to regular NC 02 Chronic resp failure with presumed sarcoid and hx of tobacco use-on home 02 Ac blood loss anemia-stable H&H Diabetes, type 2-stable BG Morbid obesity Weakness-working with PT, will go to rehab H/o cva Diet as per surgery Advance as fransisca Drain out prior to d/c Rehab on d/c 24-48 hrs Disposition : 
Patient Active Problem List  
Diagnosis Code  Dyspnea R06.00  Troponin level elevated R74.8  Wheezing R06.2  Sarcoidosis D86.9  Diabetes ([address] E11.9  Hypoxia R09.02  Stroke ([address] I63.9  Syncope R55  Chronic respiratory failure with hypoxia, on home O2 therapy (HCC) J96.11, Z99.81  
 HTN (hypertension) I10  Type II diabetes mellitus, uncontrolled ([address] E11.65  Severe anemia D64.9  Sarcoidosis of lung ([facility] D86.0  
 Colonic mass K63.9 Subjective: 
Denies CP, SOB, nausea, HA Review of systems: 
 
Constitutional: denies fevers, chills, myalgias Respiratory: denies cough Cardiovascular: denies  palpitations Gastrointestinal: denies nausea, vomiting, diarrhea Vital signs/Intake and Output: 
Visit Vitals  BP 96/56 (BP 1 Location: Left arm, BP Patient Position: At rest)  Pulse 89  Temp 98.7 °F (37.1 °C)  Resp 20  
 Ht 5' 3\" (1.6 m)  Wt 92.9 kg (204 lb 12.8 oz)  SpO2 98%  BMI 36.28 kg/m2 Current Shift:    
Last three shifts:  10/09 1901 - 10/11 0700 In: 855.1 [I.V.:855.1] Out: 1025 [Urine:1025] Exam: 
 
General:elderly BF, alert, NAD, OX3 
CVS:Regular rate and rhythm, no M/R/G, S1/S2 heard, no thrill Lungs:Clear to auscultation bilaterally, no wheezes, rhonchi, or rales Abdomen: Soft, Nontender, dressed, No distention, Normal Bowel sounds, No hepatomegaly Extremities: No C/C/E, pulses palpable 2+ Neuro:grossly normal , follows commands Psych:appropriate Labs: Results:  
   
Chemistry Recent Labs 10/11/18 
 0030  10/10/18 
 0741  10/09/18 
 1322  10/09/18 
 0020 GLU  132*  153*  283*  181* NA  140  141   --   141  
K  4.3  4.4   --   4.5  
CL  106  105   --   106 CO2  31  33*   --   30 BUN  11  11   --   14  
CREA  1.14  1.26   --   1.40* CA  8.3*  8.5   --   8.5 AGAP  3  3   --   5  
BUCR  10*  9*   --   10* AP  53   --    --   49  
TP  5.5*   --    --   5.9* ALB  2.2*   --    --   2.6*  
GLOB  3.3   --    --   3.3 AGRAT  0.7*   --    --   0.8 CBC w/Diff Recent Labs 10/11/18 
 0030  10/10/18 
 0741  10/09/18 
 0020 WBC  9.1  9.3  8.9  
RBC  3.67*  3.93*  4.35  
HGB  7.9*  8.4*  9.3* HCT  27.1*  28.7*  31.6*  
PLT  189  196  220 GRANS   --   77*   --   
LYMPH   --   11*   --   
EOS   --   0   --   
  
Cardiac Enzymes No results for input(s): CPK, CKND1, YESENIA in the last 72 hours. No lab exists for component: Gwynneth Car Coagulation No results for input(s): PTP, INR, APTT in the last 72 hours. No lab exists for component: INREXT Lipid Panel Lab Results Component Value Date/Time Cholesterol, total 175 09/30/2018 10:00 AM  
 HDL Cholesterol 81 (H) 09/30/2018 10:00 AM  
 LDL, calculated 70 09/30/2018 10:00 AM  
 VLDL, calculated 24 09/30/2018 10:00 AM  
 Triglyceride 120 09/30/2018 10:00 AM  
 CHOL/HDL Ratio 2.2 09/30/2018 10:00 AM  
  
BNP No results for input(s): BNPP in the last 72 hours. Liver Enzymes Recent Labs 10/11/18 
 0030 TP  5.5* ALB  2.2* AP  53 SGOT  11* Thyroid Studies No results found for: T4, T3U, TSH, TSHEXT Procedures/imaging: see electronic medical records for all procedures/Xrays and details which were not copied into this note but were reviewed prior to creation of Plan Дмитрий Ortiz MD

## 2018-10-11 NOTE — PROGRESS NOTES
Transition of care: anticipate Rehab when medically cleared Met with patient and both daughters at bedside. Plan for d/c to rehab. Patient has been accepted at [facility], [place], [facility]. Anticipate d/c in next few days. [facility] is able to accomodate. She will need transportation to rehab when ready for d/c. No other needs at this time Care Management Interventions PCP Verified by CM: Yes Mode of Transport at Discharge: BLS Transition of Care Consult (CM Consult): SNF Partner SNF: Yes Physical Therapy Consult: Yes Occupational Therapy Consult: Yes Current Support Network: Relative's Home Confirm Follow Up Transport: Family Plan discussed with Pt/Family/Caregiver: Yes Freedom of Choice Offered: Yes Discharge Location Discharge Placement: Skilled nursing facility

## 2018-10-11 NOTE — PROGRESS NOTES
ARU/IPR REFERRAL CONTACT NOTE [id number] Melodie Vivar for Physical Rehabilitation Re: Ramo Newsome IP Consult for IP Rehab Screen received. Our admissions team has reviewed Ms Mauro Sally care and are declining admission to ARU at this time. Our team is recommending SNF with extended treatment opportunities. Thank you for the consult. [facility] [phone]

## 2018-10-11 NOTE — PROGRESS NOTES
Preliminary path: 
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 Change dressing daily - leave penrose, may be removed prior to DC Laine Montez DO 
10/11/2018 
1:24 PM 
 
 
 
PATH 
 
  * * *FINAL DIAGNOSIS* * * A:  Mesenteric mass: 
     Metastatic well-differentiated neuroendocrine carcinoma, 
          GRADE 3 (based on KI-67 index).       
B:  Right colectomy: 
     Adenocarcinoma of colon. SPECIMEN 
    Procedure: Right hemicolectomy 
 TUMOR 
    Tumor Site: Right (ascending) colon 
    Histologic Type: Adenocarcinoma 
    Histologic Grade: G1: Well differentiated 
    Tumor Size: 4.5 Centimeters (cm) 
    Tumor Deposits: Not identified 
 Tumor Extent 
    Tumor Extension: Tumor invades through the muscularis propria into 
      pericolorectal tissue 
    Macroscopic Tumor Perforation: Not identified 
 Accessory Findings 
    Lymphovascular Invasion: Present 
    Perineural Invasion: Not identified 
    Treatment Effect: No known presurgical therapy 
 MARGINS 
    Margins: All margins are uninvolved by invasive carcinoma, high- 
      grade dysplasia, intramucosal adenocarcinoma, and adenoma 
        Margins Examined: Proximal, Distal, Radial or Mesenteric 
        Distance of Tumor from Radial Margin: 3.5 Centimeters (cm) 
 LYMPH NODES 
    Number of Lymph Nodes Involved: 2 
    Number of Lymph Nodes Examined: 21 
 PATHOLOGIC STAGE CLASSIFICATION (pTNM, AJCC 8th Edition) 
    Primary Tumor (pT): pT3 
    Regional Lymph Nodes (pN): pN1b 
      
C:  Small bowel: 
     Well-differentiated neuroendocrine carcinoma, 
          GRADE 2 (BASED ON Ki-67 INDEX).

## 2018-10-11 NOTE — PROGRESS NOTES
Problem: Mobility Impaired (Adult and Pediatric) Goal: *Acute Goals and Plan of Care (Insert Text) Physical Therapy Goals Initiated 10/1/2018 and to be accomplished within 7 day(s) 1. Patient will move from supine <> sit with S in prep for out of bed activity and change of position. 2.  Patient will perform sit<> stand with S/RW in prep for transfers/ambulation. 3.  Patient will transfer from bed <> chair with S/RW for time up in chair for completion of ADL activity. 4.  Patient will ambulate 100 feet with S/RW without LOB/path deviatons for improved functional mobility/qait/safe discharge. Reviewed and updated 10/6/2018 and to be accomplished within 7 day(s) 1. Patient will move from supine to sit and sit to supine  in bed with modified independence. 2.  Patient will transfer from bed to chair and chair to bed with modified independence using the least restrictive device. 3.  Patient will perform sit to stand with modified independence. 4.  Patient will ambulate with modified independence for 300 feet with the least restrictive device. 5.  Patient will ascend/descend 1 stairs without handrail(s) with supervision. Outcome: Progressing Towards Goal 
physical Therapy TREATMENT Patient: Clay Arambula (13 y.o. female) Date: 10/11/2018 Diagnosis: Stroke ([address] 
anemia BLEEDING OF COLON Syncope Procedure(s) (LRB): 
RIGHT COLECTOMY,RESECTION OF MESENTERIC MASS, SMALL BOWEL RESECTION (N/A) 3 Days Post-Op Precautions: Fall, Aspiration, Skin Chart, physical therapy assessment, plan of care and goals were reviewed. ASSESSMENT: 
Pt is resistant to activity increase, but able to increase mobility to short hallway ambulation. Pt's posture need repeated cuing to elongate trunk and reduce forward flexion. Pt perform exercises below, with minor UE propping. Placement needed at this time, but will continue to work toward goal and activity tolerance.  Moderate fatigue observed at end of session. Progression toward goals: 
[]      Improving appropriately and progressing toward goals [x]      Improving slowly and progressing toward goals 
[]      Not making progress toward goals and plan of care will be adjusted PLAN: 
Patient continues to benefit from skilled intervention to address the above impairments. Continue treatment per established plan of care. Discharge Recommendations:  Jarrett Roberts Further Equipment Recommendations for Discharge:  rolling walker SUBJECTIVE:  
Patient stated I dont feel janine walking this morning.  OBJECTIVE DATA SUMMARY:  
Critical Behavior: 
Neurologic State: Alert Orientation Level: Disoriented to time, Disoriented to situation, Oriented to person, Oriented to place Cognition: Follows commands, Memory loss, Recognition of people/places, Decreased attention/concentration Safety/Judgement: Decreased awareness of need for assistance, Decreased awareness of need for safety, Decreased insight into deficits Functional Mobility Training: 
Bed Mobility: 
Rolling: Minimum assistance; Moderate assistance Supine to Sit: Minimum assistance; Moderate assistance Sit to Supine: Moderate assistance Transfers: 
Sit to Stand: Moderate assistance;Minimum assistance Stand to Sit: Moderate assistance;Minimum assistance Balance: 
Sitting: Intact; With support Standing: Impaired; With support Standing - Static: Fair Standing - Dynamic : Poor Ambulation/Gait Training: 
Distance (ft): 60 Feet (ft) Assistive Device: Gait belt;Walker, rolling Ambulation - Level of Assistance: Contact guard assistance Gait Abnormalities: Decreased step clearance; Step to gait (flexed trunk) Speed/Peggy: Slow Step Length: Right shortened;Left shortened Therapeutic Exercises:  
 
 
EXERCISE Sets Reps Active Active Assist  
Passive Self ROM Comments Ankle Pumps 1 30  [x] [] [] [] Quad Sets/Glut Sets   [] [] [] [] Hamstring curls 2 10 [] [] [] [] Mod resistance Short Arc Quads   [] [] [] [] Heel Slides   [] [] [] [] Straight Leg Raises   [] [] [] [] Hip Abd/Add 2 10 [x] [] [] [] Long Arc Quads 2 10 [x] [] [] [] Seated Marching 2 10 [x] [] [] []   
Standing Marching   [] [] [] []   
   [] [] [] []   
 
 
Pain: 
Pain Scale 1: Numeric (0 - 10) Pain Intensity 1: 0 Activity Tolerance:  
Good Please refer to the flowsheet for vital signs taken during this treatment. After treatment:  
[] Patient left in no apparent distress sitting up in chair 
[x] Patient left in no apparent distress in bed 
[x] Call bell left within reach [x] Nursing notified 
[] Caregiver present 
[] Bed alarm activated Marivel Peralta PTA Time Calculation: 45 mins

## 2018-10-11 NOTE — PROGRESS NOTES
[facility] Pul[facility], Critical Care, and Sleep Medicine Name: Evelyn Mathur MRN: [id number] : 1932 Hospital: [facility] [place] Date: 10/11/2018 PCCM Note Subjective/History:  
 
Evelyn Mathur is a 80 y.o. female with PMHx significant for HTN, DM, AFib, obesity, former smoker, COPD, restrictive ventilatory defect and reduced DLCO on PFT  due to chronic atelectasis of RT middle and LT lingula lobes; chronic hypoxic respiratory failure on home O2 at 2L, chronic mediastinal and hilar lymphadenopathy-suspected sarcoidosis, but no tissue diagnosis due to old age. She was admitted with syncope/anemia and found to have colon mass on w/u likely colon cancer. 10/11/18: S/p surgery 10/8 evening - right colectomy. Patient was left intubated due to respiratory risks; extubated 10/9 am. 
Patient moved out of icu yesterday. Breathing is good; no cough or SOB No abd pains; + flatus No Known Allergies Past Medical History:  
Diagnosis Date  Acid reflux  Arthritis  Atrial fibrillation ([address]  Autoimmune disease ([facility] Sarcoidosis  Diabetes ([facility]  High cholesterol  Hypertension  Sarcoidosis of lung ([facility]  Stroke ([facility] Past Surgical History:  
Procedure Laterality Date  COLONOSCOPY N/A 10/3/2018 COLONOSCOPY, POLYPECTOMY, BIOPSY, ENDOSCOPIC TATTOO performed by Vineet Mcgraw MD at [place] [address] Social History Substance Use Topics  Smoking status: Former Smoker Packs/day: 2.50 Years: 30.00 Types: Cigarettes  Smokeless tobacco: Never Used  Alcohol use No  
  
 
Current Facility-Administered Medications Medication Dose Route Frequency  metoprolol tartrate (LOPRESSOR) tablet 12.5 mg  12.5 mg Oral Q12H  
 albuterol-ipratropium (DUO-NEB) 2.5 MG-0.5 MG/3 ML  3 mL Nebulization Q4H PRN  
  fluticasone-vilanterol (BREO ELLIPTA) 100mcg-25mcg/puff  1 Puff Inhalation DAILY  oxyCODONE-acetaminophen (PERCOCET) 5-325 mg per tablet 1 Tab  1 Tab Oral Q6H PRN  
 famotidine (PEPCID) tablet 20 mg  20 mg Oral DAILY  insulin lispro (HUMALOG) injection   SubCUTAneous AC&HS  naloxone (NARCAN) injection 0.2 mg  0.2 mg IntraVENous PRN  
 flumazenil (ROMAZICON) 0.1 mg/mL injection 0.2 mg  0.2 mg IntraVENous Multiple  diphenhydrAMINE (BENADRYL) injection 12.5 mg  12.5 mg IntraVENous Multiple  glucose chewable tablet 16 g  4 Tab Oral PRN  
 glucagon (GLUCAGEN) injection 1 mg  1 mg IntraMUSCular PRN  
 dextrose (D50W) injection syrg 12.5-25 g  25-50 mL IntraVENous PRN  
 0.9% sodium chloride infusion 250 mL  250 mL IntraVENous PRN  
 lactated Ringers infusion  50 mL/hr IntraVENous CONTINUOUS  
 simvastatin (ZOCOR) tablet 20 mg  20 mg Oral QHS  fluticasone (FLONASE) 50 mcg/actuation nasal spray 2 Spray  2 Spray Both Nostrils DAILY  metoclopramide HCl (REGLAN) injection 5 mg  5 mg IntraVENous Q6H PRN  polyethylene glycol (MIRALAX) packet 17 g  17 g Oral BID  albuterol (PROVENTIL HFA, VENTOLIN HFA, PROAIR HFA) inhaler 1-2 Puff  1-2 Puff Inhalation Q6H PRN  
 sodium chloride (NS) flush 5-10 mL  5-10 mL IntraVENous Q8H  
 sodium chloride (NS) flush 5-10 mL  5-10 mL IntraVENous PRN  
 aspirin (ASPIRIN) tablet 325 mg  325 mg Oral DAILY  atorvastatin (LIPITOR) tablet 80 mg  80 mg Oral DAILY  0.9% sodium chloride infusion 250 mL  250 mL IntraVENous PRN Review of Systems: 
Ears, nose, mouth, throat, and face: no difficulty in swallowing Respiratory: as above Cardiovascular: no chest pain or palpitations Gastrointestinal: no abd pain or vomitting or diarrhea, no bleeding symptoms Genitourinary: No urinary symptoms Constitutional: No fever or chills Objective:  
Vital Signs:   
Visit Vitals  /55 (BP 1 Location: Right arm, BP Patient Position: At rest)  Pulse 95  Temp 98.3 °F (36.8 °C)  Resp 20  
 Ht 5' 3\" (1.6 m)  Wt 92.9 kg (204 lb 12.8 oz)  SpO2 97%  BMI 36.28 kg/m2 O2 Device: Nasal cannula O2 Flow Rate (L/min): 2 l/min Temp (24hrs), Av.1 °F (36.7 °C), Min:97.3 °F (36.3 °C), Max:98.7 °F (37.1 °C) Intake/Output:  
Last shift:        
Last 3 shifts: 10/09 1901 - 10/11 0700 In: 855.1 [I.V.:855.1] Out: 1025 [Urine:1025] No intake or output data in the 24 hours ending 10/11/18 1140 Physical Exam:  
Comfortable; now on 2 L nc only; acyanotic HEENT: no scleral jaundice Neck: No adenopathy or thyroid swelling CVS: S1S2 no murmurs; JVD not elevated RS: Mod air entry bilaterally, decreased BS at bases, no wheezes or crackles Abd: soft, non tender, no hepatosplenomegaly, no abd distension Laparotomy dressing Neuro: awake, alert, non focal 
Extrm: no leg edema or swelling or clubbing Data:  
   
Recent Results (from the past 24 hour(s)) GLUCOSE, POC Collection Time: 10/10/18  5:03 PM  
Result Value Ref Range Glucose (POC) 145 (H) 70 - 110 mg/dL GLUCOSE, POC Collection Time: 10/10/18  9:44 PM  
Result Value Ref Range Glucose (POC) 176 (H) 70 - 110 mg/dL METABOLIC PANEL, COMPREHENSIVE Collection Time: 10/11/18 12:30 AM  
Result Value Ref Range Sodium 140 136 - 145 mmol/L Potassium 4.3 3.5 - 5.5 mmol/L Chloride 106 100 - 108 mmol/L  
 CO2 31 21 - 32 mmol/L Anion gap 3 3.0 - 18 mmol/L Glucose 132 (H) 74 - 99 mg/dL BUN 11 7.0 - 18 MG/DL Creatinine 1.14 0.6 - 1.3 MG/DL  
 BUN/Creatinine ratio 10 (L) 12 - 20 GFR est AA 55 (L) >60 ml/min/1.73m2 GFR est non-AA 45 (L) >60 ml/min/1.73m2 Calcium 8.3 (L) 8.5 - 10.1 MG/DL Bilirubin, total 0.9 0.2 - 1.0 MG/DL  
 ALT (SGPT) 16 13 - 56 U/L  
 AST (SGOT) 11 (L) 15 - 37 U/L Alk. phosphatase 53 45 - 117 U/L Protein, total 5.5 (L) 6.4 - 8.2 g/dL Albumin 2.2 (L) 3.4 - 5.0 g/dL Globulin 3.3 2.0 - 4.0 g/dL A-G Ratio 0.7 (L) 0.8 - 1.7    
CBC W/O DIFF Collection Time: 10/11/18 12:30 AM  
Result Value Ref Range WBC 9.1 4.6 - 13.2 K/uL  
 RBC 3.67 (L) 4.20 - 5.30 M/uL HGB 7.9 (L) 12.0 - 16.0 g/dL HCT 27.1 (L) 35.0 - 45.0 % MCV 73.8 (L) 74.0 - 97.0 FL  
 MCH 21.5 (L) 24.0 - 34.0 PG  
 MCHC 29.2 (L) 31.0 - 37.0 g/dL RDW 27.3 (H) 11.6 - 14.5 % PLATELET 227 618 - 040 K/uL MPV 9.3 9.2 - 11.8 FL  
GLUCOSE, POC Collection Time: 10/11/18  6:01 AM  
Result Value Ref Range Glucose (POC) 132 (H) 70 - 110 mg/dL GLUCOSE, POC Collection Time: 10/11/18 11:32 AM  
Result Value Ref Range Glucose (POC) 146 (H) 70 - 110 mg/dL PFT  PFTs 4/14/15 FLOWS: 
FEV1/FVC ratio is preserved        
Pre bronchodilator FEV1 is 0.77 L (51% predicted) Pre bronchodilator FVC is 0.90 L (46% predicted)    
Volumes: 
TLC, RV and VC are decreased TLC is 55% predicted    
Bronchodilator: No bronchodilator improvement seen Diffusion: 
Diffusion Capacity - decreased at 11% predicted Impression: 
Predominantly moderate restrictive defect seen. An additional airflow obstructive defect cannot be ruled out. There was no bronchodilator response.   
Severe decrease in uncorrected diffusion capacity    
 
 
ECHO 9/30/18 Estimated left ventricular ejection fraction is 66 - 70%. Left ventricular mild concentric hypertrophy. Normal left ventricular wall motion, no regional wall motion abnormality noted. Mild (grade 1) left ventricular diastolic dysfunction E/E' ratio is 12. Clerance Riis Mitral valve non-specific thickening. Mild mitral valve regurgitation. Mild tricuspid valve regurgitation is present. Pulmonary arterial systolic pressure is 35 mmHg. Mild pulmonary hypertension is present. Mild pulmonic valve regurgitation is present. Saline contrast was given to evaluate for intracardiac shunt. Right to left shunt seen at rest.  
 
 
CT chest 10/4/18 Results from Hospital Encounter encounter on 09/30/18 CT CHEST WO CONT Narrative EXAM: CT chest  
 
INDICATION: Shortness of breath. Sarcoidosis. COMPARISON: CTA thorax 2/21/2015 TECHNIQUE: Axial CT imaging from the thoracic inlet through the diaphragm with 
intravenous contrast. Multiplanar reformats were generated. One or more dose 
reduction techniques were used on this CT: automated exposure control, 
adjustment of the mAs and/or kVp according to patient size, and iterative 
reconstruction techniques. The specific techniques used on this CT exam have 
been documented in the patient's electronic medical record. 
 
_______________ FINDINGS: 
 
LUNGS: Bandlike fibrosis bilateral lower lobes and lingula. Paraseptal emphysema 
right apex. PLEURA: Normal. 
 
AIRWAY: Normal. 
 
MEDIASTINUM: Mild cardiomegaly. No pericardial effusion. Dilated main pulmonary 
artery relative to adjacent ascending aorta compatible with pulmonary 
hypertension. LYMPH NODES: ATS 4R adenopathy measuring about 1.4 cm short axis with prior 
measurement 2.5 cm. ATS 3B adenopathy 1 cm with prior measurement 2 cm. ATS 7 
subcarinal adenopathy 1.1 cm with prior measurement 2.5 cm. These findings 
represent a very significant improvement since prior CT. 
 
OTHER: No acute or aggressive osseous abnormalities identified. _______________ Impression IMPRESSION: 
 
 
1. Bandlike fibrosis bilateral lower lobes and lingula without change. No acute 
pulmonary process. 2. Mild cardiomegaly. Pulmonary hypertension again evident. 3. Significant improvement in mediastinal adenopathy since prior study. No 
definitive hilar adenopathy remaining. CXR 10/10 Results from Hospital Encounter encounter on 09/30/18 XR CHEST PORT Narrative Chest, single view Indication: Shortness of breath Comparison: 9 October 2018 Findings:  Portable upright AP view of the chest was obtained. Interval removal 
of endotracheal and nasogastric tubes. The lung volumes are low.  No pneumothorax 
or focal pneumonic opacity. Possible small posteriorly layering pleural 
effusions. Cardiac size and mediastinal contours are stable. Mild gaseous 
distention of the stomach noted. Impression Impression: Interval endotracheal extubation and nasogastric tube removal with low lung 
volumes, likely bibasilar atelectasis, and small posteriorly layering pleural 
effusions. MRI brain 9/30/18: 
1. Possible tiny area of acute right parietal infarction, evident on diffusion 
only without hemorrhage or mass effect. 2. Moderately prominent ischemic white matter change. Ischemic changes in the 
thalami. 3. Chronic left frontal parietal and small right frontal cortical/subcortical 
infarctions. Numerous chronic bilateral cerebellar hemisphere infarctions. IMPRESSION:  
· Vent dependent respiratory failure 10/8/18 - post laparotomy · Adenocarcinoma of colon (syncope and anemia leading to further evaluation and diagnosis of colon mass) - s/p laparatomy and RT colectomy 10/8/18 · Syncope, due to severe anemia. Nuclear stress test negative. · S/p very mild hemoptysis, very mild blood tinged sputum couple times since last couple days. Resolved. CT chest unremarkable for any causes for hemoptysis. · Hx of suspected Sarcoidosis (mediastinal/hilar lymphadenopathy) and chronic bilateral lobar atelectasis · COPD with paraseptal emphysema on CT chest - prior FEV1 of 51% ·  Chronic hypoxic respiratory failure on home O2 2-3 L/m · Former 2 PPD x20 yrs - smoker, quit 40 yrs ago · Tracheobronchomalacia  
· HTN 
· DM 2 
· AFib · Anemia · Obesity · Echo normal LV fn and grade 1 DD  
  
RECOMMENDATIONS:  
RS - stable respirations; nc o2 at 2 L; incent spirometry-CXR shows low lung volumes; OOB as tolerated; PT and OT on case CVS-stable hemodynamically; low dose metoprolol bid - BP improved Bronchodilators- Breo 100 mcg 1 puff daily; prn duonebs On home 02 at 2-3 lit/min at baseline; target O2 sats 90-95% GI - oral clear diet per Surgery team 
DVT Prophylaxis: SCDs GI Prophylaxis: oral pepcid Code status: Full Code Other issues management by primary team and respective consultants. Further recommendations will be based on the patient's response to recommended treatment and results of the investigation ordered. Discussed with family - at bedside. Bonnie Ford MD 
10/11/2018

## 2018-10-11 NOTE — PROGRESS NOTES
2000 -  Bedside and Verbal shift change report given to Harvey Milan RN (oncoming nurse) by Rosanna Mendenhall RN (offgoing nurse). Report included the following information SBAR, Kardex, OR Summary, Procedure Summary, Intake/Output and Cardiac Rhythm ST/SR.  
2015 - Pt assessed. Daughter at the bedside. Pt is confused but pleasant. Denies all pain, shortness of breath, chest pain, nausea, headache, dizziness, numbness, tingling. AxOx2 to hospital and name, incorrectly identifies place and time. Midline abdominal incision is covered with 4x4, ABD pad, and tape, drain is beneath dressing. Pt requests more food than just clear liquids. Pt tolerating clear liquids well. Pt reports having used bedpan recently, daughter confirms. Pt states she wears briefs at home at night, daughter placed pt in brief. Will continue to monitor, see EMR for full details. 0045 - Pt reassessed. Minimal changes confusion persists. Daughter remains at the bedside. Pt resting very comfortably. Will continue to monitor, see EMR for full details. 0400 - Pt assessed and vitals obtained. No acute changes and minimal complaints. Will continue to monitor, see EMR for full details. 0319 - Bedside and Verbal shift change report given to Rosanna Mendenhall RN (oncoming nurse) by Harvey Milan RN (offgoing nurse).  Report included the following informatiSRSBAR, Kardex, Procedure Summary, Intake/Output, MAR, Med Rec Status and Cardiac Rhythm SR

## 2018-10-11 NOTE — WOUND CARE
Patient seen during hospital wide pressure injury prevalence. Skin intact, patient repositions self in bed with assistance. Spoke with patient concerning pressure relieving strategies. Wound care will continue to monitor during admission.

## 2018-10-12 NOTE — PROGRESS NOTES
NUTRITION FOLLOW-UP 
 
RECOMMENDATIONS/PLAN:  
-Continue to encourage PO intake >50% at most meals Monitor labs/lytes, PO intakes, skin integrity, wt, fluid status, BM 
NUTRITION ASSESSMENT:  
Client Update: 80 yrs old Female with Colon cancer s/p surgery-adenocarcinoma, carcinoid tumor, ac resp failure extubated 10/9/18, ac blood loss anemia, weakness, chronic resp failure. FOOD/NUTRITION INTAKE Diet Order:  Full liquid Food Allergies: NKFA/ Average PO Intake:     
Patient Vitals for the past 100 hrs: 
 % Diet Eaten 10/12/18 0930 10 % 10/09/18 1400 10 % 10/08/18 0630 0 % Pertinent Medications:  [x] Reviewed; pepcid, reglan, lopressor, miralax, Electrolyte Replacement Protocol: []K []Mg []PO4 Insulin:  []SSI  [x]Pre-meal   []Basal    []Drip  []None Cultural/Ep[employer] Food Preferences: None Identified BIOCHEMICAL DATA & MEDICAL TESTS Pertinent Labs:  [x] Reviewed; ANTHROPOMETRICS Height: 5' 3\" (160 cm)       Weight: 95.8 kg (211 lb 3.2 oz) BMI: 37.4 kg/m^2 obese (30%-39.9% BMI) Adm Weight: 202 lbs                Weight change: +9lbs Adjusted Body Weight: 62.2kg NUTRITION-FOCUSED PHYSICAL ASSESSMENT Skin: No PU     
GI: no new BM since last review NUTRITION PRESCRIPTION Calories: 9753-2637 kcal/day based on 25-30 kcal/kg ABW Protein: 81 g/day based on 1.3 g/kg ABW CZJXM: [id number] /JENNY based on 1 kcal/ml      
 
NUTRITION DIAGNOSES:  
1. . Inadequate oral intake related to colon/rectal surgery as evidenced by 10% PO intake via 24 hour assessment.   
  
 
NUTRITION INTERVENTIONS:  
INTERVENTIONS:        GOALS: 
1. Other:continue w/ POC 1. Encourage PO intake >50% at most meals by next review 3 days LEARNING NEEDS (Diet, Supplementation, Food/Nutrient-Drug Interaction): 
 [x] None Identified   [] Education provided/documented      Identified and patient: [] Declined   [] Was not appropriate/indicated NUTRITION MONITORING /EVALUATION:  
 Follow PO intake Monitor wt Monitor renal labs, electrolytes, fluid status Monitor for additional supplement needs Previous Recommendations Implemented: yes Previous Goals Met:  no -intakes remain below goal  
   
[] Participated in Interdisciplinary Rounds   
[x] [address] [place] [address] Reviewed DISCHARGE NUTRITION RECOMMENDATIONS ADDRESSED:  
   [x] To be determined closer to discharge NUTRITION RISK:           [x] At risk                        [] Not currently at risk Will follow-up per policy. Luis Angel Mcarthur 9

## 2018-10-12 NOTE — DISCHARGE SUMMARY
[facility] DISCHARGE SUMMARY Ailyn Angel 
MR#: [id number] : 1932 ACCOUNT #: [de-identified] ADMIT DATE: 2018 DISCHARGE DATE: 10/12/2018 DISCHARGE DIAGNOSES: 
1. Colon cancer, status post resection. 2.  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 3.  Syncope on admission. 4.  Chronic respiratory failure on home oxygen. 5.  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 6.  Morbid obesity. 7.  Noninsulin dependent diabetes mellitus. 8.  History of atrial fibrillation. 9.  Hypercholesterolemia. 10.  Hypertension. 11.  History of stroke. HOSPITAL COURSE:  The patient is 80years old. She lives at home with her family. She has had an extensive hospital stay here where she initially presented with syncopal event and possible stroke. She was worked up in regards to that, was seen by neurology. She had a brain MRI that showed a possible area of acute right parietal infarction. Neurology recommended daily aspirin therapy, statin therapy. She was given IV fluids and treated as if she had had a stroke. She had further evaluation because she was significantly anemic and GI was brought on the case. She had a blood transfusion. There was no clear evidence of why she had anemia, but one of her daughters noted that she had some blood in her stool at home, so GI recommended further evaluation to include a colonoscopy, at which point they found a colon mass suspicious for carcinoma. With that, surgery became involved. The risks and benefits of proceeding with a partial colectomy were discussed and the family agreed that they wanted the patient to have the procedure. So she did. She tolerated that well.   Prior to the surgery she was seen by cardiology and had a Lexiscan stress test which was negative for signs of ischemia, and postoperatively she has been recovering slowly but surely over the last few days. Her H and H is stable at 7.7 and 26.9. There is no evidence for bleeding. This surgical site is intact. She had a bowel movement last night. She is on a full liquid diet now and has a poor appetite, but is tolerating full liquids okay. Electrolytes, BUN and creatinine are stable and she has been participating with physical therapy. The family wants her to go to an acute rehab so that has been set up for her. Surgery has released her to be able to discharge to rehab and at this point, there is no acute medical need for her to stay in the hospital.  I have reviewed that plan with her family today. The patient is an 80year-old debilitated, obese black female who is sitting in the chair comfortably today. Her incision site is clear. The staples are intact. The Penrose is in place, but will be removed by the nurse prior to discharge. Lungs are clear bilaterally. Cardiac exam:  Regular rate and rhythm. She is on her home dose of 2 liters nasal cannula and she is saturating 100%. Her respiratory rate is 16, blood pressure 107/61, pulse 95, temperature 98.2. She is currently on a medication regimen that will continue with the facility to include the following:  Aspirin 325 mg daily, Lipitor 80 mg at night, Albuterol inhaler 2 puffs every 4 hours as needed, albuterol nebs 4 times daily as needed, DuoNeb every 4 hours as needed. She has Lipitor 80 mg at night, Breo Ellipta 1 puff daily, Lasix 20 mg daily, glipizide 5 mg twice daily, metformin 500 mg daily, Lopressor 12.5 mg twice daily, Singulair 10 mg daily, MiraLax 17 grams daily, Protonix 40 mg daily, Zantac 150 mg twice daily. We are going to stop the Zocor since she is going to be on Lipitor. So with that said there was a possible stroke when she came in, but significant anemia, which led to an investigation in her GI tract.   She was discovered to have a colon cancer. That has been removed. There was evidence for neuro carcinoma. She will need oncology followup after she sees surgery as an outpatient. She is clinically stable at this point to leave the hospital and move onto acute rehabilitation. SHE IS A FULL CODE STATUS at this point in time. She is on chronic oxygen therapy, which she has her tank with her here. Her diet is full liquid and advance as tolerated. She should see surgery in 1 week to have the staples removed and to have referral to oncology if she is tolerating her strengthening and nutrition okay, and again she had a cardiac workup here to include an echocardiogram and a stress test.  She does have a normal ejection fraction, but grade I diastolic dysfunction. Her Lexiscan stress test was negative for signs of ischemia. 35 minutes on discharge time today. DISPOSITION:  The patient is going to [address] [place] [address]. Of note, she should have Accu-Cheks before breakfast and at bedtime for her diabetes as well, but her blood sugars here have been very stable. MD TERESSA Coyle/OLESYA 
D: 10/12/2018 12:24    
T: 10/12/2018 12:42 JOB #: [id number] CC: Heriberto Olivia MD

## 2018-10-12 NOTE — PROGRESS NOTES
Problem: Pressure Injury - Risk of 
Goal: *Prevention of pressure injury Document Tavares Scale and appropriate interventions in the flowsheet. Outcome: Progressing Towards Goal 
Pressure Injury Interventions: 
Sensory Interventions: Pressure redistribution bed/mattress (bed type) Moisture Interventions: Absorbent underpads, Apply protective barrier, creams and emollients, Check for incontinence Q2 hours and as needed Activity Interventions: Pressure redistribution bed/mattress(bed type), PT/OT evaluation Mobility Interventions: PT/OT evaluation, Pressure redistribution bed/mattress (bed type) Nutrition Interventions: Document food/fluid/supplement intake, Offer support with meals,snacks and hydration Friction and Shear Interventions: HOB 30 degrees or less, Transferring/repositioning devices Problem: Falls - Risk of 
Goal: *Absence of Falls Document April Lissa Fall Risk and appropriate interventions in the flowsheet. Outcome: Progressing Towards Goal 
Fall Risk Interventions: 
Mobility Interventions: Patient to call before getting OOB, PT Consult for mobility concerns, PT Consult for assist device competence Mentation Interventions: Room close to nurse's station, Family/sitter at bedside, Adequate sleep, hydration, pain control, Evaluate medications/consider consulting pharmacy Medication Interventions: Patient to call before getting OOB, Teach patient to arise slowly, Utilize gait belt for transfers/ambulation Elimination Interventions: Toilet paper/wipes in reach, Patient to call for help with toileting needs, Call light in reach, Toileting schedule/hourly rounds History of Falls Interventions: Bed/chair exit alarm, Consult care management for discharge planning

## 2018-10-12 NOTE — PROGRESS NOTES
Problem: Pressure Injury - Risk of 
Goal: *Prevention of pressure injury Document Tavares Scale and appropriate interventions in the flowsheet. Outcome: Resolved/Met Date Met: 10/12/18 Pressure Injury Interventions: 
Sensory Interventions: Assess changes in LOC Moisture Interventions: Absorbent underpads Activity Interventions: Assess need for specialty bed, Pressure redistribution bed/mattress(bed type) Mobility Interventions: Pressure redistribution bed/mattress (bed type) Nutrition Interventions: Document food/fluid/supplement intake, Offer support with meals,snacks and hydration Friction and Shear Interventions: HOB 30 degrees or less, Transferring/repositioning devices Problem: Falls - Risk of 
Goal: *Absence of Falls Document Kimani Hollow Fall Risk and appropriate interventions in the flowsheet. Outcome: Resolved/Met Date Met: 10/12/18 Fall Risk Interventions: 
Mobility Interventions: Patient to call before getting OOB Mentation Interventions: More frequent rounding, Reorient patient Medication Interventions: Patient to call before getting OOB Elimination Interventions: Toilet paper/wipes in reach, Patient to call for help with toileting needs, Call light in reach, Toileting schedule/hourly rounds History of Falls Interventions: Bed/chair exit alarm, Consult care management for discharge planning

## 2018-10-12 NOTE — PROGRESS NOTES
Verbal bedside received from Rebeca Mcnamara off going nurse. Assumed patient care, bed in low position, call light within reach, white board updated.   Patient BP 84/48. Paged Dr. Judith Mares. Order received for 500 cc Bolus and HH. Family called and updated on patients status. Family member came for overnight stay.  Confusion arose concerning patients arm band and stated  Nursing supervisor talked to family member. Family member to bring documents. Patient had uneventful night. No complains of pain was reported or observed. NAD Bedside and Verbal shift change report given to Jevon David RN (oncoming nurse) by Jovanni Singh (offgoing nurse). Report included the following information SBAR, Kardex and MAR.

## 2018-10-12 NOTE — PROGRESS NOTES
Progress Note Patient: Sukhjinder He MRN: [id number]  CSN: [id number] YOB: 1932  Age: 80 y.o. Sex: female DOA: 9/30/2018 LOS:  LOS: 12 days Subjective: No complaints Objective:  
  
Visit Vitals  /58 (BP 1 Location: Right arm, BP Patient Position: At rest;Supine; Head of bed elevated (Comment degrees))  Pulse 92  Temp 99 °F (37.2 °C)  Resp 16  
 Ht 5' 3\" (1.6 m)  Wt 95.8 kg (211 lb 3.2 oz)  SpO2 100%  BMI 37.41 kg/m2 Physical Exam: 
Awake and alert, abd with incision dry, penrose with little drainage Intake and Output: 
Current Shift:    
Last three shifts:    
 
Labs: Results:  
   
Chemistry Recent Labs 10/11/18 
 0030  10/10/18 
 0741  10/09/18 
 1322 GLU  132*  153*  283* NA  140  141   --   
K  4.3  4.4   --   
CL  106  105   --   
CO2  31  33*   --   
BUN  11  11   --   
CREA  1.14  1.26   --   
CA  8.3*  8.5   --   
AGAP  3  3   --   
BUCR  10*  9*   --   
AP  53   --    --   
TP  5.5*   --    --   
ALB  2.2*   --    --   
GLOB  3.3   --    --   
AGRAT  0.7*   --    --   
  
CBC w/Diff Recent Labs 10/12/18 
 2747  10/11/18 
 2145  10/11/18 
 0030  10/10/18 
 1319 WBC  9.2   --   9.1  9.3  
RBC  3.65*   --   3.67*  3.93* HGB  7.7*  7.9*  7.9*  8.4* HCT  26.9*  27.3*  27.1*  28.7*  
PLT  213   --   189  196 GRANS   --    --    --   77* LYMPH   --    --    --   11* EOS   --    --    --   0 Cardiac Enzymes No results for input(s): CPK, CKND1, YESENIA in the last 72 hours. No lab exists for component: [place] Coagulation No results for input(s): PTP, INR, APTT in the last 72 hours. No lab exists for component: INREXT Lipid Panel Lab Results Component Value Date/Time  Cholesterol, total 175 09/30/2018 10:00 AM  
 HDL Cholesterol 81 (H) 09/30/2018 10:00 AM  
 LDL, calculated 70 09/30/2018 10:00 AM  
 VLDL, calculated 24 09/30/2018 10:00 AM  
 Triglyceride 120 09/30/2018 10:00 AM  
 CHOL/HDL Ratio 2.2 09/30/2018 10:00 AM  
  
BNP No results for input(s): BNPP in the last 72 hours. Liver Enzymes Recent Labs 10/11/18 
 0030 TP  5.5* ALB  2.2* AP  53 SGOT  11* Thyroid Studies No results found for: T4, T3U, TSH, TSHEXT Medications Reviewed Assessment/Plan Principal Problem: 
  Syncope (9/30/2018) Active Problems: 
  Sarcoidosis (2/21/2015) Hypoxia (2/22/2015) Stroke [facility]) (9/30/2018) Chronic respiratory failure with hypoxia, on home O2 therapy ([address] (9/30/2018) HTN (hypertension) (9/30/2018) Type II diabetes mellitus, uncontrolled ([address] (9/30/2018) Severe anemia (9/30/2018) Sarcoidosis of lung ([address] (10/4/2018) Colonic mass (10/4/2018) Cont present care

## 2018-10-12 NOTE — PROGRESS NOTES
1120 Dr Nikolai Dorsey called, ordered to removed penrose drain. Abdominal dressing changed, pain medicine given after dressing changed. 1332 report given to nurse Irma Do in [address] [place] [address].

## 2018-10-15 PROBLEM — C18.9 COLON CANCER (HCC): Status: ACTIVE | Noted: 2018-01-01

## 2018-10-15 NOTE — PROGRESS NOTES
Patient's daughters wanted their mother to have an AMD. When  got to the  
ER the patient was unable to verbalize. In that case we could not do it.  told the  
daughter that when her mom gets her speech back we  are here to help her fill out the Form. Sister Amalia Valle, [place], [address]  [facility] [phone]

## 2018-10-15 NOTE — PROGRESS NOTES
Patient escorted down to CT and to ICU. EDWINA Hernandez given report with understanding noted. Patient stable.

## 2018-10-15 NOTE — ED NOTES
In and out cath done with sterile technique; urine obtained and sent to lab. Patient needed assistance with bending right knee and keeping it up.

## 2018-10-15 NOTE — PROGRESS NOTES
Occupational Therapy Screening: 
Services are indicated. Evaluate and Treat Order is requested. [facility] therapy based on results obtained during the nursing admission assessment. The patients chart was reviewed and the patient is appropriate for a skilled therapy evaluation. Patient recently admitted w/ CVA w/ subsequent findings of colon CA s/p resection. Pt sent to Rehab/[facility] and found to have R facial droop, R sided weakness, and non-verbal. Patient should have OT/PT/ST and Stroke Coordinator orders per Stroke Protocol. Please use Stroke Protocol Order Set. Thank you.  
Milla Velez, OTR/L

## 2018-10-15 NOTE — ED PROVIDER NOTES
EMERGENCY DEPARTMENT HISTORY AND PHYSICAL EXAM 
 
Date: 10/15/2018 Patient Name: Taurus Márquez History of Presenting Illness Chief Complaint Patient presents with  Aphasia History Provided By: EMS Chief Complaint: AMS Duration: last known nl at 1:30 AM this morning Timing:  Constant Location: generalized Quality: altered Associated Symptoms: right sided weakness and facial droop Additional History (Context):  
8:49 AM 
Maia Cerrato is a 80 y.o. female with PMHX of colon cancer with resection, DM, GERD, HLD, HTN, AFIB, and sarcoidosis who presents to the emergency department from [facility] via EMS C/O AMS with last known nl at 1:30 AM. Family came to visit last night and noticed pt with last known nl at 1:30 AM. Pt was making weird noises at 3:30 AM per family members. Associated sxs include right sided facial droop with right sided weakness. Code S called in the field at 8:46 AM and Code S protocol initiated. FSBS at 105 mg/dL, per EMS. NKDA, per past EMR. PSHx of cholecystectomy and colonoscopy. Pt, per past EMR, is a former smoker, a non EtOH user, and a non recreational drug user. Hx and ROS limited due to AMS. PCP: Lorraine David MD 
 
Current Facility-Administered Medications Medication Dose Route Frequency Provider Last Rate Last Dose  sodium chloride (NS) flush 5-10 mL  5-10 mL IntraVENous PRN Hassel Odor Agustiady-Robles, DO      
 sodium chloride 0.9 % bolus infusion 1,000 mL  1,000 mL IntraVENous ONCE Hassel Odor Agustiady-Robles, DO 1,000 mL/hr at 10/15/18 1148 1,000 mL at 10/15/18 1148 Current Outpatient Prescriptions Medication Sig Dispense Refill  escitalopram oxalate (LEXAPRO) 10 mg tablet Take 10 mg by mouth daily.  lisinopril-hydroCHLOROthiazide (ZESTORETIC) 20-12.5 mg per tablet Take 1 Tab by mouth daily.  clopidogrel (PLAVIX) 75 mg tab Take 75 mg by mouth daily.  aspirin (ASPIRIN) 325 mg tablet Take 1 Tab by mouth daily. 2 Tab 0  
 atorvastatin (LIPITOR) 80 mg tablet Take 1 Tab by mouth daily. 2 Tab 0  
 fluticasone-vilanterol (BREO ELLIPTA) 100-25 mcg/dose inhaler Take 1 Puff by inhalation daily. 1 Inhaler 1  
 metoprolol tartrate (LOPRESSOR) 25 mg tablet Take 0.5 Tabs by mouth every twelve (12) hours. 2 Tab 0  
 polyethylene glycol (MIRALAX) 17 gram packet Take 1 Packet by mouth daily. 1 Packet 0  
 montelukast (SINGULAIR) 10 mg tablet Take 10 mg by mouth nightly.  ranitidine (ZANTAC) 150 mg tablet Take 150 mg by mouth two (2) times a day.  furosemide (LASIX) 20 mg tablet Take  by mouth daily.  glipiZIDE (GLUCOTROL) 5 mg tablet Take 5 mg by mouth two (2) times a day.  metFORMIN (GLUCOPHAGE) 500 mg tablet Take 500 mg by mouth two (2) times daily (with meals).  pantoprazole (PROTONIX) 40 mg tablet Take 40 mg by mouth daily.  mometasone (NASONEX) 50 mcg/actuation nasal spray 2 Sprays daily.  albuterol (PROVENTIL HFA, VENTOLIN HFA, PROAIR HFA) 90 mcg/actuation inhaler Take 1-2 Puffs by inhalation every six (6) hours as needed for Wheezing. 1 Inhaler 3 Past History Past Medical History: 
Past Medical History:  
Diagnosis Date  Acid reflux  Arthritis  Atrial fibrillation ([facility]  Autoimmune disease ([facility] Sarcoidosis  Diabetes ([facility]  High cholesterol  Hypertension  Sarcoidosis of lung ([facility]  Stroke ([facility] Past Surgical History: 
Past Surgical History:  
Procedure Laterality Date  COLONOSCOPY N/A 10/3/2018 COLONOSCOPY, POLYPECTOMY, BIOPSY, ENDOSCOPIC TATTOO performed by Laine Brown MD at [place] [id number] Family History: 
History reviewed. No pertinent family history. Social History: 
Social History Substance Use Topics  Smoking status: Former Smoker Packs/day: 2.50 Years: 30.00 Types: Cigarettes  Smokeless tobacco: Never Used  Alcohol use No  
 
 
Allergies: 
No Known Allergies Review of Systems Review of Systems Unable to perform ROS: Mental status change Neurological: Positive for facial asymmetry (right side droop) and weakness (right sided weakness). Physical Exam  
 
Vitals:  
 10/15/18 0930 10/15/18 0945 10/15/18 1000 10/15/18 1200 BP: 108/63 114/61 99/71 118/83 Pulse: 91 94  94 Resp: 21 22  25 Temp:      
SpO2: 100% 99% 100% 99% Height:      
 
Physical Exam  
Nursing note and vitals reviewed. Constitutional: Elderly  Female. Sleepy but easily arousable and following commands. Head: Normocephalic, Atraumatic Eyes: Pupils are equal, round, and reactive to light, EOMI, no scleral icterus, no conjunctival pallor ENT: moist mucous membranes, hearing intact Neck: Supple, non-tender Cardiovascular: Regular rate and rhythm, no murmurs, rubs, or gallops Chest: Normal work of breathing and chest excursion bilaterally Lungs: Clear to ausculation bilaterally, no wheezes, no rhonchi Abdomen: Soft, non tender, non distended, normoactive bowel sounds Back: No evidence of trauma or deformity Extremities: No evidence of trauma or deformity, no LE edema Skin: Warm and dry, normal cap refill Neuro: Alert, but aphasic, right sided facial droop, Drift to right upper and right lower extremity. Psychiatric: Cooperative, appropriate mood Diagnostic Study Results Labs - Recent Results (from the past 12 hour(s)) METABOLIC PANEL, COMPREHENSIVE Collection Time: 10/15/18  9:00 AM  
Result Value Ref Range Sodium 141 136 - 145 mmol/L Potassium 4.7 3.5 - 5.5 mmol/L Chloride 104 100 - 108 mmol/L  
 CO2 29 21 - 32 mmol/L Anion gap 8 3.0 - 18 mmol/L Glucose 119 (H) 74 - 99 mg/dL BUN 22 (H) 7.0 - 18 MG/DL Creatinine 1.51 (H) 0.6 - 1.3 MG/DL  
 BUN/Creatinine ratio 15 12 - 20 GFR est AA 40 (L) >60 ml/min/1.73m2 GFR est non-AA 33 (L) >60 ml/min/1.73m2 Calcium 8.3 (L) 8.5 - 10.1 MG/DL Bilirubin, total 0.5 0.2 - 1.0 MG/DL  
 ALT (SGPT) 21 13 - 56 U/L  
 AST (SGOT) 26 15 - 37 U/L Alk. phosphatase 61 45 - 117 U/L Protein, total 5.7 (L) 6.4 - 8.2 g/dL Albumin 2.5 (L) 3.4 - 5.0 g/dL Globulin 3.2 2.0 - 4.0 g/dL A-G Ratio 0.8 0.8 - 1.7    
CBC WITH AUTOMATED DIFF Collection Time: 10/15/18  9:00 AM  
Result Value Ref Range WBC 9.0 4.6 - 13.2 K/uL  
 RBC 4.08 (L) 4.20 - 5.30 M/uL HGB 8.7 (L) 12.0 - 16.0 g/dL HCT 29.5 (L) 35.0 - 45.0 % MCV 72.3 (L) 74.0 - 97.0 FL  
 MCH 21.3 (L) 24.0 - 34.0 PG  
 MCHC 29.5 (L) 31.0 - 37.0 g/dL RDW 27.9 (H) 11.6 - 14.5 % PLATELET 233 955 - 417 K/uL MPV 9.4 9.2 - 11.8 FL  
 NEUTROPHILS 75 (H) 40 - 73 % LYMPHOCYTES 15 (L) 21 - 52 % MONOCYTES 9 3 - 10 % EOSINOPHILS 1 0 - 5 % BASOPHILS 0 0 - 2 %  
 ABS. NEUTROPHILS 6.7 1.8 - 8.0 K/UL  
 ABS. LYMPHOCYTES 1.4 0.9 - 3.6 K/UL  
 ABS. MONOCYTES 0.8 0.05 - 1.2 K/UL  
 ABS. EOSINOPHILS 0.1 0.0 - 0.4 K/UL  
 ABS. BASOPHILS 0.0 0.0 - 0.1 K/UL  
 RBC COMMENTS ANISOCYTOSIS 3+ 
    
 RBC COMMENTS MICROCYTOSIS 1+ 
    
 RBC COMMENTS MACROCYTOSIS 1+ 
    
 RBC COMMENTS HYPOCHROMIA 1+ 
    
 RBC COMMENTS POLYCHROMASIA SLIGHT POIKILOCYTOSIS 1+ 
    
 RBC COMMENTS OVALOCYTES 1+ RBC COMMENTS     
  TARGET CELLS 
OCCASIONAL 
SCHISTOCYTES 
OCCASIONAL 
  
 DF AUTOMATED    
EKG, 12 LEAD, INITIAL Collection Time: 10/15/18  9:45 AM  
Result Value Ref Range Ventricular Rate 95 BPM  
 Atrial Rate 95 BPM  
 P-R Interval 148 ms QRS Duration 122 ms  
 Q-T Interval 386 ms QTC Calculation (Bezet) 485 ms Calculated P Axis 55 degrees Calculated R Axis 94 degrees Calculated T Axis 4 degrees Diagnosis Normal sinus rhythm Rightward axis RSR' or QR pattern in V1 suggests right ventricular conduction delay Borderline ECG When compared with ECG of 08-OCT-2018 13:11, 
 RSR' pattern in V1 has replaced Right bundle branch block LACTIC ACID Collection Time: 10/15/18 11:05 AM  
Result Value Ref Range Lactic acid 1.2 0.4 - 2.0 MMOL/L  
CULTURE, BLOOD Collection Time: 10/15/18 11:47 AM  
Result Value Ref Range Special Requests:  LEFT AC   
 Culture result: PENDING   
URINALYSIS W/ RFLX MICROSCOPIC Collection Time: 10/15/18 11:55 AM  
Result Value Ref Range Color YELLOW Appearance CLEAR Specific gravity 1.013 1.005 - 1.030    
 pH (UA) 5.0 5.0 - 8.0 Protein NEGATIVE  NEG mg/dL Glucose NEGATIVE  NEG mg/dL Ketone NEGATIVE  NEG mg/dL Bilirubin NEGATIVE  NEG Blood NEGATIVE  NEG Urobilinogen 0.2 0.2 - 1.0 EU/dL Nitrites NEGATIVE  NEG Leukocyte Esterase NEGATIVE  NEG Radiologic Studies -  
XR CHEST PORT Final Result CT HEAD WO CONT Final Result MRI BRAIN WO CONT    (Results Pending) CT Results  (Last 48 hours) 10/15/18 0858  CT HEAD WO CONT Final result Impression:  IMPRESSION:  
   
Evidence of atrophy and advanced chronic microvascular changes mainly in the  
deep white matter. No evidence of an acute intracranial process seen. No hemorrhage, definite acute  
infarct, or mass effect noted. No significant change noted since 9/30/2018. Code S result given to Dr. Mario Che in the ED at 0 910 hours by Dr. Gunner Stephen. Narrative:  EXAM: CT head INDICATION: Aphasia, new onset weakness, code S.  
   
COMPARISON: MR brain 9/30/2018. TECHNIQUE: Axial CT imaging of the head was performed without intravenous  
contrast. Coronal and sagittal reconstructions were obtained. Dose reduction: One or more dose reduction techniques were used on this CT:  
automated exposure control, adjustment of the mAs and/or kVp according to  
patient's size, and iterative reconstruction techniques. The specific techniques  
utilized on this CT exam have been documented in the patient's electronic medical record.  
_______________ FINDINGS:  
   
BRAIN PARENCHYMA: There is no evidence of acute intracranial hemorrhage, mass  
effect, midline shift, or herniation. There is mild diffuse atrophy. No definite CT evidence of acute cortical infarct is seen. Patchy periventricular white  
matter low density changes are compatible with areas of chronic ischemia and  
microinfarcts. Vascular calcifications are present. VENTRICLES/EXTRA-AXIAL SPACES/MENINGES: The ventricles and sulci are normal in  
their size and configuration. OSSEOUS STRUCTURES: No fracture is seen. PARANASAL SINUSES/MASTOIDS: Visualized paranasal sinuses and mastoid air cells  
are clear. ORBITS: The visualized orbits are unremarkable. OTHER: None.    
   
_______________ CXR Results  (Last 48 hours) 10/15/18 0938  XR CHEST PORT Final result Impression:  Impression: Hypoinflation. PA and lateral views suggested when feasible to exclude any  
bibasilar atelectasis or pleural effusion. Narrative:  A portable AP radiograph of the chest was obtained at 0930 hours:  
INDICATION:  Meets SIRS criteria. COMPARISON:  Multiple prior studies most recent being 10/10/2018. FINDINGS:   
   
Heart and mediastinum: Unremarkable. Lungs and pleura: Lungs are hypoinflated. No definite consolidation or pleural  
effusion is seen. Aorta: Unremarkable. Bones: Within normal limits for age. Other: None. Medications given in the ED- Medications  
sodium chloride (NS) flush 5-10 mL (not administered)  
sodium chloride 0.9 % bolus infusion 1,000 mL (1,000 mL IntraVENous New Bag 10/15/18 1148) aspirin (ASA) suppository 600 mg (600 mg Rectal Given 10/15/18 1157) Medical Decision Making I am the first provider for this patient.  
 
I reviewed the vital signs, available nursing notes, past medical history, past surgical history, family history and social history. Vital Signs-Reviewed the patient's vital signs. Pulse Oximetry Analysis - 98% on 4L O2 via NC. Cardiac Monitor: 
Rate: 99 bpm 
Rhythm: sinus rhythm EKG interpretation: (Preliminary) 9:45 AM  
NSR at 95 bpm. QTc at 485 ms. Negative STEMI. EKG read by General Vladislav DO at 9:49 AM  
 
Records Reviewed: Nursing Notes and Old Medical Records Provider Notes (Medical Decision Making): 80year old female. Hx of recent right parietal stroke and recent colon resection. Pt was taken emergently to CT scanner and pending evaluation from teleneurology and their recommendations. Procedures: 
Procedures ED Course:  
 
8:46 AM Code S called overhead. Code S protocol initiated. Awaiting pt who is currently in the field. 8:49 AM Initial assessment performed. Pt then wheeled to CT scanner immediately. 9:22 AM Discussed patient's history, exam, and available diagnostics results with Dr. Corrine Rosas, teleneurology, who agree with recommends for telemetry admission and MRI however concern for other infectious etiology. If able to tolerate PO, recommends ASA, however with right sided facial droop and aphasic, will hold off on PO ASA at this time due to aspiration concerns. Will give rectal ASA. 12:22 PM Pt unable to undergo MRI due to her recent colon resection with staples. Due to new stroke symptoms of R facial droop, R sided weakness ( prior stroke L sided deficits), will admit. Discussed patient's history, exam, and available diagnostics results with Donovan Roche MD, internal medicine, who agree with admitting to telemetry. Diagnosis and Disposition Critical Care Time: 
I have spent 150 minutes of critical care time involved in lab review, consultations with specialist, family decision-making, and documentation. During this entire length of time I was immediately available to the patient. Critical Care: The reason for providing this level of medical care for this critically ill patient was due a critical illness that impaired one or more vital organ systems such that there was a high probability of imminent or life threatening deterioration in the patients condition. This care involved high complexity decision making to assess, manipulate, and support vital system functions, to treat this degreee vital organ system failure and to prevent further life threatening deterioration of the patients condition. Core Measures: 
For Hospitalized Patients: 
 
1. Hospitalization Decision Time: The decision to hospitalize the patient was made by Niecy Hayes DO at 8:46 AM on 10/15/2018 2. Aspirin: Aspirin was given 12:23 PM  Patient is being admitted to the hospital by Lewis Damon MD. The results of their tests and reasons for their admission have been discussed with them and/or available family. They convey agreement and understanding for the need to be admitted and for their admission diagnosis. CONDITIONS ON ADMISSION: 
Sepsis is not present at the time of admission. Deep Vein Thrombosis is not present at the time of admission. Thrombosis is not present at the time of admission. Urinary Tract Infection is not present at the time of admission. Pneumonia is not present at the time of admission. MRSA is not present at the time of admission. Wound infection is not present at the time of admission. Pressure Ulcer is not present at the time of admission. CLINICAL IMPRESSION: 
 
1. Cerebrovascular accident (CVA), unspecified mechanism ([address] 2. Colonic mass 3. Aphasia 4. Weakness PLAN: 
1. ADMIT to telemetry 
_______________________________ Attestations: This note is prepared by Oriana Arias, acting as Scribe for Niecy Hayes DO.  
 
Niecy Hayes DO:  The scribe's documentation has been prepared under my direction and personally reviewed by me in its entirety. I confirm that the note above accurately reflects all work, treatment, procedures, and medical decision making performed by me. 
_______________________________

## 2018-10-15 NOTE — PROGRESS NOTES
Patient drowsy and difficult to arouse. Neurology at bedside and performed assessment. Neurology consulted with Primary and transfer order to ICU was placed. Will continue to monitor.

## 2018-10-15 NOTE — H&P
History & Physical 
Patient: Evelyn Mathur MRN: [id number]  CSN: [id number] YOB: 1932  Age: 80 y.o. Sex: female DOA: 10/15/2018 Primary Care Provider:  Александр Zurita MD 
 
 
Assessment/Plan Patient Active Problem List  
Diagnosis Code  Dyspnea R06.00  Troponin level elevated R74.8  Wheezing R06.2  Sarcoidosis D86.9  Diabetes ([facility] E11.9  Hypoxia R09.02  Stroke ([facility] I63.9  Syncope R55  Chronic respiratory failure with hypoxia, on home O2 therapy (HCC) J96.11, Z99.81  
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  
 Colon cancer ([facility] C18.9 Admit to ICU Resp - Chronic respiratory failure - on oxygen by NC, no acute respiratory issues. 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 Neb treatment as needed. ID - no evidence of infection, wbc and lactic acid in normal range. No fever. CVS - History of A-fib - EKG with NSR. Echo done 2 weeks ago with normal LVF, grade 1 diastolic dysfunction. lexiscan 2 weeks ago negative for ischemia. Permissive HTN Heme/onc - Follow H&H, plts. Renal - Trend BUN, Cr, follow I/O, Check and replace Mg, K, phos. SHAWN - pre renal, gentle IVF, Endocrine -  Follow [facility] Neuro - Acute CVA -  
MRI done 2 weeks ago showed Possible tiny area of acute right parietal infarction, evident on diffusion 
only without hemorrhage or mass effect. However aphasia is new and she has right sided deficits. Cannot get MRI secondary to staples, will get once staples are removed. Aspirin rectally. Permissive HTN Neurology consulted GI - NPO for now. SLP hernán. 
Adenocarcinoma of colon - s/p right colectomy. 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  
 
Prophylaxis - DVT: lovenox,  
 
75 minutes of critical care time spent in the direct evaluation and treatment of this high risk patient. The reason for providing this level of medical care for this critically ill patient was due a critical illness that impaired one or more vital organ systems such that there was a high probability of imminent or life threatening deterioration in the patients condition. This care involved high complexity decision making to assess, manipulate, and support vital system functions, to treat this degreee vital organ system failure and to prevent further life threatening deterioration of the patients condition. Estimated length of stay : TBD 
 
CC: aphasia HPI:  
 
Adan Xie is a 80 y.o. female who has past history of sarcoidosis, chronic respiratory failure, colon ca, DM, HTN, recent CVA and recent colectomy is brought to ER from [address] [place] [address] with concerns of Aphasia. Patient not able to provide any reliable history, daughter reports that she went to see her patient last night and she bathe her. Later she noticed patient mumbling but not making any sense. This morning patient will not speak and when she tried to stand patient she would lean on side. EMS called and patient brought to ER. She was recently discharged from this hospital. She was admitted for syncope, work up showed MRI findings of Possible tiny area of acute right parietal infarction, evident on diffusion only without hemorrhage or mass effect. She was also noted to be anemic, colonoscopy showed colon ca. 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 She was discharged on aspirin and plavix. In ER code S called. CT head Evidence of atrophy and advanced chronic microvascular changes mainly in the deep white matter. No evidence of an acute intracranial process seen. No hemorrhage, definite acute 
infarct, or mass effect noted. No significant change noted since 9/30/2018. Past Medical History:  
Diagnosis Date  Acid reflux  Arthritis  Atrial fibrillation ([facility]  Auto[facility] disease [facility] Sarcoidosis  [facility] cancer ([facility]  Diabetes ([facility]  High cholesterol  Hypertension  Sarcoidosis of lung ([facility]  Stroke ([facility] Past Surgical History:  
Procedure Laterality Date  COLONOSCOPY N/A 10/3/2018 COLONOSCOPY, POLYPECTOMY, BIOPSY, ENDOSCOPIC TATTOO performed by Anthony Simms MD at [address] COLECTOMY History reviewed. No pertinent family history. Social History Social History  Marital status: UNKNOWN Spouse name: N/A  
 Number of children: N/A  
 Years of education: N/A Social History Main Topics  Smoking status: Former Smoker Packs/day: 2.50 Years: 30.00 Types: Cigarettes  Smokeless tobacco: Never Used  Alcohol use No  
 Drug use: No  
 Sexual activity: Not Asked Other Topics Concern  None Social History Narrative Prior to Admission medications Medication Sig Start Date End Date Taking? Authorizing Provider  
escitalopram oxalate (LEXAPRO) 10 mg tablet Take 10 mg by mouth daily. Yes Historical Provider  
lisinopril-hydroCHLOROthiazide (ZESTORETIC) 20-12.5 mg per tablet Take 1 Tab by mouth daily. Yes Historical Provider  
clopidogrel (PLAVIX) 75 mg tab Take 75 mg by mouth daily. Yes Historical Provider  
aspirin (ASPIRIN) 325 mg tablet Take 1 Tab by mouth daily. 10/13/18  Yes Madi Pate MD  
atorvastatin (LIPITOR) 80 mg tablet Take 1 Tab by mouth daily. 10/13/18  Yes Madi Pate MD  
fluticasone-vilanterol (BREO ELLIPTA) 100-25 mcg/dose inhaler Take 1 Puff by inhalation daily. 10/13/18  Yes Madi Pate MD  
metoprolol tartrate (LOPRESSOR) 25 mg tablet Take 0.5 Tabs by mouth every twelve (12) hours. 10/12/18  Yes Madi Pate MD  
polyethylene glycol [facility]) 17 gram packet Take 1 Packet by mouth daily.  10/12/18  Yes Madi Pate MD  
 montelukast (SINGULAIR) 10 mg tablet Take 10 mg by mouth nightly. Yes Historical Provider  
ranitidine (ZANTAC) 150 mg tablet Take 150 mg by mouth two (2) times a day. Yes Historical Provider  
furosemide (LASIX) 20 mg tablet Take  by mouth daily. Yes Historical Provider  
glipiZIDE (GLUCOTROL) 5 mg tablet Take 5 mg by mouth two (2) times a day. Yes Historical Provider  
metFORMIN (GLUCOPHAGE) 500 mg tablet Take 500 mg by mouth two (2) times daily (with meals). Yes Historical Provider  
pantoprazole (PROTONIX) 40 mg tablet Take 40 mg by mouth daily. Yes Misael Rivers MD  
mometasone (NASONEX) 50 mcg/actuation nasal spray 2 Sprays daily. Historical Provider  
albuterol (PROVENTIL HFA, VENTOLIN HFA, PROAIR HFA) 90 mcg/actuation inhaler Take 1-2 Puffs by inhalation every six (6) hours as needed for Wheezing. 8/10/16   Garima Vang MD  
 
 
No Known Allergies Review of Systems Unable to obtain reliable ROS Physical Exam:  
 
Physical Exam: 
Visit Vitals  /56 (BP 1 Location: Left arm, BP Patient Position: At rest)  Pulse 95  Temp 97.9 °F (36.6 °C)  Resp 20  
 Ht 5' 2\" (1.575 m)  SpO2 100% O2 Flow Rate (L/min): 3 l/min O2 Device: Nasal cannula Temp (24hrs), Av.4 °F (36.9 °C), Min:97.9 °F (36.6 °C), Max:98.8 °F (37.1 °C) General:  Awake, aphasic, only nods yes or no, follows commands. Head:  Normocephalic, without obvious abnormality, atraumatic. Eyes:  Conjunctivae/corneas clear, sclera anicteric, PERRL, EOMs intact. Nose: Nares normal. No drainage or sinus tenderness. Throat: Lips, mucosa, and tongue normal.   
Neck: Supple, symmetrical, trachea midline, no adenopathy. Lungs:   Clear to auscultation bilaterally. Heart:  Regular rate and rhythm, S1, S2 normal, no murmur, click, rub or gallop. Abdomen: Soft, non-tender. Bowel sounds normal. No masses,  No organomegaly. Incision site good. Extremities: Extremities normal, atraumatic, no cyanosis or edema. Capillary refill normal.  
Pulses: 2+ and symmetric all extremities. Skin: Skin color pink, turgor normal. No rashes or lesions Neurological Exam:  
Mental Status:  Alert , co-operative ,Aphasic . Cranial Nerves:  Cannot access cranial nerves, patient inconsistent with following commands. Right facial weakness, could not access eye movements and peripheral field Motor:  Right sided 1/5 strength, left side 3-4/5 strength. Reflexes:  Deep tendon reflexes 2+/4 and symmetrical.   
Sensory:  Not able to test.   
Gait:  Not tested. Cerebellar:  Not able to test.   
 
 
 
Labs Reviewed: 
 
CMP:  
Lab Results Component Value Date/Time  10/15/2018 09:00 AM  
 K 4.7 10/15/2018 09:00 AM  
  10/15/2018 09:00 AM  
 CO2 29 10/15/2018 09:00 AM  
 AGAP 8 10/15/2018 09:00 AM  
  (H) 10/15/2018 09:00 AM  
 BUN 22 (H) 10/15/2018 09:00 AM  
 CREA 1.51 (H) 10/15/2018 09:00 AM  
 GFRAA 40 (L) 10/15/2018 09:00 AM  
 GFRNA 33 (L) 10/15/2018 09:00 AM  
 CA 8.3 (L) 10/15/2018 09:00 AM  
 ALB 2.5 (L) 10/15/2018 09:00 AM  
 TP 5.7 (L) 10/15/2018 09:00 AM  
 GLOB 3.2 10/15/2018 09:00 AM  
 AGRAT 0.8 10/15/2018 09:00 AM  
 SGOT 26 10/15/2018 09:00 AM  
 ALT 21 10/15/2018 09:00 AM  
 
CBC:  
Lab Results Component Value Date/Time WBC 9.0 10/15/2018 09:00 AM  
 HGB 8.7 (L) 10/15/2018 09:00 AM  
 HCT 29.5 (L) 10/15/2018 09:00 AM  
  10/15/2018 09:00 AM  
 
All Cardiac Markers in the last 24 hours: No results found for: CPK, CK, CKMMB, CKMB, RCK3, CKMBT, CKNDX, CKND1, YESENIA, TROPT, TROIQ, RENY, TROPT, TNIPOC, BNP, BNPP Procedures/imaging: see electronic medical records for all procedures/Xrays and details which were not copied into this note but were reviewed prior to creation of Plan CC: Peng Guerrero MD

## 2018-10-15 NOTE — ROUTINE PROCESS
Patient arrived via stretcher from CT via ER, initial assessment done, NIH scale documented, bedside report received. Patient alert, aphasic, minimal movement to RUE and RLE, LUE and LLE weak, clear, diminished breath sounds on 4LNC, midline abdominal incision with staples, dressing clean, dry and intact, will continue to monitor patient.

## 2018-10-15 NOTE — CONSULTS
NEUROLOGY CONSULTATION NOTE Patient: Doris Mariscal MRN: [id number]  CSN: [id number] YOB: 1932  Age: 80 y.o. Sex: female DOA: 10/15/2018 LOS:  LOS: 0 days Requesting Physician: Dr. Phi Dill Reason for Consultation: stroke HISTORY OF PRESENT ILLNESS:  
 
Doris Mariscal is a 80 y.o. female who has past history of sarcoidosis, chronic respiratory failure, colon ca, DM, HTN, recent CVA and recent colectomy is brought to ER from [facility] with concerns of Aphasia. Patient not able to provide any reliable history, daughter reports that she went to see her patient last night and she bathe her. At 1:30 AM, she noticed patient mumbling but not making any sense. She was not reponding to surroundings, with eyes half closed. Patient continues to mumble, and has right sided facial droop; when she tried to stand patient she would lean on side. She was recently discharged from this hospital. She was admitted for syncope, work up showed MRI findings of  tiny area of acute right parietal infarction, evident on diffusion only without hemorrhage or mass effect. She was also noted to be anemic, colonoscopy showed colon cancer. 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 She was discharged on aspirin and plavix. In ER code S called. Telestroke was evaluating the patient. CT head shows evidence of atrophy and advanced chronic microvascular changes mainly in the deep white matter. No evidence of an acute intracranial process seen. No hemorrhage, definite acute infarct, or mass effect noted. No significant change noted since 9/30/2018. MRI is unable to perform due to staples from recent GI surgery. CTA head and neck was unable to perform due to SHAWN VS CKD. Stroke Work-up: 
Brain MRI: Mri Brain Wo Cont Result Date: 9/30/2018 Impression: 1.  Possible tiny area of acute right parietal infarction, evident on diffusion only without hemorrhage or mass effect. 2. Moderately prominent ischemic white matter change. Ischemic changes in the thalami. 3. Chronic left frontal parietal and small right frontal cortical/subcortical infarctions. Numerous chronic bilateral cerebellar hemisphere infarctions. Ct Head Wo Cont Result Date: 10/15/2018 IMPRESSION: No acute intracranial abnormalities. Ct Head Wo Cont CTA of head and neck: 
Echocardiogram:  
Lipid panel:  
Lab Results Component Value Date/Time Cholesterol, total 175 09/30/2018 10:00 AM  
 HDL Cholesterol 81 (H) 09/30/2018 10:00 AM  
 LDL, calculated 70 09/30/2018 10:00 AM  
 VLDL, calculated 24 09/30/2018 10:00 AM  
 Triglyceride 120 09/30/2018 10:00 AM  
 CHOL/HDL Ratio 2.2 09/30/2018 10:00 AM  
 
HbA1c:  
Lab Results Component Value Date/Time Hemoglobin A1c 5.5 09/30/2018 10:00 AM  
 
 
  
PAST MEDICAL HISTORY: 
Past Medical History:  
Diagnosis Date  Acid reflux  Arthritis  Atrial fibrillation ([facility]  Autoimmune disease ([facility] Sarcoidosis  Colon cancer ([facility]  Diabetes ([facility]  High cholesterol  Hypertension  Sarcoidosis of lung ([facility]  Stroke ([facility] PAST SURGICAL HISTORY: 
Past Surgical History:  
Procedure Laterality Date  COLONOSCOPY N/A 10/3/2018 COLONOSCOPY, POLYPECTOMY, BIOPSY, ENDOSCOPIC TATTOO performed by Laine Brown MD at [address] COLECTOMY FAMILY HISTORY: 
History reviewed. No pertinent family history. SOCIAL HISTORY: 
Social History Social History  Marital status: UNKNOWN Spouse name: N/A  
 Number of children: N/A  
 Years of education: N/A Social History Main Topics  Smoking status: Former Smoker Packs/day: 2.50 Years: 30.00 Types: Cigarettes  Smokeless tobacco: Never Used  Alcohol use No  
 Drug use: No  
 Sexual activity: Not Asked Other Topics Concern  None Social History Narrative MEDICATIONS: 
Current Facility-Administered Medications Medication Dose Route Frequency  sodium chloride (NS) flush 5-10 mL  5-10 mL IntraVENous PRN  
 sodium chloride (NS) flush 5-10 mL  5-10 mL IntraVENous Q8H  
 sodium chloride (NS) flush 5-10 mL  5-10 mL IntraVENous PRN  
 [START ON 10/16/2018] aspirin (ASA) suppository 300 mg  300 mg Rectal DAILY  labetalol (NORMODYNE;TRANDATE) 20 mg/4 mL (5 mg/mL) injection 5 mg  5 mg IntraVENous Q10MIN PRN  
 heparin (porcine) injection 5,000 Units  5,000 Units SubCUTAneous Q8H  
 glucose chewable tablet 16 g  16 g Oral PRN  
 glucagon (GLUCAGEN) injection 1 mg  1 mg IntraMUSCular PRN  
 dextrose (D50W) injection syrg 25 g  50 mL IntraVENous PRN  
 insulin lispro (HUMALOG) injection   SubCUTAneous AC&HS  
 dextrose 5 % - 0.45% NaCl infusion  125 mL/hr IntraVENous CONTINUOUS Prior to Admission medications Medication Sig Start Date End Date Taking? Authorizing Provider  
escitalopram oxalate (LEXAPRO) 10 mg tablet Take 10 mg by mouth daily. Yes Historical Provider  
lisinopril-hydroCHLOROthiazide (ZESTORETIC) 20-12.5 mg per tablet Take 1 Tab by mouth daily. Yes Historical Provider  
clopidogrel (PLAVIX) 75 mg tab Take 75 mg by mouth daily. Yes Historical Provider  
aspirin (ASPIRIN) 325 mg tablet Take 1 Tab by mouth daily. 10/13/18  Yes Liliya Arizmendi MD  
atorvastatin (LIPITOR) 80 mg tablet Take 1 Tab by mouth daily. 10/13/18  Yes Liliya Arizmendi MD  
fluticasone-vilanterol (BREO ELLIPTA) 100-25 mcg/dose inhaler Take 1 Puff by inhalation daily. 10/13/18  Yes Liliya Arizmendi MD  
metoprolol tartrate (LOPRESSOR) 25 mg tablet Take 0.5 Tabs by mouth every twelve (12) hours. 10/12/18  Yes Liliya Arizmendi MD  
polyethylene glycol [facility]) 17 gram packet Take 1 Packet by mouth daily. 10/12/18  Yes Liliya Arizmendi MD  
montelukast (SINGULAIR) 10 mg tablet Take 10 mg by mouth nightly. Yes Historical Provider ranitidine (ZANTAC) 150 mg tablet Take 150 mg by mouth two (2) times a day. Yes Historical Provider  
furosemide (LASIX) 20 mg tablet Take  by mouth daily. Yes Historical Provider  
glipiZIDE (GLUCOTROL) 5 mg tablet Take 5 mg by mouth two (2) times a day. Yes Historical Provider  
metFORMIN (GLUCOPHAGE) 500 mg tablet Take 500 mg by mouth two (2) times daily (with meals). Yes Historical Provider  
pantoprazole (PROTONIX) 40 mg tablet Take 40 mg by mouth daily. Yes Misael Rivers MD  
mometasone (NASONEX) 50 mcg/actuation nasal spray 2 Sprays daily. Historical Provider  
albuterol (PROVENTIL HFA, VENTOLIN HFA, PROAIR HFA) 90 mcg/actuation inhaler Take 1-2 Puffs by inhalation every six (6) hours as needed for Wheezing. 8/10/16   Bonnie Ford MD  
 
 
ALLERGIES: 
No Known Allergies Review of Systems GENERAL: No fevers or chills. HEENT: No change in vision, earache, tinnitus, sore throat or sinus congestion. NECK: No pain or stiffness. CARDIOVASCULAR: No chest pain or pressure. No palpitations. PULMONARY: No shortness of breath, cough or wheeze. GASTROINTESTINAL: No abdominal pain, nausea, vomiting or diarrhea. GENITOURINARY: No urinary frequency, urgency, hesitancy or dysuria. MUSCULOSKELETAL: No joint or muscle pain, no back pain, no recent trauma. DERMATOLOGIC: No rash, no itching, no lesions. ENDOCRINE: No polyuria, polydipsia, no heat or cold intolerance. No recent change in weight. HEMATOLOGICAL: No anemia or easy bruising or bleeding. NEUROLOGIC: Aphasia, weakness PHYSICAL EXAMINATION:  
 
Visit Vitals  BP 99/54 (BP 1 Location: Left arm, BP Patient Position: At rest)  Pulse 98  Temp 98.3 °F (36.8 °C)  Resp 20  
 Ht 5' 2\" (1.575 m)  SpO2 97% O2 Flow Rate (L/min): 3 l/min O2 Device: Nasal cannula GENERAL: Pleasant, in no apparent distress. HEENT: Moist mucous membranes, sclerae anicteric, scalp is atraumatic. CVS: Regular rate and rhythm, no murmurs or gallops. No carotid bruits. PULMONARY: Clear to auscultation bilaterally. No rales or rhonchi. No wheezing. EXTREMITIES: Normal range of motion at all sites. No deformities. ABDOMEN: Soft, nontender. SKIN: No rashes or ecchymoses. Warm and dry. NEUROLOGIC: somnolent, arousable to voice, follow simple verbal commands, express aphasia Cranial nerves: right facial weakness. Left gaze preference. Right hemianopsia. Extraocular movements are intact with no nystagmus. Visual fields are full to confrontation. PERRL. Tongue is midline. Palate elevates symmetrically. Hearing intact to speech. Sternocleidomastoid and trapezius strengths are full bilaterally. Motor: 2/5 RUE, 3/5 RLE, muscle tone is reduced on right upper and lower limbs, Normal tone and normal bulk on left upper and lower limbs. Sensory: pain withdraw from left upper and lower limbs, no pain withdraw from right sided limbs. Coordination:deferred due to aphasia and somnolence Deep tendon reflexes: 2+ at biceps, brachioradialis, patella and ankles bilaterally. Toes are  Mute. Gait assessment: deferred due to weakness Labs: Results:  
   
Chemistry Recent Labs 10/15/18 
 0900 GLU  119* NA  141  
K  4.7 CL  104 CO2  29 BUN  22* CREA  1.51* CA  8.3* AGAP  8  
BUCR  15 AP  61  
TP  5.7* ALB  2.5*  
GLOB  3.2 AGRAT  0.8 CBC w/Diff Recent Labs 10/15/18 
 0900 WBC  9.0  
RBC  4.08* HGB  8.7* HCT  29.5*  
PLT  392 GRANS  75* LYMPH  15* EOS  1 Cardiac Enzymes No results for input(s): CPK, CKND1, YESENIA in the last 72 hours. No lab exists for component: Jose Raul Tyler Coagulation No results for input(s): PTP, INR, APTT in the last 72 hours. No lab exists for component: INREXT Lipid Panel Lab Results Component Value Date/Time  Cholesterol, total 175 09/30/2018 10:00 AM  
 HDL Cholesterol 81 (H) 09/30/2018 10:00 AM  
 LDL, calculated 70 09/30/2018 10:00 AM  
 VLDL, calculated 24 09/30/2018 10:00 AM  
 Triglyceride 120 09/30/2018 10:00 AM  
 CHOL/HDL Ratio 2.2 09/30/2018 10:00 AM  
  
BNP No results for input(s): BNPP in the last 72 hours. Liver Enzymes Recent Labs 10/15/18 
 0900 TP  5.7* ALB  2.5* AP  61 SGOT  26 Thyroid Studies No results found for: T4, T3U, TSH, TSHEXT [place] ASSESSMENT/IMPRESSION: 79 YO AAF presents with sudden onset of aphasia, dysarthria, right sided weakness,mer neglect and hemianopsia. CT head shows no acute changes. 1. Acute ischemic stroke 2. Encephalopathy: side effect of morphine vs cerebral edema vs encephalopathy secondary to hypoglycemia vs. complex partial seizures 3. Colon cancer status post surgery 4. SHAWN vs CKD TPA was not given due to recent stroke, and GI surgery, she is also out of tpa window. MRI and CTA were not performed due to contraindication of staples from GI surgery and elevated creatine. Initial recommendations: 
Please use stroke admission order sets. 1. Aspirin 325 mg po 2. Recheck BUN and Creatine, if decrease then do CTA head and neck 3. EEG tomorrow if she continues to be somnolent. 4. Please consult general surgery or GI surgery to remove staple if possible, in order to perform MRI. 5. Please limit narcotics or benzo if possible. 6. Permissive hypertension (do not treat if BP is not >200/110, prefer prn labetolol 5mg) 7. IV normal saline continuous infusion 100-125 ml/h 
8. Euglycemia with sliding scale insulin 9. Euthermia with acetaminophen 650mg Q6h prn for fever Further recommendations to follow. Signed:  
Traci Frias MD 
10/15/2018 
7:13 PM

## 2018-10-15 NOTE — ED NOTES
TRANSFER - OUT REPORT: 
 
Verbal report given to [facility] on [address]  being transferred to ([id number] for routine progression of care Report consisted of patients Situation, Background, Assessment and  
Recommendations(SBAR). Information from the following report(s) SBAR, ED Summary and MAR was reviewed with the receiving nurse. Lines:    
 
Opportunity for questions and clarification was provided. Patient transported with: 
 Monitor O2 @ 4 liters Tech

## 2018-10-15 NOTE — ED NOTES
Phleobotomy/lab called to attempt to collect second blood culture as 3 ED staff members have already attempted

## 2018-10-15 NOTE — PROGRESS NOTES
Admission Medication Reconciliation has been performed on this ED patient consisting of interview of the patient regarding their PTA Home Medication List, Allergies and PMH as well as obtaining outpatient pharmacy information. Interviewed patient's family member Batsheva Neil who is a good historian and is health literate. Batsheva Neil supplied med list verbally via info from [facility]. Batsheva Neil denies any OTC medications at this time. Medications are managed by: multiple family members including Batsheva Neil Patient's outpatient pharmacy is [address] [place] [address] PAML was not marked complete and did  require modification. Admission orders have not already been written. Medication Compliance Issues and/or Medication Concerns: 1. Pt was recently admitted through the ED on Sunday 9/30/18 and discharged to [facility] for rehab on 10/12/18. 2.  Obtained current medication administration info from mYwindow 2 at [facility] as of 10/15/18. 3.  Obtained home medication information from family member Batsheva Neil current as of prior to 9/30 admission. New additions to PAML:  lexapro, zestoretic, plavix . Current meds per [employer] fill data. Deletions from PAML:  proventil and duo neb for nebulizer. PAML entries that were current prior to last admission but were discontinued at discharge 10/12/18:  celebrex, Zocor Pre-existing PAML entry from 8/2016 that was NOT current prior to last admission, was ordered for admission and was continued at discharge: lasix Pre-existing PAML entry from 2/2015 that was NOT current prior to last admission, was not ordered for admission and was continued at discharge: protonix Medications that were started during last admission and continued at discharge: ASA, Lipitor, Breo, lopressor, miralax Margaret Aylin [facility] Clinical Pharmacist 
[phone]

## 2018-10-15 NOTE — PROGRESS NOTES
conducted an initial consultation and Spiritual Assessment for [employer], who is a 80 y.o.,female. Patient's daughter was at the bedside. Another family member came in later. Patient did not talk but nodded her head for prayer. Patient was here recently and went to [facility] for rehab. The  provided the following Interventions: 
Initiated a relationship of care and support. Listened empathically. Provided information about Spiritual Care Services. Offered prayer and assurance of continued prayers on patients behalf. Chart reviewed. The following outcomes were achieved: 
 confirmed Patient's [employer] Affiliation. Family member expressed gratitude for Spiritual Care visit. Plan: 
Chaplains will continue to follow and will provide pastoral care as needed or requested.  recommends bedside caregivers page  on duty if patient shows signs of acute spiritual or emotional distress. [address], M.Div. Board Certified [employer] [phone] - Office

## 2018-10-15 NOTE — ED NOTES
In and out cath done. 1 drop of urine obtained. Depends had been removed that was saturated. Dr Gabriella Nava made aware.

## 2018-10-15 NOTE — PROGRESS NOTES
Dr. Samuel Marie notified that patient is unable to have a MRI due to staples intact to abdominal incision. Dr. Samuel Marie ordered to discontinue MRI. Will continue to monitor. Patient stable.

## 2018-10-15 NOTE — PROGRESS NOTES
Noted pt recently discharged 10-12-18 from [facility], cm will remain available for assistance for readmission prevention if appropriate.

## 2018-10-16 NOTE — PROGRESS NOTES
Reason for Readmission:     Chart reviewed and Pt has suffered an additional stroke. CM spoke with Pt and daughter Dionte Lara (pt identified her as best child to speak with). They are interested in acute rehab at [facility] if she is a candidate at time of dc. Pt and daughter both understand if she becomes functional and above this level of care we would need to look back into SNF for placement. RRAT Score and Risk Level:     22 Level of Readmission:    HIGH Risk Care Conference scheduled:   no 
    
Resources/supports as identified by patient/family:   Pt has children that live locally Top Challenges facing patient (as identified by patient/family and CM):  TBD Finances/Medication cost?      
Transportation Support system or lack thereof? Living arrangements? Self-care/ADLs/Cognition? Current Advanced Directive/Advance Care Plan:  One on file, signed by daughter Plan for utilizing home health:   TBD Likelihood of additional readmission: TBD Transition of Care Plan:    Based on readmission, the patient's previous Plan of Care 
 has been evaluated and/or modified. The current Transition of Care Plan is:    
Chart reviewed and noted Pt had recently been discharged to [facility] for recovery. At the time of previous admission, CM staff had attempted ARU at [facility] Pt was too high functioning for that level of care to be an option. At this time, Pt is pending PT/OT/SP consults, based on the consults we will discuss with patient and family what possible options are available. Per Thereasa Burkitt, [facility] will not be able to accommodate patient back at their facility at time of dc. CM remains available to assist with needs. Care Management Interventions PCP Verified by CM: Yes Physical Therapy Consult: Yes Occupational Therapy Consult: Yes Speech Therapy Consult: Yes Current Support Network: Family Lives [place] Confirm Follow Up Transport: Family Plan discussed with Pt/Family/Caregiver: Yes Freedom of Choice Offered:  Yes

## 2018-10-16 NOTE — PROGRESS NOTES
Hospitalist Progress Note Patient: Chanel Stockton MRN: [id number]  CSN: [id number] YOB: 1932  Age: 80 y.o. Sex: female DOA: 10/15/2018 LOS:  LOS: 1 day Assessment/Plan Patient Active Problem List  
Diagnosis Code  Dyspnea R06.00  Troponin level elevated R74.8  Wheezing R06.2  Sarcoidosis D86.9  Diabetes ([facility] E11.9  Hypoxia R09.02  Stroke ([facility] I63.9  Syncope R55  Chronic respiratory failure with hypoxia, on home O2 therapy (HCC) J96.11, Z99.81  
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  
 Colon cancer ([facility] C18.9  
  
 
 
 
81 yo female admitted for aphasia, CVA. Patient awake alert, answering questions. Resp - Chronic respiratory failure - on oxygen by NC, no acute respiratory issues. 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 Neb treatment as needed. 
  
ID - no evidence of infection, wbc and lactic acid in normal range. No fever. 
  
CVS - History of A-fib - EKG with NSR. Echo done 2 weeks ago with normal LVF, grade 1 diastolic dysfunction. lexiscan 2 weeks ago negative for ischemia. Permissive HTN 
  
Heme/onc - Follow H&H, plts. 
  
Renal - Trend BUN, Cr, follow I/O, Check and replace Mg, K, phos. SHAWN - pre renal, improving, gentle IVF,  
  
Endocrine -  Follow FSG 
  
Neuro - Acute CVA -  
MRI done 2 weeks ago showed Possible tiny area of acute right parietal infarction, evident on diffusion 
only without hemorrhage or mass effect. However aphasia is new and she has right sided deficits. Cannot get MRI secondary to staples, will get once staples are removed. Follow up CT head with no acute findings On aspirin Permissive HTN Neurology following GI -  
SLP eval. 
Recommend pureed diet Adenocarcinoma of colon - s/p right colectomy. 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 
  
Prophylaxis - DVT: lovenox, Disposition : TBD Review of systems General: No fevers or chills. Cardiovascular: No chest pain or pressure. No palpitations. Pulmonary: No shortness of breath. Gastrointestinal: No nausea, vomiting. Physical Exam: 
General: Awake, cooperative, no acute distress   
HEENT: NC, Atraumatic. PERRLA, anicteric sclerae. Lungs: CTA Bilaterally. No Wheezing/Rhonchi/Rales. Heart:  Regular  rhythm,  No murmur, No Rubs, No Gallops Abdomen: Soft, Non distended, Non tender.  +Bowel sounds, Extremities: No c/c/e Psych:   Not anxious or agitated. Neurological Exam:  
Mental Status:  Alert , co-operative , memory intact . Cranial Nerves:  Intact visual fields. PERRL, EOM's full, no nystagmus, no ptosis. Facial sensation is normal. Facial movement is symmetric. Palate is midline. Normal sternocleidomastoid strength. Tongue is midline. Hearing is intact bilaterally. Motor:  5/5 strength in left upper and lower proximal and distal muscles. 4/5 on right side Normal bulk and tone. Reflexes:  Deep tendon reflexes 2+/4 and symmetrical.   
Sensory:  Normal and symmetric bilaterally. Gait:  Not tested. Cerebellar:  Not tested Vital signs/Intake and Output: 
Visit Vitals  /73 (BP 1 Location: Left arm, BP Patient Position: At rest)  Pulse (!) 108  Temp 98.1 °F (36.7 °C)  Resp 23  
 Ht 5' 2\" (1.575 m)  SpO2 91% Current Shift:  10/16 0701 - 10/16 1900 In: 240 [P.O.:240] Out: 900 [Urine:900] Last three shifts:  10/14 1901 - 10/16 0700 In: 235.4 [I.V.:235.4] Out: 1100 [Urine:1100] Labs: Results:  
   
Chemistry Recent Labs 10/16/18 
 0504  10/15/18 
 1949  10/15/18 
 0900 GLU  126*   --   119* NA  143   --   141  
K  4.1   --   4.7 CL  108   --   104 CO2  28   --   29 BUN  16  20*  22* CREA  1.29  1.41*  1.51* CA  8.1*   --   8.3* AGAP  7   --   8  
BUCR  12   --   15  
AP   --    --   61  
TP   --    --   5.7* ALB   --    --   2.5*  
GLOB   --    --   3.2 AGRAT   --    --   0.8 CBC w/Diff Recent Labs 10/16/18 
 0504  10/15/18 
 0900 WBC  6.7  9.0  
RBC  3.56*  4.08* HGB  7.5*  8.7* HCT  26.4*  29.5*  
PLT  301  392 GRANS   --   75* LYMPH   --   15* EOS   --   1 Cardiac Enzymes No results for input(s): CPK, CKND1, YESENIA in the last 72 hours. No lab exists for component: Nahomi Roam Coagulation No results for input(s): PTP, INR, APTT in the last 72 hours. No lab exists for component: INREXT Lipid Panel Lab Results Component Value Date/Time Cholesterol, total 106 10/16/2018 05:04 AM  
 HDL Cholesterol 32 (L) 10/16/2018 05:04 AM  
 LDL, calculated 50.6 10/16/2018 05:04 AM  
 VLDL, calculated 23.4 10/16/2018 05:04 AM  
 Triglyceride 117 10/16/2018 05:04 AM  
 CHOL/HDL Ratio 3.3 10/16/2018 05:04 AM  
  
BNP No results for input(s): BNPP in the last 72 hours. Liver Enzymes Recent Labs 10/15/18 
 0900 TP  5.7* ALB  2.5* AP  61 SGOT  26 Thyroid Studies No results found for: T4, T3U, TSH, TSHEXT Procedures/imaging: see electronic medical records for all procedures/Xrays and details which were not copied into this note but were reviewed prior to creation of Plan

## 2018-10-16 NOTE — CONSULTS
Pulmonary Specialists Pulmonary, Critical Care, and Sleep Medicine Name: Mary Kay Walker MRN: [id number] : 1932 Hospital: [facility] [place] Date: 10/16/2018 Pulmonary Critical Care Initial Patient Consult IMPRESSION:  
· Principal Problem: ·   Stroke [facility]) (2018) ·  
· Improved encephalopathy, suspected from medications · SHAWN improved · Anemia, relatively stable Hgb from baseline, possible role of dilution from IVF as well · Colon cancer with recent colectomy sx 10/8/18 RECOMMENDATIONS:  
Compensated respiratory status. May use supplemental O2 via NC, wean flow for goal SPO2> 90% Aspiration prevention bundle, head of the bed at 30' all times, pulmonary hygiene care Possible CTA head today. Permissive HTN allowed per protocol Fluids: NS @ 100 cc/hr, monitor urine output Monitor hemodynamics Staff to check with surgery to find when staples could be removed for any MRI need Glycemic control Stress ulcer prophylaxis DVT prophylaxis Heparin AM labs Diet per swallow evaluation Palliative care consult Will defer respective systems problem management to primary and other consultant and follow patient in ICU with primary and other medical team. Staff to check with Neurology after CTA head for disposition Further recommendations will be based on the patient's response to recommended treatment and results of the investigation ordered. Quality Care: PPI, DVT prophylaxis, HOB elevated, Infection control all reviewed and addressed. PAIN AND SEDATION: none at present · Skin/Wound: incision care · Nutrition: diet per swallow eval 
· Prophylaxis: DVT and GI Prophylaxis reviewed. · Restraints: none 
· PT/OT eval and treat: consulted · Lines/Tubes: lines PIV ADVANCE DIRECTIVE: Full code FAMILY DISCUSSION: Spoke with patient and with her permission with family Events and notes from last 24 hours reviewed. Care plan discussed with nursing Subjective/History: Ms. Sukhjinder He has been seen and evaluated as Dr. Katt Rios requested now for assisting in ICU management. Patient is a 80 y.o. female with PMHx for sarcoidosis, chronic respiratory failure, colon neuroca s/p colectomy 10/8/18, DM, HTN, recent CVA was brought to ER from [facility] with aphasia. In ER, CT head nil acute and advanced chronic microvascular changes mainly in the deep white matter. MRI could not be performed 2' to recent surgery staples. Patient was tx to ICU after noted to be somnolent. Patient at the time of this interview awake, alert and follows all commands. The patient reports having chronic cough, dyspnea, denies fever, chills, chest pain, wheezing or hemoptysis.   
 
 
Review of Systems: 
Pertinent items are noted in HPI. Latest lactic acid:  
Lactic acid Date Value Ref Range Status 10/15/2018 1.2 0.4 - 2.0 MMOL/L Final  
 
 
Past Medical History: 
Past Medical History:  
Diagnosis Date  Acid reflux  Arthritis  Atrial fibrillation ([address]  Autoimmune disease ([address] Sarcoidosis  Colon cancer ([address]  Diabetes ([address]  High cholesterol  Hypertension  Sarcoidosis of lung ([address]  Stroke ([address] Past Surgical History: 
Past Surgical History:  
Procedure Laterality Date  COLONOSCOPY N/A 10/3/2018 COLONOSCOPY, POLYPECTOMY, BIOPSY, ENDOSCOPIC TATTOO performed by Chani Larsen MD at [address] COLECTOMY Medications: 
Prior to Admission medications Medication Sig Start Date End Date Taking? Authorizing Provider  
escitalopram oxalate (LEXAPRO) 10 mg tablet Take 10 mg by mouth daily. Yes Historical Provider  
lisinopril-hydroCHLOROthiazide (ZESTORETIC) 20-12.5 mg per tablet Take 1 Tab by mouth daily. Yes Historical Provider  
clopidogrel (PLAVIX) 75 mg tab Take 75 mg by mouth daily. Yes Historical Provider  
aspirin (ASPIRIN) 325 mg tablet Take 1 Tab by mouth daily.  10/13/18  Yes Stephenie So MD  
atorvastatin (LIPITOR) 80 mg tablet Take 1 Tab by mouth daily. 10/13/18  Yes Stephenie So MD  
fluticasone-vilanterol (BREO ELLIPTA) 100-25 mcg/dose inhaler Take 1 Puff by inhalation daily. 10/13/18  Yes Stephenie So MD  
metoprolol tartrate (LOPRESSOR) 25 mg tablet Take 0.5 Tabs by mouth every twelve (12) hours. 10/12/18  Yes Stephenie So MD  
polyethylene glycol [facility]) 17 gram packet Take 1 Packet by mouth daily. 10/12/18  Yes Stephenie So MD  
montelukast (SINGULAIR) 10 mg tablet Take 10 mg by mouth nightly. Yes Historical Provider  
ranitidine (ZANTAC) 150 mg tablet Take 150 mg by mouth two (2) times a day. Yes Historical Provider  
furosemide (LASIX) 20 mg tablet Take  by mouth daily. Yes Historical Provider  
glipiZIDE (GLUCOTROL) 5 mg tablet Take 5 mg by mouth two (2) times a day. Yes Historical Provider  
metFORMIN (GLUCOPHAGE) 500 mg tablet Take 500 mg by mouth two (2) times daily (with meals). Yes Historical Provider  
pantoprazole (PROTONIX) 40 mg tablet Take 40 mg by mouth daily. Yes Misael Rivers MD  
mometasone (NASONEX) 50 mcg/actuation nasal spray 2 Sprays daily. Historical Provider  
albuterol (PROVENTIL HFA, VENTOLIN HFA, PROAIR HFA) 90 mcg/actuation inhaler Take 1-2 Puffs by inhalation every six (6) hours as needed for Wheezing. 8/10/16   Cameron Josue MD  
 
 
Current Facility-Administered Medications Medication Dose Route Frequency  insulin lispro (HUMALOG) injection   SubCUTAneous Q6H  
 famotidine (PF) (PEPCID) 20 mg in sodium chloride 0.9% 10 mL injection  20 mg IntraVENous DAILY  sodium chloride (NS) flush 5-10 mL  5-10 mL IntraVENous Q8H  
 aspirin (ASA) suppository 300 mg  300 mg Rectal DAILY  heparin (porcine) injection 5,000 Units  5,000 Units SubCUTAneous Q8H  
 dextrose 5 % - 0.45% NaCl infusion  125 mL/hr IntraVENous CONTINUOUS Allergy: No Known Allergies Social History: 
Social History Substance Use Topics  Smoking status: Former Smoker Packs/day: 2.50 Years: 30.00 Types: Cigarettes  Smokeless tobacco: Never Used  Alcohol use No  
  
 
Family History: 
History reviewed. No family history for emphysema. Objective: 
Vital Signs:   
Blood pressure 138/75, pulse 93, temperature 98.2 °F (36.8 °C), resp. rate 23, height 5' 2\" (1.575 m), SpO2 97 %. There is no height or weight on file to calculate BMI. O2 Device: Nasal cannula O2 Flow Rate (L/min): 4 l/min Temp (24hrs), Av.2 °F (36.8 °C), Min:97.3 °F (36.3 °C), Max:98.8 °F (37.1 °C) Intake/Output:  
Last shift:      10/16 0701 - 10/16 1900 In: -  
Out: 400 [Urine:400] Last 3 shifts: 10/14 1901 - 10/16 07 In: 235.4 [I.V.:235.4] Out: 1100 [Urine:1100] Intake/Output Summary (Last 24 hours) at 10/16/18 1129 Last data filed at 10/16/18 2092 Gross per 24 hour Intake           235.42 ml Output             1500 ml Net         -1264.58 ml Physical Exam: 
General: awake, alert, follows commands, in no respiratory distress and acyanotic, cooperative, appears stated age HEENT: PERRL, fundi benign, throat normal without erythema or exudate Neck: No abnormally enlarged lymph nodes or thyroid, supple Chest: normal 
Lungs: moderate air entry, breathing normal, clear to auscultation bilaterally, normal percussion bilaterally, no tenderness/ rash Heart: Regular rate and rhythm, S1S2 present or without murmur or extra heart sounds Abdomen: non distended, bowel sounds normoactive, tympanic, abdomen is soft without significant tenderness, masses, organomegaly or guarding, rigidity, rebound Extremity: negative for edema, cyanosis, clubbing Capillary refill: normal 
Neuro: awake, alert, following commands, speech somewhat aphasic, 4/5 RUE, RLE Skin: Skin color, texture, turgor fair. Skin dry, warm, non-diaphoretic Data:  
 
Recent Results (from the past 24 hour(s)) CULTURE, BLOOD  Collection Time: 10/15/18 11:47 AM  
 Result Value Ref Range Special Requests:  LEFT AC   
 Culture result: NO GROWTH AFTER 19 HOURS    
URINALYSIS W/ RFLX MICROSCOPIC Collection Time: 10/15/18 11:55 AM  
Result Value Ref Range Color YELLOW Appearance CLEAR Specific gravity 1.013 1.005 - 1.030    
 pH (UA) 5.0 5.0 - 8.0 Protein NEGATIVE  NEG mg/dL Glucose NEGATIVE  NEG mg/dL Ketone NEGATIVE  NEG mg/dL Bilirubin NEGATIVE  NEG Blood NEGATIVE  NEG Urobilinogen 0.2 0.2 - 1.0 EU/dL Nitrites NEGATIVE  NEG Leukocyte Esterase NEGATIVE  NEG    
GLUCOSE, POC Collection Time: 10/15/18  4:23 PM  
Result Value Ref Range Glucose (POC) 62 (L) 70 - 110 mg/dL GLUCOSE, POC Collection Time: 10/15/18  4:59 PM  
Result Value Ref Range Glucose (POC) 161 (H) 70 - 110 mg/dL GLUCOSE, POC Collection Time: 10/15/18  7:44 PM  
Result Value Ref Range Glucose (POC) 98 70 - 110 mg/dL BUN Collection Time: 10/15/18  7:49 PM  
Result Value Ref Range BUN 20 (H) 7.0 - 18 MG/DL  
CREATININE Collection Time: 10/15/18  7:49 PM  
Result Value Ref Range Creatinine 1.41 (H) 0.6 - 1.3 MG/DL  
 GFR est AA 43 (L) >60 ml/min/1.73m2 GFR est non-AA 35 (L) >60 ml/min/1.73m2 GLUCOSE, POC Collection Time: 10/15/18 10:00 PM  
Result Value Ref Range Glucose (POC) 104 70 - 110 mg/dL LIPID PANEL Collection Time: 10/16/18  5:04 AM  
Result Value Ref Range LIPID PROFILE Cholesterol, total 106 <200 MG/DL Triglyceride 117 <150 MG/DL  
 HDL Cholesterol 32 (L) 40 - 60 MG/DL  
 LDL, calculated 50.6 0 - 100 MG/DL VLDL, calculated 23.4 MG/DL  
 CHOL/HDL Ratio 3.3 0 - 5.0 METABOLIC PANEL, BASIC Collection Time: 10/16/18  5:04 AM  
Result Value Ref Range Sodium 143 136 - 145 mmol/L Potassium 4.1 3.5 - 5.5 mmol/L Chloride 108 100 - 108 mmol/L  
 CO2 28 21 - 32 mmol/L Anion gap 7 3.0 - 18 mmol/L Glucose 126 (H) 74 - 99 mg/dL  BUN 16 7.0 - 18 MG/DL  
 Creatinine 1.29 0.6 - 1.3 MG/DL  
 BUN/Creatinine ratio 12 12 - 20 GFR est AA 47 (L) >60 ml/min/1.73m2 GFR est non-AA 39 (L) >60 ml/min/1.73m2 Calcium 8.1 (L) 8.5 - 10.1 MG/DL  
CBC W/O DIFF Collection Time: 10/16/18  5:04 AM  
Result Value Ref Range WBC 6.7 4.6 - 13.2 K/uL  
 RBC 3.56 (L) 4.20 - 5.30 M/uL HGB 7.5 (L) 12.0 - 16.0 g/dL HCT 26.4 (L) 35.0 - 45.0 % MCV 74.2 74.0 - 97.0 FL  
 MCH 21.1 (L) 24.0 - 34.0 PG  
 MCHC 28.4 (L) 31.0 - 37.0 g/dL RDW 28.3 (H) 11.6 - 14.5 % PLATELET 246 893 - 290 K/uL MPV 9.0 (L) 9.2 - 11.8 FL  
HEMOGLOBIN A1C WITH EAG Collection Time: 10/16/18  5:04 AM  
Result Value Ref Range Hemoglobin A1c 5.8 (H) 4.5 - 5.6 % Est. average glucose 120 mg/dL GLUCOSE, POC Collection Time: 10/16/18  7:27 AM  
Result Value Ref Range Glucose (POC) 163 (H) 70 - 110 mg/dL GLUCOSE, POC Collection Time: 10/16/18 11:14 AM  
Result Value Ref Range Glucose (POC) 153 (H) 70 - 110 mg/dL No results for input(s): FIO2I, IFO2, HCO3I, IHCO3, HCOPOC, PCO2I, PCOPOC, IPHI, PHI, PHPOC, PO2I, PO2POC in the last 72 hours. No lab exists for component: IPOC2 All Micro Results Procedure Component Value Units Date/Time CULTURE, BLOOD [[id number]] Collected:  10/15/18 1105 Order Status:  Completed Specimen:  Blood from Blood Updated:  10/16/18 2069 Special Requests: NO SPECIAL REQUESTS Culture result: NO GROWTH AFTER 19 HOURS     
 CULTURE, BLOOD [[id number]] Collected:  10/15/18 1147 Order Status:  Completed Specimen:  Blood from Blood Updated:  10/16/18 0379 Special Requests:  LEFT AC  
  Culture result: NO GROWTH AFTER 19 HOURS Telemetry: normal sinus rhythm 9/30/18 ECHO · Estimated left ventricular ejection fraction is 66 - 70%. Left ventricular mild concentric hypertrophy. Normal left ventricular wall motion, no regional wall motion abnormality noted.  Mild (grade 1) left ventricular diastolic dysfunction E/E' ratio is 12. Jone Cancer · Mitral valve non-specific thickening. Mild mitral valve regurgitation. · Mild tricuspid valve regurgitation is present. Pulmonary arterial systolic pressure is 35 mmHg. Mild pulmonary hypertension is present. · Mild pulmonic valve regurgitation is present. · Saline contrast was given to evaluate for intracardiac shunt. Right to left shunt seen at rest. 
 
Imaging: 
[x]I have personally reviewed the patients chest radiographs images and report Results from Hospital Encounter encounter on 10/15/18 XR CHEST PORT Narrative A portable AP radiograph of the chest was obtained at 0930 hours: 
INDICATION:  Meets SIRS criteria. COMPARISON:  Multiple prior studies most recent being 10/10/2018. FINDINGS:  
 
Heart and mediastinum: Unremarkable. Lungs and pleura: Lungs are hypoinflated. No definite consolidation or pleural 
effusion is seen. Aorta: Unremarkable. Bones: Within normal limits for age. Other: None. Impression Impression: Hypoinflation. PA and lateral views suggested when feasible to exclude any 
bibasilar atelectasis or pleural effusion. Results from Hospital Encounter encounter on 10/15/18 CT HEAD WO CONT Narrative EXAM: CT head INDICATION: Change in mental status COMPARISON: October 15, 2018 at 8:56 AM 
 
TECHNIQUE: Axial CT imaging of the head was performed without intravenous 
contrast. One or more dose reduction techniques were used on this CT: automated 
exposure control, adjustment of the mAs and/or kVp according to patient size, 
and iterative reconstruction techniques. The specific techniques used on this CT exam have been documented in the patient's electronic medical record. 
 
_______________ FINDINGS: 
 
BRAIN AND POSTERIOR FOSSA: There is mild atrophy. There is no intracranial 
hemorrhage, mass effect, or midline shift. Significant small vessel ischemic 
disease present. EXTRA-AXIAL SPACES AND MENINGES: There are no abnormal extra-axial fluid 
collections. CALVARIUM: Intact. SINUSES: Clear. OTHER: None. 
 
_______________ Impression IMPRESSION: 
 
No acute intracranial abnormalities. [x]See my orders for details My assessment, plan of care, findings, medications, side effects etc were discussed with: 
[x]nursing []PT/OT   
[]respiratory therapy [x]Dr. Opal Coburn  
[x]family [x]Patient [x]Total critical care time exclusive of procedures 45 minutes with complex decision making performed and > 50% time spent in face to face evaluation.  
 
Ivon Rondon MD

## 2018-10-16 NOTE — PROGRESS NOTES
Problem: Self Care Deficits Care Plan (Adult) Goal: *Acute Goals and Plan of Care (Insert Text) Initial OT STGs (10/16/2018) Within 7 days: 1. Patient will perform self feeding task w/ RUE w/ trace spillage for increased independence with ADLs. 2. Patient will perform toilet transfer with Supervision/CGA in preparation for bowel and bladder management for increased independence with ADLs. 3. Patient will perform UB dressing with setup while utilizing mer-techniques for increased independence with ADLs. 4. Patient will perform LB dressing with setup/Kimberly while utilizing mer-techniques for increased independence with ADLs. 5. Patient will visually attend to B side of environment with 1 verbal cues in preparation for ADLs. 6. Patient will perform UE exercises with SBA assist for 15 minutes to increase independence with ADLs. 7. Patient will utilize energy conservation techniques with 1 verbal cue(s) for increased independence with ADLs. 8. Patient will perform bed mobility with CGA/Kimberly following log rolling with 1 verbal cue(s) for increased independence with ADLs. 9. Patient will perform all aspects of toileting with CGA/Kimberly for increased independence with ADLs. Outcome: Progressing Towards Goal 
Occupational Therapy EVALUATION Patient: Theodore Smith (17 y.o. female) Date: 10/16/2018 Primary Diagnosis: Stroke ([address] Precautions: (recent R colectomy),  Fall, Aspiration (CVA`) ASSESSMENT : 
Based on the objective data described below, the patient presents with decreased functional strength, decreased functional balance, decreased overall activity tolerance limiting independence with ADLs. Patient well-known to this OT from previous admission last month with CVA w/ L sided deficits, complicated by findings of colon CA s/p R colectomy. Pt went to Rehab and admitted w/ R hemiparesis and expressive aphasia. Pt now w/ multiple family members, talking and joking.  Near full ROM RUE w/ only slight drift vs fatigue vs attn to task. Pt had L visual inattention on previous admission that improved. Pt now w/ possible B inattention, but difficult to assess d/t pt's cognition and attn to task. Pt may also have baseline age-related vision deficits, but unable to express d/t cognition. Pt able to speak in short sentences. Pt able to sit EOB, but continues to have deficits d/t abdominal pain from sx. Pt would benefit from continued OT services to maximize independence in ADLs. Education: Reviewed home safety, body mechanics, signs and symptoms of a stroke, risk factors, blood sugar management in relation to stroke, Reviewed Abdominal surgery Precautions, importance of movement to minimize surgical complications, and adaptive dressing techniques with patient verbalizing understanding at this time. Stroke Education: Patient educated on signs/symptoms of stroke and importance of early intervention. Patient verbalized understanding. Patient will benefit from skilled intervention to address the above impairments. Patients rehabilitation potential is considered to be Good Factors which may influence rehabilitation potential include:  
[]             None noted [x]             Mental ability/status [x]             Medical condition []             Home/family situation and support systems [x]             Safety awareness [x]             Pain tolerance/management 
[]             Other: PLAN : 
Recommendations and Planned Interventions: 
[x]               Self Care Training                  [x]        Therapeutic Activities [x]               Functional Mobility Training    [x]        Cognitive Retraining 
[x]               Therapeutic Exercises           [x]        Endurance Activities [x]               Balance Training                   [x]        Neuromuscular Re-Education [x]               Visual/Perceptual Training     [x]   Home Safety Training [x]               Patient Education                 [x]        Family Training/Education []               Other (comment): Frequency/Duration: Patient will be followed by occupational therapy 1-2 times per day/2-4 days per week to address goals. Discharge Recommendations: Rehab Further Equipment Recommendations for Discharge: To Be Determined (TBD) at next level of care SUBJECTIVE:  
Patient stated Kelley Beverage don't like him.  (girls in the room talking about \"sexy\" men in [place]; pt smiling and laughing) OBJECTIVE DATA SUMMARY:  
 
Past Medical History:  
Diagnosis Date  Acid reflux  Arthritis  Atrial fibrillation ([facility] [address]  Autoimmune disease ([facility] [address] Sarcoidosis  Colon cancer ([facility] [address]  Diabetes ([facility] [address]  High cholesterol  Hypertension  Sarcoidosis of lung ([facility] [address]  Stroke ([facility] [address] Past Surgical History:  
Procedure Laterality Date  COLONOSCOPY N/A 10/3/2018 COLONOSCOPY, POLYPECTOMY, BIOPSY, ENDOSCOPIC TATTOO performed by Leigha Reeves MD at [facility] COLECTOMY Barriers to Learning/Limitations: yes;  cognitive, sensory deficits-vision/hearing/speech and physical 
Compensate with: visual, verbal, tactile, kinesthetic cues/model Prior Level of Function/Home Situation: Pt w/ supportive family (current family at bedside seem to bring the best out of pt from previous sessions) Home Situation Home Environment:  (was d/c'd to [facility] s/p hospital admission) One/Two Story Residence: Two story, live on 1st floor Living Alone: No 
Support Systems: Family member(s) Patient Expects to be Discharged to[de-identified] Rehabilitation facility Current DME Used/Available at Home: luly Damian [x]  Right hand dominant   []  Left hand dominant Cognitive/Behavioral Status: 
Neurologic State: Alert Orientation Level: Disoriented to time;Disoriented to situation;Oriented to person;Oriented to place Cognition: Decreased attention/concentration;Decreased command following;Memory loss;Poor safety awareness Safety/Judgement: Decreased insight into deficits; Decreased awareness of need for safety;Decreased awareness of need for assistance;Decreased awareness of environment Skin: appears intact Edema: no edema noted Vision/Perceptual:   
Tracking: Requires cues, head turns, or add eye shifts to track Saccades: Unable to test secondary to decreased visual attention Convergence: Unable to test secondary due to decreased visual attention Diplopia: No   
  Will continue to assess Coordination: 
Coordination: Generally decreased, functional 
Fine Motor Skills-Upper: Left Intact; Right Intact Gross Motor Skills-Upper: Left Intact; Right Intact Balance: 
Sitting: Intact Standing: Impaired; With support Standing - Static: Fair Standing - Dynamic : Fair (-) Strength: 
Strength: Generally decreased, functional 
Tone & Sensation: 
Tone: Normal 
Sensation: Intact Range of Motion: 
AROM: Generally decreased, functional 
PROM: Generally decreased, functional 
Functional Mobility and Transfers for ADLs: 
Bed Mobility: 
Supine to Sit: Moderate assistance; Additional time Sit to Supine: Maximum assistance;Assist x2 Transfers: 
Sit to Stand: Contact guard assistance;Minimum assistance ADL Assessment:  
Feeding: Contact guard assistance;Minimum assistance; Additional time Oral Facial Hygiene/Grooming: Moderate assistance Bathing: Maximum assistance Upper Body Dressing: Maximum assistance Lower Body Dressing: Maximum assistance Toileting: Moderate assistance ADL Intervention: 
Feeding Feeding Assistance:  (noted meal w/moderate spillage on tray) Container Management: Contact guard assistance;Minimum assistance Grooming Washing Hands: Supervision/set-up (hand wipes) Upper Body Dressing Assistance Hospital Gown: Moderate assistance Lower Body Dressing Assistance Socks: Maximum assistance; Total assistance (dependent) Toileting Toileting Assistance:  (zarate catheter) Cognitive Retraining Orientation Retraining: Reorienting;Place;Situation;Time 
Problem Solving: Inductive reason; Identifying the task; Identifying the problem;General alternative solution;Deductive reason; Awareness of environment Executive Functions: Executing cognitive plans Organizing/Sequencing: Breaking task down;Prioritizing Attention to Task: Single task;Distractibility Maintains Attention For (Time): 30 seconds Following Commands: Awareness of environment; Follows one step commands/directions Safety/Judgement: Decreased insight into deficits; Decreased awareness of need for safety;Decreased awareness of need for assistance;Decreased awareness of environment Cues: Tactile cues provided;Verbal cues provided;Visual cues provided Therapeutic Exercise: RUE AAROM and functional use during bed mob Pain: 
Pre-treatment: 5/10 (Abdomen) Post-treatment: 5/10 Activity Tolerance:  
Patient able to sit EOB 10 minute(s). Patient able to complete ADLs with intermittent rest breaks. Patient limited by abdominal/surgical pain/ cognition/vision/balance/strength. Patient unsteady Please refer to the flowsheet for vital signs taken during this treatment. After treatment:  
[] Patient left in no apparent distress sitting up in chair 
[x] Patient left in no apparent distress in bed 
[x] Call bell left within reach [x] Nursing notified/Florinda [x] Caregiver present/multiple younger family members 
[] Bed alarm activated COMMUNICATION/EDUCATION:  
[x] Home safety education was provided and the patient/caregiver indicated understanding. [x] Patient/family have participated as able in goal setting and plan of care. [x] Patient/family agree to work toward stated goals and plan of care. [] Patient understands intent and goals of therapy, but is neutral about his/her participation. [] Patient is unable to participate in goal setting and plan of care. Thank you for this referral. 
Milla Velez, OTR/L Time Calculation: 24 mins G-Codes (GP) Self Care  Current  CL= 60-79%  Goal  CI= 1-19% The severity rating is based on the professional judgement & direct observation of Level of Assistance required for Functional Mobility and ADLs. Eval Complexity: History: HIGH Complexity : Extensive review of history including physical, cognitive and psychosocial history ; Examination: HIGH Complexity : 5 or more performance deficits relating to physical, cognitive , or psychosocial skils that result in activity limitations and / or participation restrictions; Decision Making:HIGH Complexity : Patient presents with comorbidities that affect occupational performance. Signifigant modification of tasks or assistance (eg, physical or verbal) with assessment (s) is necessary to enable patient to complete evaluation

## 2018-10-16 NOTE — ROUTINE PROCESS
Bedside shift change report given to Cindi Mckinney RN (oncoming nurse) by Gloria Guevara RN (offgoing nurse). Report included the following information SBAR, Kardex, Intake/Output and MAR.

## 2018-10-16 NOTE — ROUTINE PROCESS
Bedside and Verbal shift change report given to Mohamud Fuentes RN (oncoming nurse) by Jarad Gongora RN (offgoing nurse).  Report included the following information Kardex, Intake/Output, MAR and Recent Results. '

## 2018-10-16 NOTE — PROGRESS NOTES
Problem: Mobility Impaired (Adult and Pediatric) Goal: *Acute Goals and Plan of Care (Insert Text) Physical Therapy Goals Initiated 10/16/2018 and to be accomplished within 5-7 day(s) 1. Patient will move from supine <> sit with S in prep for out of bed activity and change of position. 2.  Patient will perform sit<> stand with S with LRAD in prep for transfers/ambulation. 3.  Patient will transfer from bed <> chair with S with LRAD for time up in chair for completion of ADL activity. 4.  Patient will ambulate 150 feet with LRAD/S for improved functional mobility/safe discharge. Outcome: Progressing Towards Goal 
physical Therapy EVALUATION Patient: Clay Arambula (51 y.o. female) Date: 10/16/2018 Primary Diagnosis: Stroke ([facility] [address] Precautions: Fall ASSESSMENT : 
Based on the objective data described below, the patient presents with decrease independence w/ bed mobility, transfers, gait, and step negotiation. Pt seen in supine prior to session w/ supplemental O2, IV, telemonitor donned, BP, pulse ox, and zarate catheter. Pt reports no pain at this time. Pt is alert and only able to respond to yes or no questions. Pt demonstrates aphasia and at times slurred speech. Pt demonstrates impaired coordination and apraxia during session. Pt demonstrates decrease command following and responds better to manual cueing. Pt able to stand x 2 w/ RW/GB and able to take some side steps towards the [facility] w/ RW/GB. Pt transferred back to supine after session, call bell and tray in reach, nurse notified after session. Patient will benefit from skilled intervention to address the above impairments. Patients rehabilitation potential is considered to be Fair Factors which may influence rehabilitation potential include:  
[]         None noted 
[]         Mental ability/status [x]         Medical condition 
[x]         Home/family situation and support systems 
[x]         Safety awareness [x]         Pain tolerance/management 
[]         Other: PLAN : 
Recommendations and Planned Interventions: 
[x]           Bed Mobility Training             [x]    Neuromuscular Re-Education 
[x]           Transfer Training                   []    Orthotic/Prosthetic Training 
[x]           Gait Training                          []    Modalities [x]           Therapeutic Exercises          []    Edema Management/Control 
[x]           Therapeutic Activities            [x]    Patient and Family Training/Education 
[]           Other (comment): Frequency/Duration: Patient will be followed by physical therapy twice daily to address goals. Discharge Recommendations:Acute Rehab Further Equipment Recommendations for Discharge: N/A  
 
SUBJECTIVE:  
Patient stated \"No. OBJECTIVE DATA SUMMARY:  
 
Past Medical History:  
Diagnosis Date  Acid reflux  Arthritis  Atrial fibrillation ([facility] [address]  [facility] Sarcoidosis  Colon cancer ([facility] [address]  Diabetes ([facility] [address]  High cholesterol  Hypertension  [facility]  Stroke ([facility] Past Surgical History:  
Procedure Laterality Date  COLONOSCOPY N/A 10/3/2018 COLONOSCOPY, POLYPECTOMY, BIOPSY, ENDOSCOPIC TATTOO performed by Santa Bernabe MD at [facility]  HX COLECTOMY Barriers to Learning/Limitations: yes;  physical 
Compensate with: Verbal Cues and Tactile Cues Prior Level of Function/Home Situation:  
Home Situation Home Environment: Private residence One/Two Story Residence: Two story, live on 1st floor Living Alone: No 
Support Systems: Family member(s) Patient Expects to be Discharged to[de-identified] Rehabilitation facility Current DME Used/Available at Home: luly ReynoldsCrivick Behavior: 
Neurologic State: Alert Orientation Level: Unable to verbalize Cognition: Decreased attention/concentration;Decreased command following Safety/Judgement: Decreased insight into deficits Strength:   
Strength: Generally decreased, functional 
Tone & Sensation:  
Tone: Normal 
Sensation: Intact Range Of Motion: 
AROM: Generally decreased, functional 
Functional Mobility: 
Bed Mobility: 
Supine to Sit: Moderate assistance;Maximum assistance;Assist x2 Sit to Supine: Maximum assistance;Assist x2 Transfers: 
Sit to Stand: Contact guard assistance;Minimum assistance Stand to Sit: Contact guard assistance Balance:  
Sitting: Intact Standing: Impaired; With support Standing - Static: Fair Standing - Dynamic : Fair (-)Ambulation/Gait Training: 
Distance (ft): 2 Feet (ft) Assistive Device: Gait belt;Walker, rolling Ambulation - Level of Assistance: Moderate assistance Gait Description (WDL): Exceptions to [facility] Gait Abnormalities: Decreased step clearance;Shuffling gait; Step to gait Base of Support: Narrowed Stance: Time Speed/Peggy: Shuffled; Slow Step Length: Left shortened;Right shortened Swing Pattern: Left asymmetrical;Right asymmetrical 
 
Pain: 
Pain Scale 1: Numeric (0 - 10) Pain Intensity 1: 0 Activity Tolerance:  
Fair Please refer to the flowsheet for vital signs taken during this treatment. After treatment:  
[]         Patient left in no apparent distress sitting up in chair 
[x]         Patient left in no apparent distress in bed 
[x]         Call bell left within reach [x]         Nursing notified 
[x]         Caregiver present ( family) []         Bed alarm activated COMMUNICATION/EDUCATION:  
[x]         Fall prevention education was provided and the patient/caregiver indicated understanding. [x]         Patient/family have participated as able in goal setting and plan of care. [x]         Patient/family agree to work toward stated goals and plan of care. []         Patient understands intent and goals of therapy, but is neutral about his/her participation. []         Patient is unable to participate in goal setting and plan of care. Thank you for this referral. 
Syd Alonso, PT Time Calculation: 24 mins Mobility:  Current  CL= 60-79%   Goal  CI= 1-19%. The severity rating is based on the Other Based on functional assessment

## 2018-10-16 NOTE — PROGRESS NOTES
Problem: Dysphagia (Adult) Goal: *Acute Goals and Plan of Care (Insert Text) Patient presents with moderate oropharyngeal dysphagia c/b mild increased oral prep phase, delayed/incomplete bolus formation and transit with delayed/prolonged hyolaryngeal excursion upon palpation and (+) overt s/sx aspiration c/b dry cough noted with dry solids and mechanical soft/mixed textures. Recommend puree, thin liquids, straws ok, with meds crushed in applesauce. Skilled therapy initiated; patient and family educated on aspiration risk and use of compensatory swallowing strategies including small bites/sips, reduced rate of intake, alternating bites/sips as well as diet recs. Patient may benefit from [facility] to further assess structure and function and help guide POC. Results d/w RN, Malika Sigala. Rec:  
puree, thin liquids Meds crushed in puree Aspiration precautions 90 degree positioning for all intake Small bites/sips, straws ok Reduced rate of intake Alternating bites/sips Oral care post intake Patient will: 
- Utilize compensatory swallow maneuvers (decrease bolus size, decrease rate of  intake, cyclical ingestion, etc.) to increase swallow function/safety with min  visual, verbal and/or tactile cues in 4/5 trials. - Tolerate PO trials utilizing compensatory swallow techniques (decrease bolus  size, decrease rate of intake, cyclical ingestion, etc.) without overt s/sx  aspiration/distress in 4/5 trials with min visual, verbal, and/or tactile cues. - Perform restorative oropharyngeal exercises and swallow maneuvers  (supraglottic swallow, Mendelson maneuver, effortful swallow, OMEs etc.)  to increase oropharyngeal strength/awareness/agility, tongue base retraction,  hyolaryngeal excursion, airway protection, and/or bolus clearance with min  visual, verbal and/or tactile cues in 4/5 trials. - Demonstrate the ability to adequately self-monitor swallowing skills and perform appropriate compensatory techniques to reduce s/sx of aspiration and to safely  consume least-restrictive diet with min verbal, visual and/or tactile cues in  4/5 trials. - Complete an objective measure of swallow fxn (i.e., MBSS) to assess  oropharyngeal anatomy/physiology for determination of safest least-restrictive  diet and/or appropriate rehabilitation techniques. Outcome: Progressing Towards Goal 
Speech-LAnguage Pathology bedside swallow evaluation and therapy Patient: Susannah Mcqueen (89 y.o. female) Date: 10/16/2018 Primary Diagnosis: Stroke ([facility].) Precautions: Aspiration, falls ASSESSMENT : 
As above. Patient will benefit from skilled intervention to address the above impairments. Patients rehabilitation potential is considered to be Fair Factors which may influence rehabilitation potential include:  
[]            None noted [x]            Mental ability/status [x]            Medical condition [x]            Home/family situation and support systems [x]            Safety awareness 
[]            Pain tolerance/management []            Other: PLAN : 
Recommendations and Planned Interventions: As above. Frequency/Duration: Patient will be followed by speech-language pathology 1-2 times per day/4-7 days per week to address goals. Discharge Recommendations: To Be Determined SUBJECTIVE:  
Patient stated Oh, oh, oh, I mean no.  OBJECTIVE:  
 
Past Medical History:  
Diagnosis Date  Acid reflux  Arthritis  [facility] fibrillation ([facility]  Autoimmune disease ([facility] Sarcoidosis  Colon cancer ([facility]  Diabetes ([facility]  High cholesterol  Hypertension  Sarcoidosis of lung ([facility]  Stroke ([facility] Past Surgical History:  
Procedure Laterality Date  COLONOSCOPY N/A 10/3/2018 COLONOSCOPY, POLYPECTOMY, BIOPSY, ENDOSCOPIC TATTOO performed by Anthony Simms MD at [address] COLECTOMY Prior Level of Function/Home Situation: from [facility] Diet prior to admission: Regular, per family report Current Diet: NPO Barriers to Learning/Limitations: yes;  sensory deficits-vision/hearing/speech and altered mental status (i.e.Sedation, Confusion) Compensate with: visual, verbal, tactile, kinesthetic cues/modelCognitive and Communication Status: 
Neurologic State: Alert, Eyes open spontaneously Orientation Level: Unable to verbalize Cognition: Decreased command following Perception: Cues to attend to right side of body, Tactile, Verbal 
Perseveration: No perseveration noted Safety/Judgement: Decreased insight into deficits Oral Assessment: 
Oral Assessment Labial: Decreased rate, Decreased seal 
Dentition: Edentulous, Limited, Poor, Other (comment) (Edentulous upper, very poor natural lower) Oral Hygiene:  (Howard Camargo) Lingual: Decreased rate, Decreased strength, Incoordinated, Right deviation Velum: No impairment Mandible: No impairment Gag Reflex: Other (comment) (Not tested) P.O. Trials: 
Patient Position:  (HOB at 39) Vocal quality prior to P.O.: Phonation breaks Consistency Presented: Ice chips, Mechanical soft, Mixed consistency, Puree, Solid, Thin liquid How Presented: Self-fed/presented, SLP-fed/presented, Spoon, Straw Bolus Acceptance: No impairment Bolus Formation/Control: Impaired Type of Impairment: Delayed, Incomplete, Poor, Lip closure, Mastication, Piecemeal 
Propulsion: Delayed (# of seconds), Discoordination, Tongue pumping Oral Residue: Right, Less than 10% of bolus, Lingual 
Initiation of Swallow: Delayed (# of seconds) Laryngeal Elevation: Decreased, Functional 
Aspiration Signs/Symptoms: Clear throat Pharyngeal Phase Characteristics: Easily fatigued , Effortful swallow, Poor endurance Effective Modifications: Alternate liquids/solids, Straw, Spoon, Small sips and bites Cues for Modifications: Minimal-moderate Oral Phase Severity: Moderate Pharyngeal Phase Severity : Moderate Pain: 
Pre-treatment Pain Scale 1: Numeric (0 - 10) Pain Intensity 1: 0 Post-treatment: 0 After treatment:  
[]            Patient left in no apparent distress sitting up in chair 
[x]            Patient left in no apparent distress in bed 
[x]            Call bell left within reach [x]            Nursing notified 
[]            Caregiver present 
[]            Bed alarm activated COMMUNICATION/EDUCATION:  
[x]           The patients plan of care including recommendations, planned interventions,       
    and recommended diet changes were discussed with: Registered Nurse and Physician. []            Posted safety precautions in patient's room. [x]            Patient/family have participated as able in goal setting and plan of care. []            Patient/family agree to work toward stated goals and plan of care. []            Patient understands intent and goals of therapy, but is neutral about his/her                       participation. []            Patient is unable to participate in goal setting and plan of care. Thank you for this referral. 
CHARY Hsu.Ed, [id number] [facility] Time Calculation: 26 mins

## 2018-10-16 NOTE — PROGRESS NOTES
conducted a Follow up consultation and Spiritual Assessment for Min Hodge, who is a 80 y.o.,female. Patient was alert and spoke to me today. Family members at the bedside. Upbeat today. The  provided the following Interventions: 
Continued the relationship of care and support. Listened empathically. Offered prayer and assurance of continued prayer on patients behalf. Chart reviewed. The following outcomes were achieved: 
Patient expressed gratitude for Spiritual Care visit. Patient was reviewed in ICU Interdisciplinary Rounds. Assessment: 
There are no further spiritual or Quaker issues which require Spiritual Care Services intervention at this time. Plan: 
Chaplains will continue to follow and will provide pastoral care as needed or requested.  recommends bedside caregivers page the  on duty if patient shows signs of acute spiritual or emotional distress. [address], [place]. Board Certified [employer] [phone] - Office

## 2018-10-16 NOTE — DIABETES MGMT
GLYCEMIC CONTROL PROGRESS NOTE: 
 
-discussed in rounds, known h/o T2DM, HbA1c within recommended range for age + comorbids on oral home regimen 
-BG out of target range ICU: 140-180 mg/dL 
-POCT + - Humalog Normal Insulin Sensitivity Corrective Coverage orders in place recommend continue 
-pt may benefit from conservative dose of basal insulin Noted: 
-pt with mild hypoglycemic episode on POCT 10/15 possibly r/t NPO status, protocol followed Recent Glucose Results:  
Lab Results Component Value Date/Time  (H) 10/16/2018 05:04 AM  
 GLUCPOC 163 (H) 10/16/2018 07:27 AM  
 GLUCPOC 104 10/15/2018 10:00 PM  
 GLUCPOC 98 10/15/2018 07:44 PM  
 
Peg Neelima MS, RN, CDE Glycemic Control Team 
[phone] Pager [phone] (M-TH 8:00-4:30P) *After Hours pager [phone]

## 2018-10-16 NOTE — PROGRESS NOTES
1930- Report and care received, assessment completed per flow sheet. Alert, makes eye contact, nods head yes to questions, did not note a no response= gives nurse a confused look when unsure. Unable to determine orientation or if nodding appropriately. When asked if she could feel nurse touching extremities she would nod head yes whether she was being touched or not. Follows some commands, not all. Smiles at times, slight facial droop noted on right side, however, not easily seen. Aphasic with the exception of grunting noises in response to nurse, also said yes one time, however, this was not reproducible. Right arm/ and right leg significantly weaker than left arm and left leg. PERRL, received in report that right pupil was sluggish, however, both are equal and respond briskly to light. 2110- Reportedly has not voided since straight cath earlier in the day, bladder scan completed and = 672 ml. Placed on bedpan and requested she attempt to void. 2136- Unable to void,  notified, orders received to insert a zarate catheter. 0000- Reassessment (including neurological assessment) completed and without change. 0210-  notified of NIBP, 500 ml normal saline bolus ordered. 0300- Reassessment (including neurological assessment) completed and without change except when asked how she was doing, patient responded with a sentence (instead of a one word response).

## 2018-10-16 NOTE — PROGRESS NOTES
NEUROLOGY PROGRESS NOTE Patient: Abhilash Chau        Sex: female          DOA: 10/15/2018 YOB: 1932      Age:  80 y.o.         LOS: 1 day Identification: 
[id number] -American female presents with sudden onset of right facial weakness, right arm and leg weakness as well as aphasia. SUBJECTIVE:  
Patient is fully awake today, follow simple verbal commands. SHAWN has been resolved overnight after IV fluid. REVIEW OF SYSTEMS: Denies chest pain, abdominal pain, nausea or vomiting. No fever or chills. OBJECTIVE:  
  
Visit Vitals  /74  Pulse 98  Temp 98.2 °F (36.8 °C)  Resp 24  
 Ht 5' 2\" (1.575 m)  SpO2 96% Physical Exam: 
GEN: Alert, NAD 
EYES: conjunctiva normal, lids with out lesions HEENT: MMM. HEART: RRR +S1 +S2 LUNGS: CTA B/L no rales or rhonchi. ABDOMEN: Soft, non-tender. EXTREMITIES: No edema cyanosis SKIN: no rashes or skin breakdown, no nodules NEURO: awake,  follow simple verbal commands, expressive aphasia Cranial nerves: right facial weakness. Left gaze preference. a. Extraocular movements are intact with no nystagmus. Visual fields are full to confrontation. PERRL. Tongue is midline. Palate elevates symmetrically. Hearing intact to speech. Sternocleidomastoid and trapezius strengths are full bilaterally. Motor:4+/5 right upper ane lower limbs muscle tone is reduced on right upper and lower limbs, Normal tone and normal bulk on left upper and lower limbs. Sensory:grossly normal both sides. Coordination: deferred due to aphasia Deep tendon reflexes: 2+ at biceps, brachioradialis, patella and ankles bilaterally. Toes are  Mute. Gait assessment: deferred Current Facility-Administered Medications Medication Dose Route Frequency  insulin lispro (HUMALOG) injection   SubCUTAneous Q6H  
 sodium chloride (NS) flush 5-10 mL  5-10 mL IntraVENous PRN  
  sodium chloride (NS) flush 5-10 mL  5-10 mL IntraVENous Q8H  
 sodium chloride (NS) flush 5-10 mL  5-10 mL IntraVENous PRN  
 aspirin (ASA) suppository 300 mg  300 mg Rectal DAILY  labetalol (NORMODYNE;TRANDATE) 20 mg/4 mL (5 mg/mL) injection 5 mg  5 mg IntraVENous Q10MIN PRN  
 heparin (porcine) injection 5,000 Units  5,000 Units SubCUTAneous Q8H  
 glucose chewable tablet 16 g  16 g Oral PRN  
 glucagon (GLUCAGEN) injection 1 mg  1 mg IntraMUSCular PRN  
 dextrose (D50W) injection syrg 25 g  50 mL IntraVENous PRN  
 dextrose 5 % - 0.45% NaCl infusion  125 mL/hr IntraVENous CONTINUOUS Laboratory Recent Results (from the past 24 hour(s)) CULTURE, BLOOD Collection Time: 10/15/18 11:05 AM  
Result Value Ref Range Special Requests: NO SPECIAL REQUESTS Culture result: NO GROWTH AFTER 19 HOURS    
LACTIC ACID Collection Time: 10/15/18 11:05 AM  
Result Value Ref Range Lactic acid 1.2 0.4 - 2.0 MMOL/L  
CULTURE, BLOOD Collection Time: 10/15/18 11:47 AM  
Result Value Ref Range Special Requests:  LEFT AC   
 Culture result: NO GROWTH AFTER 19 HOURS    
URINALYSIS W/ RFLX MICROSCOPIC Collection Time: 10/15/18 11:55 AM  
Result Value Ref Range Color YELLOW Appearance CLEAR Specific gravity 1.013 1.005 - 1.030    
 pH (UA) 5.0 5.0 - 8.0 Protein NEGATIVE  NEG mg/dL Glucose NEGATIVE  NEG mg/dL Ketone NEGATIVE  NEG mg/dL Bilirubin NEGATIVE  NEG Blood NEGATIVE  NEG Urobilinogen 0.2 0.2 - 1.0 EU/dL Nitrites NEGATIVE  NEG Leukocyte Esterase NEGATIVE  NEG    
GLUCOSE, POC Collection Time: 10/15/18  4:23 PM  
Result Value Ref Range Glucose (POC) 62 (L) 70 - 110 mg/dL GLUCOSE, POC Collection Time: 10/15/18  4:59 PM  
Result Value Ref Range Glucose (POC) 161 (H) 70 - 110 mg/dL GLUCOSE, POC Collection Time: 10/15/18  7:44 PM  
Result Value Ref Range Glucose (POC) 98 70 - 110 mg/dL BUN  
 Collection Time: 10/15/18  7:49 PM  
Result Value Ref Range BUN 20 (H) 7.0 - 18 MG/DL  
CREATININE Collection Time: 10/15/18  7:49 PM  
Result Value Ref Range Creatinine 1.41 (H) 0.6 - 1.3 MG/DL  
 GFR est AA 43 (L) >60 ml/min/1.73m2 GFR est non-AA 35 (L) >60 ml/min/1.73m2 GLUCOSE, POC Collection Time: 10/15/18 10:00 PM  
Result Value Ref Range Glucose (POC) 104 70 - 110 mg/dL LIPID PANEL Collection Time: 10/16/18  5:04 AM  
Result Value Ref Range LIPID PROFILE Cholesterol, total 106 <200 MG/DL Triglyceride 117 <150 MG/DL  
 HDL Cholesterol 32 (L) 40 - 60 MG/DL  
 LDL, calculated 50.6 0 - 100 MG/DL VLDL, calculated 23.4 MG/DL  
 CHOL/HDL Ratio 3.3 0 - 5.0 METABOLIC PANEL, BASIC Collection Time: 10/16/18  5:04 AM  
Result Value Ref Range Sodium 143 136 - 145 mmol/L Potassium 4.1 3.5 - 5.5 mmol/L Chloride 108 100 - 108 mmol/L  
 CO2 28 21 - 32 mmol/L Anion gap 7 3.0 - 18 mmol/L Glucose 126 (H) 74 - 99 mg/dL BUN 16 7.0 - 18 MG/DL Creatinine 1.29 0.6 - 1.3 MG/DL  
 BUN/Creatinine ratio 12 12 - 20 GFR est AA 47 (L) >60 ml/min/1.73m2 GFR est non-AA 39 (L) >60 ml/min/1.73m2 Calcium 8.1 (L) 8.5 - 10.1 MG/DL  
CBC W/O DIFF Collection Time: 10/16/18  5:04 AM  
Result Value Ref Range WBC 6.7 4.6 - 13.2 K/uL  
 RBC 3.56 (L) 4.20 - 5.30 M/uL HGB 7.5 (L) 12.0 - 16.0 g/dL HCT 26.4 (L) 35.0 - 45.0 % MCV 74.2 74.0 - 97.0 FL  
 MCH 21.1 (L) 24.0 - 34.0 PG  
 MCHC 28.4 (L) 31.0 - 37.0 g/dL RDW 28.3 (H) 11.6 - 14.5 % PLATELET 119 893 - 239 K/uL MPV 9.0 (L) 9.2 - 11.8 FL  
HEMOGLOBIN A1C WITH EAG Collection Time: 10/16/18  5:04 AM  
Result Value Ref Range Hemoglobin A1c 5.8 (H) 4.5 - 5.6 % Est. average glucose 120 mg/dL GLUCOSE, POC Collection Time: 10/16/18  7:27 AM  
Result Value Ref Range Glucose (POC) 163 (H) 70 - 110 mg/dL Radiology: 
Ct Head Wo Cont Result Date: 10/15/2018 IMPRESSION: No acute intracranial abnormalities. ASSESSMENT/IMPRESSION:  
ASSESSMENT/IMPRESSION: 81 YO AAF presents with sudden onset of aphasia, dysarthria, right sided weakness,mer neglect and aphasia. CT head shows periventricular microvascular changes 1. Acute ischemic stroke 2. Encephalopathy: resolved, likely due to side effect of morphine. 3.Colon cancer status post surgery 4. SHAWN: resolved 
  
TPA was not given due to recent stroke, and GI surgery, she is also out of tpa window. MRI and CTA were not performed due to contraindication of staples from GI surgery and elevated creatine. She is out of 16 hours thrombectomy window for large vessel occlusion.  
  
Initial recommendations: 
Please use stroke admission order sets. 1. Aspirin 325 mg po 2. CTA head and neck 3. If patient has no decline of mental status today, it is ok to transfer her to telemetry floor. 4. Please consult general surgery or GI surgery to remove staple if possible, in order to perform MRI. 5. Please limit narcotics or benzo if possible. Please give NSAIDs for moderate pain. 6. Permissive hypertension (do not treat if BP is not >200/110, prefer prn labetolol 5mg) 7. IV normal saline continuous infusion 100-125 ml/h 
8. Euglycemia with sliding scale insulin 9. Euthermia with acetaminophen 650mg Q6h prn for fever. 10. Continue PT/OT/ST I will follow the patient. Please do not hesitate to return with any questions. Signed:  
Valentín Daniels MD 
10/16/2018 
10:03 AM

## 2018-10-16 NOTE — PROGRESS NOTES
0730- Bedside and Verbal shift change report given to Jaylen Romero RN (oncoming nurse) by Jose E Mendenhall rN(offgoing nurse). Report included the following information SBAR, Kardex, Intake/Output, MAR, Recent Results and Cardiac Rhythm SR.  
 
0800- Initial shift assessment complete. VS stable. Patient alert to self and place only. Will continue to monitor. 1200- Spoke with Dr Mich Rome, clarified that staples are titanium and MRI compatible. The staples are not able to come out at this time. Called MRI and they state MRI cannot be complete due to there being skin staples and there is risk of heating up and burning the skin. Reassessment complete, no changes. Patient continues to follow simple commands. Family remains at bedside. 1330- Patient eating, tolerating well. Call from CT, patient to be NPO for a couple of hours before CTA so after done with tray keep NPO. [address] [facility] with Dr Vicenta Paez, received orders to transfer to telemetry unit. 1620- TRANSFER - OUT REPORT: 
 
Verbal report given to [employer], RN(name) on Susannah Mcqueen  being transferred to (unit) for routine progression of care Report consisted of patients Situation, Background, Assessment and  
Recommendations(SBAR). Information from the following report(s) SBAR, Kardex, Intake/Output, MAR, Recent Results and Cardiac Rhythm SR/ST was reviewed with the receiving nurse. Lines:  
Peripheral IV 10/15/18 Right Antecubital (Active) Site Assessment Clean, dry, & intact 10/16/2018  3:00 AM  
Phlebitis Assessment 0 10/16/2018  3:00 AM  
Infiltration Assessment 0 10/16/2018  3:00 AM  
Dressing Status Clean, dry, & intact 10/16/2018  3:00 AM  
Dressing Type Transparent 10/16/2018  3:00 AM  
Hub Color/Line Status Capped 10/16/2018  3:00 AM  
Action Taken Open ports on tubing capped 10/15/2018  1:45 PM  
Alcohol Cap Used Yes 10/15/2018  6:37 PM  
   
Peripheral IV 10/15/18 Right Wrist (Active) Site Assessment Clean, dry, & intact 10/16/2018  3:00 AM  
Phlebitis Assessment 0 10/16/2018  3:00 AM  
Infiltration Assessment 0 10/16/2018  3:00 AM  
Dressing Status Clean, dry, & intact 10/16/2018  3:00 AM  
Dressing Type Transparent 10/16/2018  3:00 AM  
Hub Color/Line Status Infusing 10/16/2018  3:00 AM  
  
 
Opportunity for questions and clarification was provided. Patient transported with: 
 Monitor O2 @ 4 liters Registered Nurse

## 2018-10-17 PROBLEM — I82.432 DEEP VEIN THROMBOSIS (DVT) OF POPLITEAL VEIN OF LEFT LOWER EXTREMITY (HCC): Status: ACTIVE | Noted: 2018-01-01

## 2018-10-17 PROBLEM — I26.99 ACUTE PULMONARY EMBOLISM (HCC): Status: ACTIVE | Noted: 2018-01-01

## 2018-10-17 NOTE — PROGRESS NOTES
Hospitalist Progress Note Patient: Suki Berger MRN: [id number]  CSN: [id number] YOB: 1932  Age: 80 y.o. Sex: female DOA: 10/15/2018 LOS:  LOS: 2 days Chief Complaint: CVA Assessment/Plan 1. Acute Ischemic Stroke 2. Acute Pulmonary Embolism 3. Left Popliteal DVT 4. S/P Colectomy 5. Chronic Respiratory Failure 6. Anemia 1. Neurology on board and following. To have MRI brain today for definitive evaluation of CVA. Continue PT/OT. Continue Lipitor. Aspirin was discontinued today as the patient will be started on anticoagulation for issues enumerated below. 2. Received a call from Dr Mayra Mo today after CTA head/neck was read. Incidental finding of acute PE on imaging. Case discussed with neurology, pulm/cc, and colorectal surgery. Neurology states ok to start warfarin, but discontinue aspirin. Neurology prefers to not use heparin gtt due to risk of bleeding. Echo being obtained to rule out right heart strain from pulm/cc standpoint, also underwent venous duplex today which showed left popliteal DVT. 3. As above, going to be initiated on warfarin without a bridge. Will monitor. 4. Discussed with case with Dr Kary Lackey, who performed the colectomy on 10/8; who stated that from a surgical perspective the patient was safe for anticoagulation. 5. Continued supplemental oxygen use at 3.5L/min. 6. Stable, monitor closely as she will be initiated on anticoagulation therapy this evening. Will monitor closely for any acute neurological changes. In the event of an acute neurological change, will obtain CT head to evaluate. DVT Prophylaxis - Warfarin Disposition - TBD. 35 minutes spent with >50% of time counseling and explaining findings on imaging studies, complications, and treatment plan. Patient Active Problem List  
Diagnosis Code  Dyspnea R06.00  Troponin level elevated R74.8  Wheezing R06.2  Sarcoidosis D86.9  Diabetes ([facility] E11.9  Hypoxia R09.02  Stroke ([facility] I63.9  Syncope R55  Chronic respiratory failure with hypoxia, on home O2 therapy (HCC) J96.11, Z99.81  
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  
 Colon cancer ([facility] C18.9  Acute pulmonary embolism (HCC) I26.99  
 Deep vein thrombosis (DVT) of popliteal vein of left lower extremity (HCC) G06.553 Subjective: 
 
States she feels well, no concerns. Family concerned over current situation. Review of systems: 
 
Constitutional: denies fevers, chills, myalgias Respiratory: denies SOB, cough Cardiovascular: denies chest pain, palpitations Gastrointestinal: denies nausea, vomiting, diarrhea Vital signs/Intake and Output: 
Visit Vitals /63 Pulse 71 Temp 98.4 °F (36.9 °C) Resp 19 Ht 5' 2\" (1.575 m) Wt 95.3 kg (210 lb) SpO2 90% BMI 38.41 kg/m² Current Shift:  No intake/output data recorded. Last three shifts:  10/15 1901 - 10/17 0700 In: 240 [P.O.:240] Out: 3100 [Urine:3100] Exam: 
 
General: Elderly, chronically ill appearing AA female, alert, NAD, OX3 Head/Neck: NCAT, supple, No masses, No lymphadenopathy CVS:Regular rate and rhythm, no M/R/G, S1/S2 heard, no thrill Lungs:Clear to auscultation bilaterally, no wheezes, rhonchi, or rales Abdomen: Soft, Nontender, No distention, Normal Bowel sounds, No hepatomegaly Extremities: No C/C/E, pulses palpable 2+ Skin:normal texture and turgor, no rashes, no lesions Neuro:mild right sided weakness, otherwise no obvious deficits. Follows commands. Psych:appropriate Labs: Results:  
   
Chemistry Recent Labs 10/17/18 
0526 10/16/18 
0504 10/15/18 
1949 10/15/18 
0900 * 126*  --  119*  143  --  141  
K 4.2 4.1  --  4.7 * 108  --  104 CO2 27 28  --  29 BUN 11 16 20* 22* CREA 1.08 1.29 1.41* 1.51* CA 8.2* 8.1*  --  8.3* AGAP 7 7  --  8  
BUCR 10* 12  --  15  
AP  --   --   --  61  
TP  --   --   --  5.7* ALB  --   --   --  2.5*  
GLOB  --   --   --  3.2 AGRAT  --   --   --  0.8 CBC w/Diff Recent Labs 10/17/18 
0526 10/16/18 
0504 10/15/18 
0900 WBC 6.1 6.7 9.0  
RBC 3.51* 3.56* 4.08* HGB 7.4* 7.5* 8.7* HCT 26.0* 26.4* 29.5*  
 301 392 GRANS  --   --  75* LYMPH  --   --  15* EOS  --   --  1 Cardiac Enzymes No results for input(s): CPK, CKND1, YESENIA in the last 72 hours. No lab exists for component: Ricarda Lakia Coagulation No results for input(s): PTP, INR, APTT in the last 72 hours. No lab exists for component: INREXT, INREXT Lipid Panel Lab Results Component Value Date/Time Cholesterol, total 106 10/16/2018 05:04 AM  
 HDL Cholesterol 32 (L) 10/16/2018 05:04 AM  
 LDL, calculated 50.6 10/16/2018 05:04 AM  
 VLDL, calculated 23.4 10/16/2018 05:04 AM  
 Triglyceride 117 10/16/2018 05:04 AM  
 CHOL/HDL Ratio 3.3 10/16/2018 05:04 AM  
  
BNP No results for input(s): BNPP in the last 72 hours. Liver Enzymes Recent Labs 10/15/18 
0900 TP 5.7* ALB 2.5* AP 61 SGOT 26 Thyroid Studies No results found for: T4, T3U, TSH, TSHEXT, TSHEXT Procedures/imaging: see electronic medical records for all procedures/Xrays and details which were not copied into this note but were reviewed prior to creation of Plan Suleiman Lopez PA-C

## 2018-10-17 NOTE — PROGRESS NOTES
1930: Received report from [facility] FERNANDO-MIHAI. White board updated. Pt is alert and oriented to self, place and situation, but is unable to tell nurse what year or month it currently is. Pt NIH score is now a 10, and neuro checks to be done per unit protocol. Pt currently does not report any pain or respiratory distress. Family is at bedside. Pt's questions and concerns addressed at this time. Will continue to monitor. 2000: Pt MEWS score currently 3 due to increased heart rate all other vitals WNL, will continue to monitor MEWS scores. 0000: Reassessment completed, no changes compared to initial assessment, pt does not report any pain or discomfort at this time. Bed in lowest position, and call bell within reach, and family still at bedside. Will continue to monitor. 0400: Pt sleeping comfortably with family at bedside, will continue to round hourly on pt.

## 2018-10-17 NOTE — ROUTINE PROCESS
Shift summary: 
1150: Farhana Lane from vascular notified that pt has DVT in left popliteal rowena. Notified Otis Jarrett. Will continue to monitor. 1645: Removed pt zarate waiting for pt to void. Bedside and Verbal shift change report given to [facility] Jaun Shah (oncoming nurse) by Tania Hernandez RN (offgoing nurse). Report included the following information SBAR, Kardex, Procedure Summary, Intake/Output, MAR, Accordion, Recent Results and Med Rec Status.

## 2018-10-17 NOTE — PROGRESS NOTES
Pharmacy Dosing Services:   Warfarin Consult for Warfarin Dosing by Pharmacy by Dr. Florina Gold provided for this 80 y.o.  female , for indication of Acute Pulmonary Embolism and DVT. Day of Therapy 1 Dose to achieve an INR goal of 2-3 Order entered for  Warfarin  5 (mg) ordered to be given today at 18:00. Patient also on Heparin 5000 units sq q8hrs. LABS   
PT/INR Lab Results Component Value Date/Time INR 1.0 10/17/2018 02:30 PM  
  
Platelets Lab Results Component Value Date/Time PLATELET 592 93/66/4640 05:26 AM  
  
H/H Lab Results Component Value Date/Time HGB 7.4 (L) 10/17/2018 05:26 AM  
  
 
Warfarin Administrations (last 168 hours) None Pharmacy to follow daily and will provide subsequent Warfarin dosing based on clinical status. Jay Milan [facility]     Contact information   [phone]

## 2018-10-17 NOTE — PROGRESS NOTES
[facility]ulmonary Specialists Pulmonary, Critical Care, and Sleep Medicine Name: Annelise Bethea MRN: [id number] : 1932 Hospital: [facility] [place] Date: 10/17/2018 Pulmonary Patient F/Up IMPRESSION:  
Principal Problem: 
  Stroke ([address] (2018) · PE R>L, incidental finding on CTA head neck · Left leg DVT · Improved encephalopathy, suspected from medications · SHAWN improved · Anemia, relatively stable Hgb from baseline · Colon cancer with recent colectomy sx 10/8/18 RECOMMENDATIONS:  
Compensated respiratory status. Supplemental O2 via NC, wean flow for goal SPO2> 90% Aspiration prevention bundle, head of the bed at 30' all times, pulmonary hygiene care Check PT, INR today Start anticoagulation with Coumadin based on INR ordered and then monitor INR daily Discussed extensively with the patient and family [granddaughters with patient's permission] regarding indications for treatment given adenocarcinoma of colon, limited mobility with risk of progression, pulmonary HTN, options of treatment, side effects of anticoagulation treatment including bleeding, medications-food interactions, duration of anti-coagulation, availability of any reversal agents and more Answered all questions to their satisfaction Await ECHO Monitor CBC, any signs of bleeding/hematoma per protocol Patient advised to monitor for any signs of bleeding- she verbalized understanding Follow up MRI result per neurology Stop ASA once on Coumadin Stress ulcer prophylaxis DVT prophylaxis Heparin, stop Heparin once INR therapeutic Diet as tolerated Palliative care consulted Will defer respective systems problem management to primary and other consultant and follow patient with primary and other medical team. Staff to check with Neurology after CTA head for disposition Further recommendations will be based on the patient's response to recommended treatment and results of the investigation ordered. ADVANCE DIRECTIVE: Full code FAMILY DISCUSSION: Spoke with patient and with her permission with family Subjective/History: Ms. Maren Brink has been seen and evaluated as Dr. Analisa Simpson requested now for assisting in ICU management. Patient is a 80 y.o. female with PMHx for sarcoidosis, chronic respiratory failure, colon cancer s/p colectomy 10/8/18, DM, HTN, recent CVA was brought to ER from [facility] with aphasia. In ER, CT head nil acute and advanced chronic microvascular changes mainly in the deep white matter. MRI could not be performed 2' to recent surgery staples. Patient was tx to ICU after noted to be somnolent. Patient at the time of this interview awake, alert and follows all commands. The patient reports having chronic cough, dyspnea, denies fever, chills, chest pain, wheezing or hemoptysis.   
 
10/17/18 Patient transferred to tele floor and had CTA head neck which I found result of this late morning Patient denies change in chronic cough and dyspnea, denies chest pain, wheezing or hemoptysis. Has O2 at home at 2 Lpm and intermittent use. No change in flow during hospitalization Hemodynamically stable, Hgb stable PVL showed acute left leg DVT; denies any leg pain or calf pain Updated patient and family regarding findings and need for anticoagulation while challenges with CVA Patient and family prefers to anticoagulation understanding risk of bleeding Per discussion with neurology and surgery okay to anticoagulate with warfarin but no Heparin drip Review of Systems: 
Pertinent items are noted in HPI. Latest lactic acid:  
Lactic acid Date Value Ref Range Status 10/15/2018 1.2 0.4 - 2.0 MMOL/L Final  
 
 
Past Medical History: 
Past Medical History:  
Diagnosis Date  Acid reflux  Arthritis  Atrial fibrillation [address] [address]  Autoimmune disease ([facility] [address] [facility] Sarcoidosis  Colon cancer ([facility] [address] [facility]  Diabetes ([facility] [address] [facility]  High cholesterol  Hypertension  Sarcoidosis of lung [address] [address]  Stroke ([facility] [address] Past Surgical History: 
Past Surgical History:  
Procedure Laterality Date  HX CHOLECYSTECTOMY  HX COLECTOMY Medications: 
Prior to Admission medications Medication Sig Start Date End Date Taking? Authorizing Provider  
escitalopram oxalate (LEXAPRO) 10 mg tablet Take 10 mg by mouth daily. Yes Provider, Historical  
lisinopril-hydroCHLOROthiazide (ZESTORETIC) 20-12.5 mg per tablet Take 1 Tab by mouth daily. Yes Provider, Historical  
clopidogrel (PLAVIX) 75 mg tab Take 75 mg by mouth daily. Yes Provider, Historical  
aspirin (ASPIRIN) 325 mg tablet Take 1 Tab by mouth daily. 10/13/18  Yes Kam Golden MD  
atorvastatin (LIPITOR) 80 mg tablet Take 1 Tab by mouth daily. 10/13/18  Yes Kam Golden MD  
fluticasone-vilanterol (BREO ELLIPTA) 100-25 mcg/dose inhaler Take 1 Puff by inhalation daily. 10/13/18  Yes Kam Golden MD  
metoprolol tartrate (LOPRESSOR) 25 mg tablet Take 0.5 Tabs by mouth every twelve (12) hours. 10/12/18  Yes Kam Golden MD  
polyethylene glycol [facility]) 17 gram packet Take 1 Packet by mouth daily. 10/12/18  Yes Kam Golden MD  
montelukast (SINGULAIR) 10 mg tablet Take 10 mg by mouth nightly. Yes Provider, Historical  
ranitidine (ZANTAC) 150 mg tablet Take 150 mg by mouth two (2) times a day. Yes Provider, Historical  
furosemide (LASIX) 20 mg tablet Take  by mouth daily. Yes Provider, Historical  
glipiZIDE (GLUCOTROL) 5 mg tablet Take 5 mg by mouth two (2) times a day. Yes Provider, Historical  
metFORMIN (GLUCOPHAGE) 500 mg tablet Take 500 mg by mouth two (2) times daily (with meals). Yes Provider, Historical  
pantoprazole (PROTONIX) 40 mg tablet Take 40 mg by mouth daily. Yes Other, MD Misael  
mometasone (NASONEX) 50 mcg/actuation nasal spray 2 Sprays daily.     Provider, Historical  
albuterol (PROVENTIL HFA, VENTOLIN HFA, PROAIR HFA) 90 mcg/actuation inhaler Take 1-2 Puffs by inhalation every six (6) hours as needed for Wheezing. 8/10/16   Cliff Inman MD  
 
 
Current Facility-Administered Medications Medication Dose Route Frequency  famotidine (PF) (PEPCID) 20 mg in sodium chloride 0.9% 10 mL injection  20 mg IntraVENous DAILY  0.9% sodium chloride infusion  100 mL/hr IntraVENous CONTINUOUS  
 insulin lispro (HUMALOG) injection   SubCUTAneous AC&HS  sodium chloride (NS) flush 5-10 mL  5-10 mL IntraVENous Q8H  
 heparin (porcine) injection 5,000 Units  5,000 Units SubCUTAneous Q8H Allergy: No Known Allergies Social History: 
Social History Tobacco Use  Smoking status: Former Smoker Packs/day: 2.50 Years: 30.00 Pack years: 75.00 Types: Cigarettes  Smokeless tobacco: Never Used Substance Use Topics  Alcohol use: No  
 Drug use: No  
  
 
Family History: 
History reviewed. No family history for emphysema. Objective: 
Vital Signs:   
Blood pressure 122/63, pulse 71, temperature 98.4 °F (36.9 °C), resp. rate 19, height 5' 2\" (1.575 m), weight 95.3 kg (210 lb), SpO2 90 %. Body mass index is 38.41 kg/m². O2 Device: Nasal cannula O2 Flow Rate (L/min): 3.5 l/min Temp (24hrs), Av °F (36.7 °C), Min:97.3 °F (36.3 °C), Max:98.7 °F (37.1 °C) Intake/Output:  
Last shift:      No intake/output data recorded. Last 3 shifts: 10/15 1901 - 10/17 0700 In: 240 [P.O.:240] Out: 3100 [Urine:3100] Intake/Output Summary (Last 24 hours) at 10/17/2018 1402 Last data filed at 10/17/2018 7083 Gross per 24 hour Intake 240 ml Output 1600 ml Net -1360 ml Physical Exam: 
General: awake, alert, follows commands, in no respiratory distress, cooperative, appears stated age HEENT: PERRL, fundi benign, throat normal without erythema or exudate Neck: No abnormally enlarged lymph nodes or thyroid, supple Chest: normal 
Lungs: moderate air entry, clear to auscultation bilaterally, normal percussion bilaterally, no tenderness/ rash Heart: Regular rate and rhythm, S1S2 present or without murmur or extra heart sounds Abdomen: protuberant, bowel sounds normoactive, tympanic, abdomen is soft without significant tenderness, masses, organomegaly or guarding, rigidity, rebound Extremity: negative for edema, cyanosis, clubbing Neuro: awake, alert, following commands, speech slurred intermittently Skin: Skin color, texture, turgor fair. Skin dry, warm, non-diaphoretic Data:  
 
Recent Results (from the past 24 hour(s)) GLUCOSE, POC Collection Time: 10/16/18  4:19 PM  
Result Value Ref Range Glucose (POC) 124 (H) 70 - 110 mg/dL GLUCOSE, POC Collection Time: 10/16/18  9:44 PM  
Result Value Ref Range Glucose (POC) 131 (H) 70 - 110 mg/dL METABOLIC PANEL, BASIC Collection Time: 10/17/18  5:26 AM  
Result Value Ref Range Sodium 145 136 - 145 mmol/L Potassium 4.2 3.5 - 5.5 mmol/L Chloride 111 (H) 100 - 108 mmol/L  
 CO2 27 21 - 32 mmol/L Anion gap 7 3.0 - 18 mmol/L Glucose 118 (H) 74 - 99 mg/dL BUN 11 7.0 - 18 MG/DL Creatinine 1.08 0.6 - 1.3 MG/DL  
 BUN/Creatinine ratio 10 (L) 12 - 20 GFR est AA 58 (L) >60 ml/min/1.73m2 GFR est non-AA 48 (L) >60 ml/min/1.73m2 Calcium 8.2 (L) 8.5 - 10.1 MG/DL  
CBC W/O DIFF Collection Time: 10/17/18  5:26 AM  
Result Value Ref Range WBC 6.1 4.6 - 13.2 K/uL  
 RBC 3.51 (L) 4.20 - 5.30 M/uL HGB 7.4 (L) 12.0 - 16.0 g/dL HCT 26.0 (L) 35.0 - 45.0 % MCV 74.1 74.0 - 97.0 FL  
 MCH 21.1 (L) 24.0 - 34.0 PG  
 MCHC 28.5 (L) 31.0 - 37.0 g/dL RDW 28.8 (H) 11.6 - 14.5 % PLATELET 571 575 - 220 K/uL MPV 9.3 9.2 - 11.8 FL  
GLUCOSE, POC Collection Time: 10/17/18  6:17 AM  
Result Value Ref Range Glucose (POC) 121 (H) 70 - 110 mg/dL ECHO ADULT COMPLETE Collection Time: 10/17/18 12:14 PM  
Result Value Ref Range LA Volume 39.88 22 - 52 mL Right Atrial Area 4C 11.39 cm2 Ao Root D 3.55 cm AO ASC D 3.41 cm  
 AO ARCH D 3.62 cm Aortic Valve Systolic Peak Velocity 098.26 cm/s AoV VTI 32.71 cm Aortic Valve Area by Continuity of Peak Velocity 1.9 cm2 Aortic Valve Area by Continuity of VTI 2.0 cm2 AoV PG 17.1 mmHg LVIDd 4.13 3.9 - 5.3 cm  
 LVPWd 1.04 (A) 0.6 - 0.9 cm LVIDs 2.68 cm IVSd 1.04 (A) 0.6 - 0.9 cm  
 LV ED Vol A2C 44.0 mL  
 LV ES Vol A4C 33.7 mL  
 LV ES Vol BP 24.6 19 - 49 mL  
 LVOT d 2.02 cm  
 LVOT Peak Velocity 124.66 cm/s LVOT Peak Gradient 6.2 mmHg LVOT VTI 20.53 cm  
 LV E' Septal Velocity 0.70 cm/s LV E' Lateral Velocity 1.00 cm/s  
 MVA (PHT) 2.5 cm2  
 MV A Moris 99.32 cm/s  
 MV E Moris 0.64 cm/s  
 MV E/A 0.60 RVIDd 4.28 cm Aortic Valve Systolic Mean Gradient 8.7 mmHg BP EF 60.0 55 - 100 % LV Ejection Fraction MOD 4C 57 % LV Ejection Fraction MOD 2C 68 % LA Vol 4C 49.66 22 - 52 mL  
 LA Vol 2C 27.87 22 - 52 mL  
 LV Mass .5 (A) 67 - 162 g  
 LV Mass AL Index 69.3 43 - 95 g/m2 E/E' lateral 0.64   
 E/E' septal 0.91 IVC proximal 2.80 cm  
 E/E' ratio (averaged) 0.78 LV ES Vol A2C 14.1 mL  
 LVES Vol Index BP 12.6 mL/m2 LV ED Vol A4C 78.5 mL  
 LVED Vol Index BP 31.5 mL/m2 Mitral Valve E Wave Deceleration Time 302.4 ms Mitral Valve Pressure Half-time 87.7 ms Left Atrium Major Axis 3.67 cm Tapse 2.20 1.5 - 2.0 cm Triscuspid Valve Regurgitation Peak Gradient 59.3 mmHg LV ED Vol BP 61.5 56 - 104 ml  
 TR Max Velocity 384.89 cm/s  
 LA Vol Index 20.43 16 - 28 ml/m2 PASP 64.00 mmHg LA Vol Index 14.28 16 - 28 ml/m2 LA Vol Index 25.45 16 - 28 ml/m2 LVED Vol Index A4C 40.2 mL/m2 LVED Vol Index A2C 22.5 mL/m2 LVES Vol Index A4C 17.3 mL/m2 LVES Vol Index A2C 7.2 mL/m2 STANISLAW/BSA Pk Moris 0.9 cm2/m2 STANISLAW/BSA VTI 1.0 cm2/m2 GLUCOSE, POC Collection Time: 10/17/18  1:12 PM  
Result Value Ref Range Glucose (POC) 150 (H) 70 - 110 mg/dL No results for input(s): FIO2I, IFO2, HCO3I, IHCO3, HCOPOC, PCO2I, PCOPOC, IPHI, PHI, PHPOC, PO2I, PO2POC in the last 72 hours. No lab exists for component: IPOC2 All Micro Results Procedure Component Value Units Date/Time CULTURE, BLOOD [[id number]] Collected:  10/15/18 1105 Order Status:  Completed Specimen:  Blood Updated:  10/17/18 0410 Special Requests: NO SPECIAL REQUESTS Culture result: NO GROWTH 2 DAYS     
 CULTURE, BLOOD [[id number]] Collected:  10/15/18 1147 Order Status:  Completed Specimen:  Blood Updated:  10/17/18 0410 Special Requests:  LEFT AC  
  Culture result: NO GROWTH 2 DAYS Telemetry: normal sinus rhythm 9/30/18 ECHO · Estimated left ventricular ejection fraction is 66 - 70%. Left ventricular mild concentric hypertrophy. Normal left ventricular wall motion, no regional wall motion abnormality noted. Mild (grade 1) left ventricular diastolic dysfunction E/E' ratio is 12. Destini Carson · Mitral valve non-specific thickening. Mild mitral valve regurgitation. · Mild tricuspid valve regurgitation is present. Pulmonary arterial systolic pressure is 35 mmHg. Mild pulmonary hypertension is present. · Mild pulmonic valve regurgitation is present. · Saline contrast was given to evaluate for intracardiac shunt. Right to left shunt seen at rest. 
 
Imaging: 
[x]I have personally reviewed the patients chest radiographs images and report Results from Hospital Encounter encounter on 10/15/18 XR CHEST PORT Narrative A portable AP radiograph of the chest was obtained at 0930 hours: 
INDICATION:  Meets SIRS criteria. COMPARISON:  Multiple prior studies most recent being 10/10/2018. FINDINGS:  
  
Heart and mediastinum: Unremarkable. Lungs and pleura: Lungs are hypoinflated. No definite consolidation or pleural 
effusion is seen. Aorta: Unremarkable. Bones: Within normal limits for age. Other: None. Impression Impression: Hypoinflation. PA and lateral views suggested when feasible to exclude any 
bibasilar atelectasis or pleural effusion. Results from Hospital Encounter encounter on 10/15/18 CTA HEAD NECK W CONT Narrative EXAM: CTA HEAD AND NECK INDICATION: Aphasia, altered mental status COMPARISON: CTA head and neck 9/30/2018 TECHNIQUE:  Multiple axial CT images of the neck were obtained extending from 
the level of the aortic arch to the skull base after the administration of the 
IV contrast utilizing a CTA protocol. Maximum intensity projection 
reconstructions were performed in multiple planes. Multiple axial CT images of the head were obtained extending from below the 
level of the skull base to the vertex after the administration of the IV 
contrast utilizing a CTA protocol. Maximum intensity projection reconstructions 
were performed in three planes. Additional 3 D reconstructions were performed 
at a separate workstation. One or more dose reduction techniques were used on this CT: automated exposure 
control, adjustment of the mAs and/or kVp according to patient's size, and 
iterative reconstruction techniques. The specific techniques utilized on this CT 
exam have been documented in the patient's electronic medical record. 
 
___________________ FINDINGS: 
 
CTA NECK The origins of the great vessels along the aortic arch are unremarkable. Visualized thoracic aorta is unremarkable. Bilateral carotid arteries have 
normal variant retropharyngeal courses. The left common carotid is unremarkable. The left carotid bifurcation is 
slightly irregular. Estimated minimal luminal diameter proximal left ICA 5.1 mm. Estimated luminal diameter of normal left ICA cervical segment is 5.1 mm. Estimated degree of stenosis of the left ICA based upon NASCET criteria is 0%. Normal variant tortuosity and vascular loop is present involving the cervical 
segment. The right common carotid is unremarkable. The right carotid bifurcation is 
slightly irregular. Estimated minimal luminal diameter proximal right ICA 5.0 
mm. Estimated luminal diameter of normal right ICA cervical segment is 4.9 mm. Estimated degree of stenosis of the right ICA based upon NASCET criteria is 0%. Normal variant tortuosity and vascular loop is present involving the cervical 
segment. Vertebral arteries are fairly equal in size, left slightly larger than right. Tortuosity is present involving both vertebral arteries particularly along the 
V1 segments. No focal vertebral artery stenosis is seen. Incidentally included pulmonary arteries within the field-of-view demonstrate 
abnormal dilatation of the main pulmonary artery, approximately 3.9 cm at the 
level of mid descending arch suggesting pulmonary arterial hypertension. Filling 
defects are seen within the right pulmonary arteries, including within the right 
upper lobe pulmonary artery, and extending into the upper lobe anterior, apical, 
posterior segmental arteries. Small filling defect is seen in the left upper 
lobe anterior subsegmental pulmonary artery. Small bilateral pleural effusions are suggested. Airspace disease is present in 
both lung bases, partially visualized. Changes of paraseptal emphysema are 
present at the lung apices. 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 Partially 
visualized mildly enlarged paratracheal nodes are present. Degenerative changes are seen in the spine. CTA HEAD There is no evidence of aneurysm. There is no focal high-grade stenosis within 
the intracranial arteries. Very mild irregularity is seen in the bilateral 
carotid siphons without high grade stenosis. The carotid terminus is 
unremarkable. No focal NICK stenosis is seen. An anterior communicating artery is present. The 
middle cerebral artery bifurcations are unremarkable. Previously suggested stenosis involving distal right MCA M3 segments along the sylvian fissure is no 
longer seen. There is suggestion of narrowing of distal left MCA and 3 segments 
on the sylvian fissure such as sagittal MIP image 37 and 3-D reconstruction 
images. Intracranially left vertebral artery slightly larger than right. PICA origins 
are within normal limits located near the dural penetration. No focal basilar 
artery stenosis is seen. Brief fenestration suggested along the proximal basilar 
artery. Left P1 segment is hypoplastic. Posterior cerebral arteries are 
otherwise unremarkable. A posterior communicating artery is present on the left 
representing fetal origin type PCA. The dural venous sinuses are patent.  
 
___________________ Impression IMPRESSION: 
 
1. No definite large vessel intracranial occlusion. Moderate stenosis suggested 
involving distal left MCA branches. 2.  No hemodynamically significant ICA stenosis, based on NASCET criteria. 3. No high-grade vertebral artery stenosis. 4. Incidentally seen acute pulmonary emboli greater on the right. Dilated main 
pulmonary artery compatible with pulmonary arterial hypertension. No large 
central saddle embolus is seen; heart incompletely visualized to evaluate for 
potential RV strain. 5. Additional partially visualized bibasilar airspace disease, nonspecific 
perhaps atelectasis, pneumonia, or aspiration. Partially visualized small 
bilateral pleural effusions. CRITICAL RESULT:   
These unexpected critical results of impression #4 and acute pulmonary emboli 
were directly communicated to Dr. Delta Simmons at 9:15 AM on 10:17 AM. Results understood and acknowledged. [x]See my orders for details My assessment, plan of care, findings, medications, side effects etc were discussed with: 
[x]nursing [x]Armando Aguila  
[]respiratory therapy [x]Dr. Caryl Mckeon  
[x]family [x]Patient [x]High complexity decision making performed and > 50% time spent in face to face evaluation.  
 
Leno Flynn MD

## 2018-10-17 NOTE — PROGRESS NOTES
Speech Therapy Note: 
 
Could not progress with ST treatment because patient :  
 
[ ] was unresponsive [X] deferred due to: RN at bedside removing zarate [ ] was off the unit [ ] on hold 
[ ] NPO for procedure SLP will re-attempt treatment later this day or as schedule permits. Farshad Gutiérrez M.S., CF-SLP Speech Language Pathologist

## 2018-10-17 NOTE — ROUTINE PROCESS
0730: Bedside and Verbal shift change report given to Joy Basilio  (oncoming nurse) by Sherri Canas RN 
 (offgoing nurse).  Report included the following information SBAR, Kardex, Intake/Output, MAR, Accordion, Recent Results and Cardiac Rhythm ST. '

## 2018-10-17 NOTE — PROGRESS NOTES
PM team members Christopher DE LA CRUZ and myself met very briefly with Mrs. Maria Isabel Caceres. She was reclining in bed when we entered the room. Three of her children were sitting at bedside. Son, stated he was local and the two daughters stated they had just come down from [place]. Mrs Maria Isabel Caceres denied any complaints or needs. She said she was \"being well cared for\". She was not able to state why she was in the hospital \"I have a lot of stuff going on\". She was able to state that she had \"a tumor removed\". Her daughter stated that her mother had the tumor removed last admission and had gone to [place] for rehab but had a stroke the same day of discharge. Mrs. Maria Isabel Caceres stated \"oh yeah, I had a stroke\". Daughter said Volodymyr Schneider remembers, she just doesn't like talking about it\". Patient shared that she had seven children but lived with her daughters Cam Eddy and [facility]. Dhara Loop mind all of my business\". Unable to explore this further as transport arrived to take patient to vascular. Mrs. Tamela Jain children seemed to think that she was being transported back to ICU. We did ask nurse, Stephen to clarify with family. We will return at another time as patient seemed slightly confused and neither MPOA was present.  
 
Brandon Garcia RN

## 2018-10-17 NOTE — PROGRESS NOTES
Problem: Falls - Risk of 
Goal: *Absence of Falls Document Lucy Zhao Fall Risk and appropriate interventions in the flowsheet. Outcome: Progressing Towards Goal 
Fall Risk Interventions: 
  
 
  
 
Medication Interventions: Bed/chair exit alarm, Patient to call before getting OOB, Teach patient to arise slowly Elimination Interventions: Bed/chair exit alarm, Call light in reach, Patient to call for help with toileting needs, Toilet paper/wipes in reach, Toileting schedule/hourly rounds

## 2018-10-17 NOTE — PROGRESS NOTES
Problem: Pressure Injury - Risk of 
Goal: *Prevention of pressure injury Document Tavares Scale and appropriate interventions in the flowsheet. Outcome: Progressing Towards Goal 
Pressure Injury Interventions: 
Sensory Interventions: Assess changes in LOC, Avoid rigorous massage over bony prominences, Check visual cues for pain, Float heels, Keep linens dry and wrinkle-free, Minimize linen layers, Pressure redistribution bed/mattress (bed type) Moisture Interventions: Absorbent underpads, Check for incontinence Q2 hours and as needed, Minimize layers, Offer toileting Q_hr, Internal/External urinary devices Activity Interventions: Increase time out of bed, Pressure redistribution bed/mattress(bed type) Mobility Interventions: HOB 30 degrees or less, Pressure redistribution bed/mattress (bed type), Turn and reposition approx. every two hours(pillow and wedges) Nutrition Interventions: Document food/fluid/supplement intake, Offer support with meals,snacks and hydration Friction and Shear Interventions: Apply protective barrier, creams and emollients, HOB 30 degrees or less, Lift sheet, Minimize layers

## 2018-10-17 NOTE — PROGRESS NOTES
Case discussed with Dr. Júnior Peterson about anticoagulant, no heparin gtt due to recent stroke, will have Mri brain, ok to have warfarin. hold aspirin while on  Warfarin

## 2018-10-17 NOTE — PROGRESS NOTES
MRI brain was done and showed that there are several small foci of acute infarct involving the left frontal cortex, left insular cortex, left occipital lobe, and right cerebellar hemisphere. Probable additional small subacute punctate left frontal cortical infarcts.  No evidence of associated hemorrhage or mass effect. Given distribution, suspect central embolic origin. Clinical correlation is recommended. Since there is no massive cortical and subcortical stroke but punctuated embolic stroke,  the risk of hemorrhagic conversion will be there but much smaller, it is ok to do heparin drip bridging if necessary per primary team. Please repeat CT head if there is neurologic changes.

## 2018-10-17 NOTE — PROGRESS NOTES
Chart reviewed. Pt admitted for CVA. Went to bedside and pt off unit for MRI. Pts daughter and granddaughter at bedside. Informed them of role. Will follow up once pt back on unit. Per previous cm notes, referral has been placed for R[facility], no acceptance yet. PT is recommending acute rehab. CM will cont to follow.

## 2018-10-17 NOTE — PROGRESS NOTES
Problem: Mobility Impaired (Adult and Pediatric) Goal: *Acute Goals and Plan of Care (Insert Text) Physical Therapy Goals Initiated 10/16/2018 and to be accomplished within 5-7 day(s) 1. Patient will move from supine <> sit with S in prep for out of bed activity and change of position. 2.  Patient will perform sit<> stand with S with LRAD in prep for transfers/ambulation. 3.  Patient will transfer from bed <> chair with S with LRAD for time up in chair for completion of ADL activity. 4.  Patient will ambulate 150 feet with LRAD/S for improved functional mobility/safe discharge. Outcome: Not Progressing Towards Goal 
PT session held due to: 
[]  Nausea/vomiting 
[x]  Pulmonologist  Communication/ suggestion 
[]  Extreme Pain 
[]  Dialysis treatment in progress. Will f/u tomorrow, or when cleared to resume. Thank you.  
Airam Jones, PTA

## 2018-10-17 NOTE — PROGRESS NOTES
Case discussed with dr. Kell Valente she is ok to start heparin gtt . Will d/c warfarin now, start heparin gtt for pe and dvt treatment.

## 2018-10-18 NOTE — PROGRESS NOTES
Palliative medicine team follow up visit as family had questions regarding code status. Shirin Delaney MSW and myself spoke to Mrs. Lexy Miles with several of her family members present. Daughters, Keena Conley, Crystal Rodriguez and granddaughter Nellie Zamora were at bedside. They had questions regarding DNR and Advance Directives. They shared that they were under the impression that the Advance Directive had also been a DNR. Patient was alert and oriented to person,placeand time but when asked why she was in the hospital would only say \"because I am sick, I have a tumor\". She remembered completing new AMD last night and confirmed  that she wanted Sunita Alstrom as her MPOA. We explained the purpose of Advance Directives and also discussed DNR and DDNR. Mrs. Lexy Miles stated understanding of CPR, Intubation and agressive care at the end of life or in a comatose state. and said \"I don't want any of that\". She was very firm in her wish for DNR, no CPR and to be allowed to pass naturally. She stated understanding of the need for a DDNR outside of the hospital and immediately wanted to sign a DDNR stating \"I am tired of talking about this over and over. I thought this was what I was signing last night\". Family was in agreement with her wishes and encouraged her to sign the DDNR. Discussed with Dr. Gwen Mehta and [facility] signed by patient. Daughters requested copies of DDNR for themselves \"we want to honor her wishes\". Copy placed in paper chart to be scanned into EMR. Copies provided to daughters as well. PM team remains available to patient and family for support and information.  
 
Damion Pain EDWINA

## 2018-10-18 NOTE — PROGRESS NOTES
Hospitalist Progress Note Patient: Khurram Rivas MRN: [id number]  CSN: [id number] YOB: 1932  Age: 80 y.o. Sex: female DOA: 10/15/2018 LOS:  LOS: 3 days Chief Complaint: CVA Assessment/Plan 1. Acute Ischemic Stroke 2. Acute Pulmonary Embolism 3. Left Popliteal DVT 4. S/P Colectomy 5. Chronic Respiratory Failure 6. Anemia 1. Neurology continues to follow the patient. MRI brain showed multiple areas of small infarct bilaterally. Patient was placed on heparin drip yesterday afternoon for treatment of PE/DVT after review of MRI by neurologist. Patient began with PT today. After a PT session, the patient became lethargic, slurring her speech, and having delayed responses to painful stimuli. A stat CT head was obtained, which was negative for any acute intracranial abnormality. Neurology evaluated the patient after event with PT. Recommending EEG. EEG currently underway. Will await results of this. Heparin drip has been restarted after CT head showed no acute issues. 2. As above. Continue heparin gtt. Will discuss further anticoagulation options once patient stabilizes. 3. Heparin gtt. Will discuss further anticoagulation options once patient stabilizes. 4. Continue pain control with NSAIDs and Tylenol. Avoid narcotics and benzos. 5. Patient remains on her usual supply of supplemental oxygen. Continue to monitor. 6. H/H stable this AM. No obvious signs of bleeding. Monitor closely while on heparin gtt. Palliative care has met with the patient and family. Family discussion held by Dr Jill Ramos today. Patient is now a DNR. Patient will continue to benefit from PT/OT while hospitalized. Disposition at this point is to be determined. Home with hospice was discussed but no formal decision at this time. Disposition - To be determined. Patient Active Problem List  
Diagnosis Code  Dyspnea R06.00  Troponin level elevated R74.8  Wheezing R06.2  Sarcoidosis D86.9  Diabetes ([facility] E11.9  Hypoxia R09.02  Stroke ([facility] I63.9  Syncope R55  Chronic respiratory failure with hypoxia, on home O2 therapy (HCC) J96.11, Z99.81  
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  
 Colon cancer ([facility] C18.9  Acute pulmonary embolism (HCC) I26.99  
 Deep vein thrombosis (DVT) of popliteal vein of left lower extremity (HCC) V09.405 Subjective: 
 
Felt well prior to event with PT. Sleepy this afternoon. Review of systems: 
 
Constitutional: denies fevers, chills, myalgias Respiratory: denies SOB, cough Cardiovascular: denies chest pain, palpitations Gastrointestinal: denies nausea, vomiting, diarrhea Vital signs/Intake and Output: 
Visit Vitals BP (!) 132/111 (BP 1 Location: Right arm, BP Patient Position: At rest) Pulse 94 Temp 98.6 °F (37 °C) Resp 18 Ht 5' 2\" (1.575 m) Wt 92.6 kg (204 lb 3.2 oz) SpO2 100% BMI 37.35 kg/m² Current Shift:  No intake/output data recorded. Last three shifts:  10/16 1901 - 10/18 0700 In: 0 Out: 1700 [Urine:1700] Exam: 
 
General: Elderly AA female, alert, NAD, OX3 Head/Neck: NCAT, supple, No masses, No lymphadenopathy CVS:Regular rate and rhythm, no M/R/G, S1/S2 heard, no thrill Lungs:Clear to auscultation bilaterally, no wheezes, rhonchi, or rales Abdomen: Soft, Nontender, No distention, Normal Bowel sounds, No hepatomegaly Extremities: No C/C/E, pulses palpable 2+ Skin:normal texture and turgor, no rashes, no lesions Neuro:Right-sided weakness, otherwise grossly normal, follows commands Psych:appropriate Labs: Results:  
   
Chemistry Recent Labs 10/18/18 
0315 10/17/18 
5647 10/16/18 
2787 * 118* 126*  145 143  
K 4.2 4.2 4.1 * 111* 108 CO2 26 27 28 BUN 10 11 16 CREA 1.19 1.08 1.29  
CA 8.3* 8.2* 8.1*  
 AGAP 8 7 7 BUCR 8* 10* 12 CBC w/Diff Recent Labs 10/18/18 
0315 10/17/18 2015 10/17/18 
0835 WBC 6.3 6.5 6.1  
RBC 3.51* 3.54* 3.51* HGB 7.4* 7.4* 7.4* HCT 26.0* 26.0* 26.0*  
 323 325 GRANS 76* 70  --   
LYMPH 15* 18*  --   
EOS 2 2  --   
  
Cardiac Enzymes No results for input(s): CPK, CKND1, YESENIA in the last 72 hours. No lab exists for component: Salvatoreaileen Bergman Coagulation Recent Labs 10/18/18 
0315 10/17/18 2015 10/17/18 
1430 PTP 14.7  --  13.0 INR 1.2  --  1.0 APTT >180.0* 35.4  -- Lipid Panel Lab Results Component Value Date/Time Cholesterol, total 106 10/16/2018 05:04 AM  
 HDL Cholesterol 32 (L) 10/16/2018 05:04 AM  
 LDL, calculated 50.6 10/16/2018 05:04 AM  
 VLDL, calculated 23.4 10/16/2018 05:04 AM  
 Triglyceride 117 10/16/2018 05:04 AM  
 CHOL/HDL Ratio 3.3 10/16/2018 05:04 AM  
  
BNP No results for input(s): BNPP in the last 72 hours. Liver Enzymes No results for input(s): TP, ALB, TBIL, AP, SGOT, GPT in the last 72 hours. No lab exists for component: DBIL Thyroid Studies No results found for: T4, T3U, TSH, TSHEXT Procedures/imaging: see electronic medical records for all procedures/Xrays and details which were not copied into this note but were reviewed prior to creation of Plan Aminah Kirby PA-C

## 2018-10-18 NOTE — PROGRESS NOTES
Liz Morales, EEG tech from [facility], called to state that they are sending an EEG tech to our facility per our orders. The tech is coming from [place] and will be here as soon as possible.

## 2018-10-18 NOTE — PROGRESS NOTES
PM was notified that family wishes for patient to be DNR and thought she had paperwork stating such. PM team met with patient, three dtrs (Charmaine Moses) and granddaughter Samson Kraft at bedside. Patient was AAOx3 to place, person, and time. She was slightly confused to situation and events leading to hospitalization. Patient was able to recall completing new AMD last night to name dtr Olegaroi Pina primary medical agent. She confirmed wishes for not wanting life prolonging measures if death was imminent or in vegetative state. Discussed code status of DNR and with no hesitation patient stated wishing for no CPR and to have a natural death. Patient signed her own Durable DNR. Patient stated being tried of have the same conversation and doesn't wish to discuss any longer. Family was in agreement with all of patients wishes. Copies of DDNR were made and placed on chart. Thank you for the opportunity to assist in the care of Mrs. Carolina Bates. Sarah Gomez MSW Palliative Medicine

## 2018-10-18 NOTE — ACP (ADVANCE CARE PLANNING)
was called to execute an Advance Medical Directive. This directive will replace the current document on file. The directive was completed, copies given to the patient and family, copy to patient's chart.

## 2018-10-18 NOTE — PROGRESS NOTES
Chart reviewed. Discharge dispo: TBD. Pt will need updated PT/OT notes to assist in finding placement. Per previous cm notes, referral has been placed for [facility]RI for acute rehab. CM will cont to follow.

## 2018-10-18 NOTE — PROGRESS NOTES
NEUROLOGY PROGRESS NOTE Patient: Shilpi Omalley        Sex: female          DOA: 10/15/2018 YOB: 1932      Age:  80 y.o.         LOS: 3 days Identification: 
[id number] -American female presents with sudden onset of right facial weakness, right arm and leg weakness as well as aphasia. SUBJECTIVE:  
Patient is fully awake this morning, but while she was having PT, she had sudden onset of confusion, slurred speech, which was gradually resolved after 15 minutes. She was witness to have some arm shaking, with worsening right facial weakness. Patient remember the event, said that she was tired after PT, did not want to talk to people around her. REVIEW OF SYSTEMS: Denies chest pain, abdominal pain, nausea or vomiting. No fever or chills. OBJECTIVE:  
  
Visit Vitals BP (!) 132/111 (BP 1 Location: Right arm, BP Patient Position: At rest) Pulse 94 Temp 98.6 °F (37 °C) Resp 18 Ht 5' 2\" (1.575 m) Wt 92.6 kg (204 lb 3.2 oz) SpO2 100% BMI 37.35 kg/m² Physical Exam: 
GEN: Alert, NAD 
EYES: conjunctiva normal, lids with out lesions HEENT: MMM. HEART: RRR +S1 +S2 LUNGS: CTA B/L no rales or rhonchi. ABDOMEN: Soft, non-tender. EXTREMITIES: No edema cyanosis SKIN: no rashes or skin breakdown, no nodules NEURO: awake, able to carry conversation with me, subtle expressive aphasia. Cranial nerves: subtle right facial weakness. Extraocular movements are intact with no nystagmus. Visual fields are full to confrontation. PERRL. Tongue is midline. Palate elevates symmetrically. Hearing intact to speech. Sternocleidomastoid and trapezius strengths are full bilaterally. Motor:4+/5 right upper ane lower limbs muscle tone is reduced on right upper and lower limbs, Normal tone and normal bulk on left upper and lower limbs. Sensory:grossly normal both sides.  Sensory neglect on right upper and lower limbs  
Coordination: normal.  
 Deep tendon reflexes: 2+ at biceps, brachioradialis, patella and ankles bilaterally. Toes are  Mute. Gait assessment: deferred Current Facility-Administered Medications Medication Dose Route Frequency  HYDROcodone-acetaminophen (NORCO) 5-325 mg per tablet 1 Tab  1 Tab Oral Q6H PRN  
 fluticasone-vilanterol (BREO ELLIPTA) 100mcg-25mcg/puff  1 Puff Inhalation DAILY  montelukast (SINGULAIR) tablet 10 mg  10 mg Oral QHS  heparin 25,000 units in D5W 250 ml infusion  18-36 Units/kg/hr IntraVENous TITRATE  famotidine (PF) (PEPCID) 20 mg in sodium chloride 0.9% 10 mL injection  20 mg IntraVENous DAILY  0.9% sodium chloride infusion  100 mL/hr IntraVENous CONTINUOUS  
 insulin lispro (HUMALOG) injection   SubCUTAneous AC&HS  sodium chloride (NS) flush 5-10 mL  5-10 mL IntraVENous PRN  
 sodium chloride (NS) flush 5-10 mL  5-10 mL IntraVENous Q8H  
 sodium chloride (NS) flush 5-10 mL  5-10 mL IntraVENous PRN  
 labetalol (NORMODYNE;TRANDATE) 20 mg/4 mL (5 mg/mL) injection 5 mg  5 mg IntraVENous Q10MIN PRN  
 glucose chewable tablet 16 g  16 g Oral PRN  
 glucagon (GLUCAGEN) injection 1 mg  1 mg IntraMUSCular PRN  
 dextrose (D50W) injection syrg 25 g  50 mL IntraVENous PRN Laboratory Recent Results (from the past 24 hour(s)) GLUCOSE, POC Collection Time: 10/17/18  1:12 PM  
Result Value Ref Range Glucose (POC) 150 (H) 70 - 110 mg/dL PROTHROMBIN TIME + INR Collection Time: 10/17/18  2:30 PM  
Result Value Ref Range Prothrombin time 13.0 11.5 - 15.2 sec INR 1.0 0.8 - 1.2 GLUCOSE, POC Collection Time: 10/17/18  4:59 PM  
Result Value Ref Range Glucose (POC) 104 70 - 110 mg/dL CBC WITH AUTOMATED DIFF Collection Time: 10/17/18  8:15 PM  
Result Value Ref Range WBC 6.5 4.6 - 13.2 K/uL  
 RBC 3.54 (L) 4.20 - 5.30 M/uL HGB 7.4 (L) 12.0 - 16.0 g/dL HCT 26.0 (L) 35.0 - 45.0 %  MCV 73.4 (L) 74.0 - 97.0 FL  
 MCH 20.9 (L) 24.0 - 34.0 PG  
 MCHC 28.5 (L) 31.0 - 37.0 g/dL RDW 28.3 (H) 11.6 - 14.5 % PLATELET 385 477 - 259 K/uL MPV 8.6 (L) 9.2 - 11.8 FL  
 NEUTROPHILS 70 40 - 73 % LYMPHOCYTES 18 (L) 21 - 52 % MONOCYTES 10 3 - 10 % EOSINOPHILS 2 0 - 5 % BASOPHILS 0 0 - 2 %  
 ABS. NEUTROPHILS 4.5 1.8 - 8.0 K/UL  
 ABS. LYMPHOCYTES 1.2 0.9 - 3.6 K/UL  
 ABS. MONOCYTES 0.7 0.05 - 1.2 K/UL  
 ABS. EOSINOPHILS 0.1 0.0 - 0.4 K/UL  
 ABS. BASOPHILS 0.0 0.0 - 0.1 K/UL  
 RBC COMMENTS ANISOCYTOSIS 2+ 
    
 RBC COMMENTS MICROCYTOSIS 1+ 
    
 RBC COMMENTS POLYCHROMASIA 1+ 
    
 RBC COMMENTS HYPOCHROMIA 1+ 
    
 RBC COMMENTS OVALOCYTES 1+ 
    
 DF AUTOMATED    
PTT Collection Time: 10/17/18  8:15 PM  
Result Value Ref Range aPTT 35.4 23.0 - 36.4 SEC GLUCOSE, POC Collection Time: 10/17/18  9:39 PM  
Result Value Ref Range Glucose (POC) 116 (H) 70 - 110 mg/dL PROTHROMBIN TIME + INR Collection Time: 10/18/18  3:15 AM  
Result Value Ref Range Prothrombin time 14.7 11.5 - 15.2 sec INR 1.2 0.8 - 1.2 METABOLIC PANEL, BASIC Collection Time: 10/18/18  3:15 AM  
Result Value Ref Range Sodium 144 136 - 145 mmol/L Potassium 4.2 3.5 - 5.5 mmol/L Chloride 110 (H) 100 - 108 mmol/L  
 CO2 26 21 - 32 mmol/L Anion gap 8 3.0 - 18 mmol/L Glucose 113 (H) 74 - 99 mg/dL BUN 10 7.0 - 18 MG/DL Creatinine 1.19 0.6 - 1.3 MG/DL  
 BUN/Creatinine ratio 8 (L) 12 - 20 GFR est AA 52 (L) >60 ml/min/1.73m2 GFR est non-AA 43 (L) >60 ml/min/1.73m2 Calcium 8.3 (L) 8.5 - 10.1 MG/DL  
CBC WITH AUTOMATED DIFF Collection Time: 10/18/18  3:15 AM  
Result Value Ref Range WBC 6.3 4.6 - 13.2 K/uL  
 RBC 3.51 (L) 4.20 - 5.30 M/uL HGB 7.4 (L) 12.0 - 16.0 g/dL HCT 26.0 (L) 35.0 - 45.0 % MCV 74.1 74.0 - 97.0 FL  
 MCH 21.1 (L) 24.0 - 34.0 PG  
 MCHC 28.5 (L) 31.0 - 37.0 g/dL RDW 28.7 (H) 11.6 - 14.5 % PLATELET 608 386 - 192 K/uL MPV 9.2 9.2 - 11.8 FL  
 NEUTROPHILS 76 (H) 42 - 75 % BAND NEUTROPHILS 1 0 - 5 % LYMPHOCYTES 15 (L) 20 - 51 % MONOCYTES 5 2 - 9 % EOSINOPHILS 2 0 - 5 % BASOPHILS 1 0 - 3 %  
 ABS. NEUTROPHILS 4.8 1.8 - 8.0 K/UL  
 ABS. LYMPHOCYTES 0.9 0.8 - 3.5 K/UL  
 ABS. MONOCYTES 0.3 0 - 1.0 K/UL  
 ABS. EOSINOPHILS 0.1 0.0 - 0.4 K/UL  
 ABS. BASOPHILS 0.1 0.0 - 0.1 K/UL  
 RBC COMMENTS ANISOCYTOSIS 2+ 
    
 RBC COMMENTS POLYCHROMASIA 1+ 
    
 RBC COMMENTS MICROCYTOSIS 2+ 
    
 RBC COMMENTS HYPOCHROMIA 1+ 
    
 RBC COMMENTS OVALOCYTES 1+ 
    
 DF MANUAL    
PTT Collection Time: 10/18/18  3:15 AM  
Result Value Ref Range aPTT >180.0 (HH) 23.0 - 36.4 SEC GLUCOSE, POC Collection Time: 10/18/18  6:20 AM  
Result Value Ref Range Glucose (POC) 124 (H) 70 - 110 mg/dL GLUCOSE, POC Collection Time: 10/18/18 12:20 PM  
Result Value Ref Range Glucose (POC) 138 (H) 70 - 110 mg/dL ASSESSMENT/IMPRESSION:  
81 YO AAF presents with sudden onset of aphasia, dysarthria, right sided weakness,mer neglect and aphasia. She had tiny multifocal acute infarct likely embolic stroke with PE.  
 
1. Acute ischemic stroke 2. Pulmonary embolism. 3. Acute Encephalopathy: resolved, likely secondary to medical condition such as hypoglycemia, hypertension given that patient remembered the whole event, she was feeling tired after PT. Less likely complex partial seizure cannot be excluded. 4.Colon cancer status post surgery 
 
  
TPA was not given due to recent stroke, and GI surgery, she is also out of tpa window. She is not a candidate for thrombectomy.  
  
Please use stroke admission order sets. 1. Continue treating PE and DVT per primary team 
2. CT head this AM ruled out hemorrhagic conversion. 3. EEG routine to further evaluation. 4. Please limit narcotics or benzo if possible. Please give NSAIDs for moderate pain.  
5. Please do accucheck and BP assessment if she continues to have episodical encephalopathy.   
 6. Permissive hypertension (do not treat if BP is not >200/110, prefer prn labetolol 5mg) 7. IV normal saline continuous infusion 100-125 ml/h 
8. Euglycemia with sliding scale insulin 9. Euthermia with acetaminophen 650mg Q6h prn for fever. 10. Continue PT/OT/ST I will follow the patient. Please do not hesitate to return with any questions. Signed:  
Magali Pritchard MD 
10/18/2018 
1:07 PM

## 2018-10-18 NOTE — PROGRESS NOTES
Problem: Falls - Risk of 
Goal: *Absence of Falls Document Genevive Camera Fall Risk and appropriate interventions in the flowsheet. Outcome: Progressing Towards Goal 
Fall Risk Interventions: 
Mobility Interventions: Assess mobility with egress test 
 
Mentation Interventions: Increase mobility, More frequent rounding Medication Interventions: Evaluate medications/consider consulting pharmacy Elimination Interventions: Bed/chair exit alarm, Call light in reach, Patient to call for help with toileting needs Problem: Pressure Injury - Risk of 
Goal: *Prevention of pressure injury Document Tavares Scale and appropriate interventions in the flowsheet. Outcome: Progressing Towards Goal 
Pressure Injury Interventions: 
Sensory Interventions: Assess changes in LOC Moisture Interventions: Apply protective barrier, creams and emollients Activity Interventions: Pressure redistribution bed/mattress(bed type), PT/OT evaluation Mobility Interventions: Pressure redistribution bed/mattress (bed type), PT/OT evaluation Nutrition Interventions: Document food/fluid/supplement intake Friction and Shear Interventions: HOB 30 degrees or less

## 2018-10-18 NOTE — ROUTINE PROCESS
Bedside and Verbal shift change report given to [facility] (oncoming nurse) by Josiane Lucio RN 
 (offgoing nurse). Report included the following information SBAR, Kardex and MAR.

## 2018-10-18 NOTE — PROGRESS NOTES
2335 Bedside verbal shift change report received from Crissy Carrion RN(offgoing nurse) given to Zena Porter RN(oncoming nurse). Report included SBAR,MAR,KARDEX,TELE,I/O 
 
0130 Pt voided 200cc of dark yellow urine via purewick. Pt's bed pad wet changed by 
 staff 
 
0500  Q4 neuro checks performed on pt. Pt denied pain and did not appear to be in any distress. Pt's vital signs remained stable. Pt's daughter stayed at the bedside overnight. Pt had and uneventful night.

## 2018-10-18 NOTE — OP NOTES
OPERATIVE NOTE Patient: Doris Mariscal MRN: [id number]  SSN: [id number] YOB: 1932  Age: 80 y.o. Sex: female Indications: This is a 80y.o. year-old female who presents with GI bleed. she  was positive for right colon cancer on colonoscopy. The patient was admitted for surgery as conservative measures have failed. Date of Procedure: 10/8/2018 Preoperative Diagnosis: COLON CANCER, GI BLEED Postoperative Diagnosis: COLON CANCER METASTATIC TO SMALL BOWEL, GI BLEED Procedure: Procedure(s) with comments: 
RIGHT COLECTOMY,RESECTION OF MESENTERIC MASS, SMALL BOWEL RESECTION - RIGHT COLECTOMY Surgeon(s): Surgeon(s) and Role: Brown Hernández, DO - Primary Anesthesia: General  
 
Procedure: The patient was consented and all questions answered. Consent was verified. The patients operative site was marked. The patient was brought to the OR and placed in the supine position. Pre-operative antibiotics, 1gm Invanz, was dosed prior to the incision. Heparin was given in the preoperative holding area for DVT prophylaxis. The patient was secured with a lower body strap and all pressure points were padded. A julian hugger warming unit was placed across the upper body and SCDs were placed to bilateral lower extremities. General anesthesia was then induced without complication. A Mackenzie catheter was placed after the patient was anesthetized. The patients abdomen was prepped and draped in the standard sterile fashion. A surgical timeout was completed. A vertical midline incision was made and the abdomen was entered without difficulty. Under direct visualization an exparal TAP block was performed. An Josr wound retractor was placed. The decision was made at this point to proceed with right colectomy. The right colon was grasped and elevated. The mesentery was scored with electrocautery and the ileocolic pedicle identified.  This pedicle was transected with a suture ligasure. Starting at the cecum, the ascending colon was mobilized lateral to medial. The colonic mobilization was continued to the proximal transverse colon freeing the hepatic flexure. The right branch of the middle colic artery was also ligated using the enseal energy device. The terminal ileum was transected at approximately 10cm from the ICV using a 75mm ANYI stapler with a blue load. The duodenum was appropriately visualized and protected as well as the right ureter. Additionally the greater omentum was dissected from the proximal transverse colon. The hepatic flexure and transverse colon were found to be freely mobile. The terminal ileum and small bowel were very mobile and was confirmed to be easily moved to the RUQ. The proximal transverse colon was transected using 2x75mm ANYI with a blue load. The specimen was retrieved and passed off the field for pathologic evaluation. A large 4cm oval mass was within the small bowel mesentery. This was thought to be metastatic disease and was resected for pathological evaluation. The ileum and transverse colon were laid parallel to each other in an isoperistaltic fashion aligning the anti-mesenteric borders with the small bowel inferior and anterior to the colon. Small enterotomies made in each. A 75mm blue load endoGIA stapler was placed down each limb of bowel and creating the common channel of the anastomosis. Another 75mm green load endoGIA stapler was used to extend the common channel. After firing the stapler, the staple line was inspected endoluminally with a ring forceps and found to be widely patent and hemostatic. The common enterotomy was then closed using a 60mm TL stapler. After hemostasis at the staple line was confirmed, the bowel was carefully returned to the abdomen. The abdomen was irrigated and irrigant removed via suction.  The abdomen was inspected and found to be hemostatic and the anastomosis in good position without torsion, tension or herniation of loops. During inspection of the small bowel there was an area of jejunum which had a hard white irregularly shaped 5cm mass in the mesentery which encroached upon the small bowel causing an impending obstruction. The small bowel with obstructing tumor was resected and anastomosis was performed using 4-0 PDS suture. Small bowel segment was sent to pathology for evaluation and didn't seem to have the typical appearance of colon cancer metastasis. Omentum was placed over the anastomosis. The midline fascia was closed a running #1 PDS suture. The wounds were irrigated and a penrose drain was placed in the wound for drainage and the skin was partially closed with staples. A sterile dressing was applied. The patient was awakened from anesthesia and  transported to the PACU in stable condition. having tolerated the procedure well. Sponge/ Needle/ Instrument count reported as correct. 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 Estimated Blood Loss: Minimal 
 
Specimens:  
ID Type Source Tests Collected by Time Destination 1 : Mesenteric Mass Preservative OTHER (use comments)  Rios Chamorro,  10/8/2018 2134 Pathology 2 : Right Colon Preservative Colon, Right  Rios Chamorro, DO 10/8/2018 2150 Pathology 3 : Small Bowel Preservative Small Bowel  Rios Chamorro, DO 10/8/2018 2300 Pathology Drains: none and Penrose drain in the wound Implants: * No implants in log * Complications: None; patient tolerated the procedure well.  
 
Wilda Dimas DO 
10/17/2018 
9:13 PM

## 2018-10-18 NOTE — ACP (ADVANCE CARE PLANNING)
Patient completed new Advance Directive with . Current Primary MPOA is daughter, Catracho Leonidas [phone]. Daughter, Mitali Dillon [phone]. Daughter, Dian Toney [phone]. Remains Full Code.

## 2018-10-18 NOTE — PROGRESS NOTES
Problem: Mobility Impaired (Adult and Pediatric) Goal: *Acute Goals and Plan of Care (Insert Text) Physical Therapy Goals Initiated 10/16/2018 and to be accomplished within 5-7 day(s) 1. Patient will move from supine <> sit with S in prep for out of bed activity and change of position. 2.  Patient will perform sit<> stand with S with LRAD in prep for transfers/ambulation. 3.  Patient will transfer from bed <> chair with S with LRAD for time up in chair for completion of ADL activity. 4.  Patient will ambulate 150 feet with LRAD/S for improved functional mobility/safe discharge. Outcome: Progressing Towards Goal 
physical Therapy TREATMENT Patient: Leonor Cruz (89 y.o. female) Date: 10/18/2018 Diagnosis: Stroke [facility]) Stroke ([facility] [address] Precautions: Fall, Aspiration(CVA`) Chart, physical therapy assessment, plan of care and goals were reviewed. ASSESSMENT: 
After communication with Hospitalist PA, Pt was cleared to participate in PT session. Pt demonstrated much improved balance and nearly symmetrical strength (R slightly weaker). Pt completes exercises and 2 standing attempts, before initiation of ambulation. Tolerance sharply decreased after 15' of amb, with pt response time declining and decrease of standing quality. Pt assisted to sitting, where her BP was found to be 156/126 (previously 120/76). Pt dependently returned to bedside and to supine, where BP was 142/101. No improvement of symptoms and Pt now presents with facial droop and tremor. Hospitalist PA requested to return to room for further assessmed. After 2 min, BP was 152/114. Hospitalist enters for another assessment with last BP of 156/118 reported to her, with full details of situation progression. Will wait for determination of pt's appropriateness for any future activity. Progression toward goals: 
[]      Improving appropriately and progressing toward goals [x]      Improving slowly and progressing toward goals 
[]      Not making progress toward goals and plan of care will be adjusted PLAN: 
Patient continues to benefit from skilled intervention to address the above impairments. Continue treatment per established plan of care. Discharge Recommendations:  Acute Rehab Further Equipment Recommendations for Discharge:  bedside commode and rolling walker SUBJECTIVE:  
Patient stated I feel pretty good.  OBJECTIVE DATA SUMMARY:  
Critical Behavior: 
Neurologic State: Alert Orientation Level: Oriented to person, Oriented to place, Oriented to situation, Disoriented to time Cognition: Decreased command following Safety/Judgement: Decreased insight into deficits, Decreased awareness of need for safety, Decreased awareness of need for assistance, Decreased awareness of environment Functional Mobility Training: 
Bed Mobility: 
Rolling: Contact guard assistance Supine to Sit: Contact guard assistance Sit to Supine: Maximum assistance Transfers: 
Sit to Stand: Minimum assistance Stand to Sit: Minimum assistance Balance: 
Sitting: Intact Standing: Intact Standing - Static: Good Standing - Dynamic : FairAmbulation/Gait Training: 
Distance (ft): 15 Feet (ft) Ambulation - Level of Assistance: Contact guard assistance;Maximum assistance Gait Abnormalities: Decreased step clearance;Shuffling gait; Step to gait Base of Support: Widened Stance: Weight shift Speed/Peggy: Slow Step Length: Right shortened;Left shortened Swing Pattern: Right symmetrical;Left symmetrical 
Therapeutic Exercises:  
 
 
EXERCISE Sets Reps Active Active Assist  
Passive Self ROM Comments Ankle Pumps 1 30  [] [] [] [] Quad Sets/Glut Sets 1 10 [] [] [] [] Hamstring Sets   [] [] [] [] Short Arc Quads   [] [] [] [] Heel Slides 1 5 [] [] [] [] Straight Leg Raises   [] [] [] [] Hip Abd/Add   [] [] [] [] Long Arc Quads 2 10 [] [] [] [] Seated Marching 1 10 [] [] [] []   
Standing Marching   [] [] [] []   
   [] [] [] []   
 
 
Pain: 
Pain in: 3 Pain out: Pt is unable to rate Activity Tolerance:  
Poor (Due  
Please refer to the flowsheet for vital signs taken during this treatment. After treatment:  
[] Patient left in no apparent distress sitting up in chair 
[x] Patient left in bed with MD and NP student assessing  
[x] Call bell left within reach [x] Nursing notified 
[] Caregiver present 
[] Bed alarm activated Zehra Vickers PTA Time Calculation: 35 mins

## 2018-10-18 NOTE — PROGRESS NOTES
1930: Care assumed of pt from [facility]. Pt appears to be in stable condition. No acute distress noted. Witness pts signature for POA paper work. Pt has no requests at this time. 2050: Spoke with Dr. Maggie Stone regarding iv fluid orders. Orders received to d/;c NS. Will monitor. 2230: Pt due to void. But has not voided. Bladder scan showed 88 cc of urine in bladder. Notified Dr. Maggie Stone. Orders received to monitor pt.

## 2018-10-19 NOTE — PROGRESS NOTES
NEUROLOGY PROGRESS NOTE Patient: Sukhjinder He        Sex: female          DOA: 10/15/2018 YOB: 1932      Age:  80 y.o.         LOS: 4 days Identification: 
[id number] -American female presents with sudden onset of right facial weakness, right arm and leg weakness as well as aphasia. SUBJECTIVE:  
Patient was a sleep this morning, was easily aroused, told me that she had breakfast, It was tasted good. Her daughters were in the room, stated that patient is back to her normal self. She was resolved yesterday after CT scan was finished. Stroke Work-up: 
Brain MRI: Mri Brain Wo Cont Result Date: 10/17/2018 IMPRESSION: Motion degraded study. 1.  Several small foci of acute infarct involving the left frontal cortex, left insular cortex, left occipital lobe, and right cerebellar hemisphere. Probable additional small subacute punctate left frontal cortical infarcts. No evidence of associated hemorrhage or mass effect. Given distribution, suspect central embolic origin. Clinical correlation is recommended. 2. Stable chronic infarcts including bilateral frontal lobes, left frontoparietal subcortical white matter, left frontal perisylvian cortex, and bilateral cerebellar hemispheres. 3. Stable, moderate nonspecific white matter disease likely representing chronic small vessel changes. Cta Head Neck W Cont Result Date: 10/17/2018 IMPRESSION: 1. No definite large vessel intracranial occlusion. Moderate stenosis suggested involving distal left MCA branches. 2.  No hemodynamically significant ICA stenosis, based on NASCET criteria. 3. No high-grade vertebral artery stenosis. 4. Incidentally seen acute pulmonary emboli greater on the right. Dilated main pulmonary artery compatible with pulmonary arterial hypertension. No large central saddle embolus is seen; heart incompletely visualized to evaluate for potential RV strain.  5. Additional partially visualized bibasilar airspace disease, nonspecific perhaps atelectasis, pneumonia, or aspiration. Partially visualized small bilateral pleural effusions. CRITICAL RESULT:  These unexpected critical results of impression #4 and acute pulmonary emboli were directly communicated to Dr. Leonarda Hollins at 9:15 AM on 10:17 AM. Results understood and acknowledged. Echocardiogram: Estimated left ventricular ejection fraction is 56 - 60%. Normal left ventricular wall motion, no regional wall motion abnormality noted. Mild (grade 1) left ventricular diastolic dysfunction E/E' is 8. Flores Thurmond · Right ventricular cavity size is moderately dilated. · Moderate aortic valve sclerosis. · Mitral valve non-specific thickening. Mild mitral annular calcification. Mild to moderate mitral valve regurgitation. · Moderate tricuspid valve regurgitation is present. Moderate pulmonary hypertension is present. Pulmonary arterial systolic pressure is 93.69 mmHg. · Mild pulmonic valve regurgitation is present. · Pulmonary arterial systolic pressure is 61.19 mmHg. · Severely elevated central venous pressure (15+ mmHg); IVC diameter is larger than 21 mm and collapses less than 50% with respiration. · Pulmonary arterial systolic pressure is 27.46 mmHg. · Right atrial cavity size is moderately dilated. · Pulmonary arterial systolic pressure is 10.25 mmHg. Pulmonary arterial systolic pressure is 13.14 mmHg. Lipid panel:  
Lab Results Component Value Date/Time Cholesterol, total 106 10/16/2018 05:04 AM  
 HDL Cholesterol 32 (L) 10/16/2018 05:04 AM  
 LDL, calculated 50.6 10/16/2018 05:04 AM  
 VLDL, calculated 23.4 10/16/2018 05:04 AM  
 Triglyceride 117 10/16/2018 05:04 AM  
 CHOL/HDL Ratio 3.3 10/16/2018 05:04 AM  
 
HbA1c:  
Lab Results Component Value Date/Time Hemoglobin A1c 5.8 (H) 10/16/2018 05:04 AM  
 
REVIEW OF SYSTEMS: Denies chest pain, abdominal pain, nausea or vomiting. No fever or chills. OBJECTIVE:  
  
Visit Vitals /70 (BP 1 Location: Right arm, BP Patient Position: At rest) Pulse 86 Temp 98.7 °F (37.1 °C) Resp 16 Ht 5' 2\" (1.575 m) Wt 92.6 kg (204 lb 3.2 oz) SpO2 94% BMI 37.35 kg/m² Physical Exam: 
GEN: Alert, NAD 
EYES: conjunctiva normal, lids with out lesions HEENT: MMM. HEART: RRR +S1 +S2 LUNGS: CTA B/L no rales or rhonchi. ABDOMEN: Soft, non-tender. EXTREMITIES: No edema cyanosis SKIN: no rashes or skin breakdown, no nodules NEURO: awake, able to carry conversation with me, subtle expressive aphasia. Cranial nerves: subtle right facial weakness. Extraocular movements are intact with no nystagmus. Visual fields are full to confrontation. PERRL. Tongue is midline. Palate elevates symmetrically. Hearing intact to speech. Sternocleidomastoid and trapezius strengths are full bilaterally. Motor:Normal strength and muscle in all four extremities,pronator drift in right arm and leg. Sensory:grossly normal both sides. Sensory neglect on right upper and lower limbs  
Coordination: normal.  
Deep tendon reflexes: 2+ at biceps, brachioradialis, patella and ankles bilaterally. Toes are  Mute. Gait assessment: deferred Current Facility-Administered Medications Medication Dose Route Frequency  fluticasone-vilanterol (BREO ELLIPTA) 100mcg-25mcg/puff  1 Puff Inhalation DAILY  montelukast (SINGULAIR) tablet 10 mg  10 mg Oral QHS  heparin 25,000 units in D5W 250 ml infusion  18-36 Units/kg/hr IntraVENous TITRATE  famotidine (PF) (PEPCID) 20 mg in sodium chloride 0.9% 10 mL injection  20 mg IntraVENous DAILY  insulin lispro (HUMALOG) injection   SubCUTAneous AC&HS  sodium chloride (NS) flush 5-10 mL  5-10 mL IntraVENous PRN  
 sodium chloride (NS) flush 5-10 mL  5-10 mL IntraVENous Q8H  
 sodium chloride (NS) flush 5-10 mL  5-10 mL IntraVENous PRN  
 labetalol (NORMODYNE;TRANDATE) 20 mg/4 mL (5 mg/mL) injection 5 mg  5 mg IntraVENous Q10MIN PRN  
 glucose chewable tablet 16 g  16 g Oral PRN  
 glucagon (GLUCAGEN) injection 1 mg  1 mg IntraMUSCular PRN  
 dextrose (D50W) injection syrg 25 g  50 mL IntraVENous PRN Laboratory Recent Results (from the past 24 hour(s)) GLUCOSE, POC Collection Time: 10/18/18 12:20 PM  
Result Value Ref Range Glucose (POC) 138 (H) 70 - 110 mg/dL GLUCOSE, POC Collection Time: 10/18/18  4:36 PM  
Result Value Ref Range Glucose (POC) 159 (H) 70 - 110 mg/dL PTT Collection Time: 10/18/18  8:50 PM  
Result Value Ref Range aPTT 30.1 23.0 - 36.4 SEC GLUCOSE, POC Collection Time: 10/18/18 10:02 PM  
Result Value Ref Range Glucose (POC) 121 (H) 70 - 110 mg/dL PROTHROMBIN TIME + INR Collection Time: 10/19/18  3:02 AM  
Result Value Ref Range Prothrombin time 14.0 11.5 - 15.2 sec INR 1.1 0.8 - 1.2 METABOLIC PANEL, BASIC Collection Time: 10/19/18  3:02 AM  
Result Value Ref Range Sodium 146 (H) 136 - 145 mmol/L Potassium 4.2 3.5 - 5.5 mmol/L Chloride 111 (H) 100 - 108 mmol/L  
 CO2 29 21 - 32 mmol/L Anion gap 6 3.0 - 18 mmol/L Glucose 112 (H) 74 - 99 mg/dL BUN 9 7.0 - 18 MG/DL Creatinine 1.12 0.6 - 1.3 MG/DL  
 BUN/Creatinine ratio 8 (L) 12 - 20 GFR est AA 56 (L) >60 ml/min/1.73m2 GFR est non-AA 46 (L) >60 ml/min/1.73m2 Calcium 8.4 (L) 8.5 - 10.1 MG/DL  
CBC WITH AUTOMATED DIFF Collection Time: 10/19/18  3:02 AM  
Result Value Ref Range WBC 6.2 4.6 - 13.2 K/uL  
 RBC 3.27 (L) 4.20 - 5.30 M/uL HGB 7.0 (L) 12.0 - 16.0 g/dL HCT 24.4 (L) 35.0 - 45.0 % MCV 74.6 74.0 - 97.0 FL  
 MCH 21.4 (L) 24.0 - 34.0 PG  
 MCHC 28.7 (L) 31.0 - 37.0 g/dL RDW 28.7 (H) 11.6 - 14.5 % PLATELET 449 622 - 012 K/uL MPV 9.3 9.2 - 11.8 FL  
 NEUTROPHILS 73 40 - 73 % LYMPHOCYTES 18 (L) 21 - 52 % MONOCYTES 7 3 - 10 % EOSINOPHILS 2 0 - 5 % BASOPHILS 0 0 - 2 %  
 ABS. NEUTROPHILS 4.6 1.8 - 8.0 K/UL ABS. LYMPHOCYTES 1.1 0.9 - 3.6 K/UL  
 ABS. MONOCYTES 0.4 0.05 - 1.2 K/UL  
 ABS. EOSINOPHILS 0.1 0.0 - 0.4 K/UL  
 ABS. BASOPHILS 0.0 0.0 - 0.1 K/UL  
 RBC COMMENTS ANISOCYTOSIS 2+ 
    
 RBC COMMENTS MICROCYTOSIS 1+ 
    
 RBC COMMENTS POLYCHROMASIA 1+ 
    
 RBC COMMENTS OVALOCYTES 1+ RBC COMMENTS SPHEROCYTES 
FEW 
SCHISTOCYTES 
FEW 
    
 DF AUTOMATED    
PTT Collection Time: 10/19/18  3:02 AM  
Result Value Ref Range aPTT 130.6 (H) 23.0 - 36.4 SEC GLUCOSE, POC Collection Time: 10/19/18  6:27 AM  
Result Value Ref Range Glucose (POC) 110 70 - 110 mg/dL ASSESSMENT/IMPRESSION:  
 
79 YO AAF presents with sudden onset of aphasia, dysarthria, right sided weakness,mer neglect and aphasia. She had tiny multifocal acute infarct likely embolic stroke with PE.  
  
1. Acute ischemic stroke 2. Pulmonary embolism. 3. Acute Encephalopathy: resolved, likely secondary to medical condition such as hypoglycemia, hypertension given that patient remembered the whole event, she was feeling tired after PT. EEG did not show epileptiform changes. CT head ruled out hemorraghic conversion 4. Colon cancer status post surgery 
 
   
TPA was not given due to recent stroke, and GI surgery, she is also out of tpa window. She is not a candidate Please use stroke admission order sets. 
  
1. Continue treating PE and DVT per primary team, Warfarin or Eliquis are ok when it is ready to switch to oral anticoagulant. 2. Please limit narcotics or benzo if possible. Please give NSAIDs for moderate pain. 3. Please do accucheck and BP assessment if she continues to have episodical encephalopathy. 4. BP goal< 140/90 will   
5. Continue Lipitor 40 mg qhs 
6. Continue PT/OT/ST No further recommendation from neurology, we will sign off at this point, please do not hesitate to call if there is any questions. I will follow up with patient in the clinic after 2 months. Will Signed: Amy Laguerre MD 
10/19/2018 10:07 AM

## 2018-10-19 NOTE — PROGRESS NOTES
Problem: Mobility Impaired (Adult and Pediatric) Goal: *Acute Goals and Plan of Care (Insert Text) Physical Therapy Goals Initiated 10/16/2018 and to be accomplished within 5-7 day(s) 1. Patient will move from supine <> sit with S in prep for out of bed activity and change of position. 2.  Patient will perform sit<> stand with S with LRAD in prep for transfers/ambulation. 3.  Patient will transfer from bed <> chair with S with LRAD for time up in chair for completion of ADL activity. 4.  Patient will ambulate 150 feet with LRAD/S for improved functional mobility/safe discharge. Outcome: Not Met After communication with Hospitalist PA, pt is beginning transitioned to hospice/comfort care and will be D/c'd to home. PT will sign off of this case, but willing to re-evaluate, if situation improves.

## 2018-10-19 NOTE — PROGRESS NOTES
Problem: Dysphagia (Adult) Goal: *Acute Goals and Plan of Care (Insert Text) Rec:  
Mechanical soft/ground, thin liquids Meds crushed in puree Aspiration precautions 90 degree positioning for all intake Small bites/sips, straws ok Reduced rate of intake Alternating bites/sips Oral care post intake Goals:  Patient will: 1. Tolerate PO trials with 0 s/s overt distress in 4/5 trials 2. Utilize compensatory swallow strategies/maneuvers (decrease bite/sip, size/rate, alt. liq/sol) with min cues in 4/5 trials 3. Perform oral-motor/laryngeal exercises to increase oropharyngeal swallow function with min cues 4. Complete an objective swallow study (i.e., MBSS) to assess swallow integrity, r/o aspiration, and determine of safest LRD, min A Outcome: Progressing Towards Goal 
Speech language pathology dysphagia treatment Patient: Rei Tsai (10 y.o. female) Date: 10/19/2018 Diagnosis: Stroke [facility]) Stroke ([facility] [address] Precautions:  Fall, Aspiration(CVA`) PLOF: Rehab  
 
ASSESSMENT: 
Followed up this day with dysphagia tx. Pt A&Ox2. Daughters at bedside. Pt required mod encouragement for PO trials. Accepted self-fed thin liquid + straw; 0 clinical s/sx of aspiration. Requires frequent verbal cues to reduce rate of intake. Observed with regular solid trial; demo delayed mastication and mild lingual residue, cleared effectively given increased time and thin liquid wash. Recommend initiation of mechanical soft, ground/thin liquid diet with aspiration precautions, small sips and reduced rate of intake. D/w RNVenancio. Safe swallowing guidelines d/w pt and daughters, verbalized comprehension. Progression toward goals: 
[]         Improving appropriately and progressing toward goals [x]         Improving slowly and progressing toward goals 
[]         Not making progress toward goals and plan of care will be adjusted PLAN: 
Recommendations and Planned Interventions: 
Mech-soft/thin liquid Patient continues to benefit from skilled intervention to address the above impairments. Continue treatment per established plan of care. Discharge Recommendations: To Be Determined SUBJECTIVE:  
Patient stated OK. OBJECTIVE:  
Cognitive and Communication Status: 
Neurologic State: Alert Orientation Level: Oriented to person, Oriented to place, Oriented to situation, Disoriented to time Cognition: Follows commands Perception: Tactile, Verbal, Visual 
Perseveration: Tactile cues provided, Verbal cues provided, Visual cues provided Safety/Judgement: Decreased insight into deficits, Decreased awareness of need for safety, Decreased awareness of need for assistance, Decreased awareness of environment Dysphagia Treatment: 
Oral Assessment: 
Oral Assessment Labial: Decreased rate, Decreased seal 
Dentition: Edentulous, Limited, Poor, Other (comment)(Edentulous upper, very poor natural lower) Oral Hygiene: ([address]) Lingual: Decreased rate, Decreased strength, Incoordinated, Right deviation Velum: No impairment Mandible: No impairment Gag Reflex: Other (comment)(Not tested) P.O. Trials: 
 Patient Position: (\[facility] [place] [facility]\"" at 39) Vocal quality prior to P.O.: Phonation breaks Consistency Presented: Ice chips, Mechanical soft, Mixed consistency, Puree, Solid, Thin liquid How Presented: Self-fed/presented, SLP-fed/presented, Spoon, Straw Bolus Acceptance: No impairment Bolus Formation/Control: Impaired Type of Impairment: Delayed, Incomplete, Poor, Lip closure, Mastication, Piecemeal 
 Propulsion: Delayed (# of seconds), Discoordination, Tongue pumping Oral Residue: Right, Less than 10% of bolus, Lingual 
 Initiation of Swallow: Delayed (# of seconds) Laryngeal Elevation: Decreased, Functional 
 Aspiration Signs/Symptoms: Clear throat Pharyngeal Phase Characteristics: Easily fatigued , Effortful swallow, Poor endurance Effective Modifications:  Alternate liquids/solids, Straw, Spoon, Small sips and bites Cues for Modifications: Minimal-moderate Oral Phase Severity: Moderate Pharyngeal Phase Severity : Moderate PAIN: 
Start of Tx: 0 End of Tx: 0  
 
GCODESwallowing:  Swallow Current Status CK= 40-59%  Swallow Goal Status CJ= 20-39% The severity rating is based on the following outcomes: KAY Noms Swallow Level 4 Clinical Judgement After treatment:  
[]              Patient left in no apparent distress sitting up in chair 
[x]              Patient left in no apparent distress in bed 
[x]              Call bell left within reach [x]              Nursing notified 
[x]              Family present 
[]              Caregiver present 
[]              Bed alarm activated COMMUNICATION/EDUCATION:  
[x] Safe swallowing guidelines; compensatory techniques []        Patient unable to participate in education; education ongoing with staff 
[]  Posted safety precautions in patient's room. [] Oral-motor/laryngeal strengthening exercises FROILAN Martins Time Calculation: 9 mins

## 2018-10-19 NOTE — PROCEDURES
ELECTROENCEPHALOGRAPHY Patient: Jose Anaya MRN: [id number]  CSN: [id number] YOB: 1932  Age: 80 y.o. Sex: female DOA: 10/15/2018 LOS:  LOS: 4 days Date of Service: 10/18/2018 DICTATING: Princess Zain MD  
 
REFERRING PHYSICIAN: Dr. Princess Pittman ELECTROENCEPHALOGRAM NUMBER: [id number] HISTORY OF PRESENT ILLNESS: This is a 79 YO female, who presented with acute embolic stroke with episodes of altered level of consciousness with involuntary limb movement. This EEG was performed in order to assess electrodiagnostic risk factors for epilepsy. ELECTROENCEPHALOGRAM INTERPRETATION: This is a referential and bipolar EEG recorded with a 10-20 system. EEG background during wakefulness does not show posterior dominant rhythm. There is 2 to 3 Hz delta intermixed with 7 to 8 hertz theta activities intermixed with the background. Drowsiness is accompanied by loss of posterior dominant rhythm and generalized alpha and theta activities. A stage II sleep pattern is accompanied by vertex waves, sleep spindles and K-complexes. Photic stimulation elicits some driving response at certain flash frequencies without any abnormal activity. There are no epileptiform discharges or electrographic seizures in the study. IMPRESSION: This EEG is abnormal due to mild/moderate generalized slowing, which is a nonspecific indicator of cerebral dysfunction from any cause including toxic/metabolic etiologies as well as dementia. There are no interictal/ictal discharges during this study. Signed:  
Princess Zain MD 
10/19/2018 
7:41 AM

## 2018-10-19 NOTE — PROGRESS NOTES
Bedside and Verbal shift change report given to Rob Briggs (oncoming nurse) by Terence Stanley RN 
 (offgoing nurse). Report included the following information SBAR, Kardex and MAR.

## 2018-10-19 NOTE — ROUTINE PROCESS
The documentation for this period is being entered following the guidelines as defined in the [address] downtime policy by Prabhu Mclaughlin RN. 
 
 
[id number]

## 2018-10-19 NOTE — HOSPICE
Spoke with daughter Christine Lopez. Family meeting set up for 1900 10/19/18. Thank you for the referral to [facility] to care for Pt and family. Any questions or concerns please contact [phone].

## 2018-10-19 NOTE — PROGRESS NOTES
2000: Care assumed of pt at this time from [facility]. Heparin gtt not restarted yet on previous shift. Per Otis DORMAN/ Dr. Jayne Nyhan notes pt to be restarted on heparin gtt following the event with PT. Will draw PTT and restart heparin gtt at previous administration rate. 2053: IV site leaking, New IV accessed and PTT drawn and sent to the lab. Heparin gtt restarted at this time. Educated pt to report signs of bleeding. Will check PTT in 6 hours. No acute distress noted. Family at bedside. Will monitor. 0000: Pt resting. No acute changes noted. Will monitor. 0600: Pt had an uneventful shift. No acute distress noted. Family at bedside. No requests at this time. Will continue to monitor.

## 2018-10-19 NOTE — PROGRESS NOTES
[facility] Pulmonary, Critical Care, and Sleep Medicine Name: Amira Hameed MRN: [id number] : 1932 Hospital: [facility] [place] Date: 10/18/2018 Pulmonary Patient F/Up IMPRESSION:  
Principal Problem: 
  Stroke ([address] (2018) Active Problems: 
  Diabetes ([address] () Chronic respiratory failure with hypoxia, on home O2 therapy ([address] (2018) HTN (hypertension) (2018) Sarcoidosis of lung ([address] (10/4/2018) Acute pulmonary embolism ([address] (10/17/2018) Deep vein thrombosis (DVT) of popliteal vein of left lower extremity ([facility] [address] (10/17/2018) · PE R >L, incidental finding on CTA head neck · Left leg DVT · Improved encephalopathy, suspected from medications · Anemia, relatively stable Hgb from baseline · Colon cancer with recent colectomy sx 10/8/18 RECOMMENDATIONS:  
Compensated respiratory status. Supplemental O2 via NC, wean flow for goal SPO2> 90% Aspiration prevention bundle, head of the bed at 30' all times, pulmonary hygiene care Patient tolerating Heparin and follow up CT head is without any acute changes or hemorrhage Monitor PTT, CBC daily. Start anticoagulation with Coumadin tomorrow Discussed again with the patient and family [daughter with patient's permission] regarding indications for treatment, options of treatment, side effects of anticoagulation treatment including bleeding, medications-food interactions, duration of anti-coagulation, availability of any reversal agents and more ECHO suggests in comparison to the previous study of 2018, there are significant changes - RV size increased, RA size increased, Pulmonary hypertension increased Patient advised to monitor for any signs of bleeding- she verbalized understanding Stress ulcer prophylaxis DVT prophylaxis Heparin Diet as tolerated Palliative care consulted. DNR Will defer respective systems problem management to primary and other consultant and follow patient with primary and other medical team. 
Further recommendations will be based on the patient's response to recommended treatment and results of the investigation ordered. ADVANCE DIRECTIVE: DNR 
FAMILY DISCUSSION: Spoke with patient and with her permission with family Subjective/History: Ms. Anshul Russell has been seen and evaluated as Dr. Jessica Day requested now for assisting in ICU management. Patient is a 80 y.o. female with PMHx for sarcoidosis, chronic respiratory failure, colon cancer s/p colectomy 10/8/18, DM, HTN, recent CVA was brought to ER from [facility] with aphasia. In ER, CT head nil acute and advanced chronic microvascular changes mainly in the deep white matter. MRI could not be performed 2' to recent surgery staples. Patient was tx to ICU after noted to be somnolent. Patient at the time of this interview awake, alert and follows all commands. The patient reports having chronic cough, dyspnea, denies fever, chills, chest pain, wheezing or hemoptysis.   
 
10/18/18 Patient had MRI that suggested small CVA allowing to start Heparin for anticoagulation after clearance from neurology Patient reports stable chronic cough and dyspnea, denies chest pain, wheezing or hemoptysis. Has O2 at home at 2 Lpm and intermittent use. The patient denies abnormal bruising or abnormal bleeding from any body orifices. Review of Systems: 
General ROS: negative for  - fever, chills, weight loss, fatigue and malaise Hematological and Lymphatic ROS: negative for - bleeding problems, jaundice, pallor or swollen lymph nodes Cardiovascular ROS: negative for - chest pain, murmur, orthopnea, palpitations or PND Dermatological ROS: negative for - pruritus, rash or skin lesion changes Latest lactic acid:  
Lactic acid Date Value Ref Range Status 10/15/2018 1.2 0.4 - 2.0 MMOL/L Final  
 
 
Past Medical History: 
Past Medical History:  
Diagnosis Date  Acid reflux  Arthritis  Atrial fibrillation ([facility]  Autoimmune disease ([facility] Sarcoidosis  Colon cancer ([facility]  Diabetes ([facility]  High cholesterol  Hypertension  Sarcoidosis of lung ([facility]  Stroke ([facility] Past Surgical History: 
Past Surgical History:  
Procedure Laterality Date  HX CHOLECYSTECTOMY  HX COLECTOMY Medications: 
Prior to Admission medications Medication Sig Start Date End Date Taking? Authorizing Provider  
escitalopram oxalate (LEXAPRO) 10 mg tablet Take 10 mg by mouth daily. Yes Provider, Historical  
lisinopril-hydroCHLOROthiazide (ZESTORETIC) 20-12.5 mg per tablet Take 1 Tab by mouth daily. Yes Provider, Historical  
clopidogrel (PLAVIX) 75 mg tab Take 75 mg by mouth daily. Yes Provider, Historical  
aspirin (ASPIRIN) 325 mg tablet Take 1 Tab by mouth daily. 10/13/18  Yes Vivian Chou MD  
atorvastatin (LIPITOR) 80 mg tablet Take 1 Tab by mouth daily. 10/13/18  Yes Vivian Chou MD  
fluticasone-vilanterol (BREO ELLIPTA) 100-25 mcg/dose inhaler Take 1 Puff by inhalation daily. 10/13/18  Yes Vivian Chou MD  
metoprolol tartrate (LOPRESSOR) 25 mg tablet Take 0.5 Tabs by mouth every twelve (12) hours. 10/12/18  Yes Vivian Chou MD  
polyethylene glycol [facility]) 17 gram packet Take 1 Packet by mouth daily. 10/12/18  Yes Vivian Chou MD  
montelukast (SINGULAIR) 10 mg tablet Take 10 mg by mouth nightly. Yes Provider, Historical  
ranitidine (ZANTAC) 150 mg tablet Take 150 mg by mouth two (2) times a day. Yes Provider, Historical  
furosemide (LASIX) 20 mg tablet Take  by mouth daily. Yes Provider, Historical  
glipiZIDE (GLUCOTROL) 5 mg tablet Take 5 mg by mouth two (2) times a day. Yes Provider, Historical  
metFORMIN (GLUCOPHAGE) 500 mg tablet Take 500 mg by mouth two (2) times daily (with meals). Yes Provider, Historical  
pantoprazole (PROTONIX) 40 mg tablet Take 40 mg by mouth daily.    Yes Silvestre, MD Misael  
 mometasone (NASONEX) 50 mcg/actuation nasal spray 2 Sprays daily. Provider, Historical  
albuterol (PROVENTIL HFA, VENTOLIN HFA, PROAIR HFA) 90 mcg/actuation inhaler Take 1-2 Puffs by inhalation every six (6) hours as needed for Wheezing. 8/10/16   Arsenio Corona MD  
 
 
Current Facility-Administered Medications Medication Dose Route Frequency  fluticasone-vilanterol (BREO ELLIPTA) 100mcg-25mcg/puff  1 Puff Inhalation DAILY  montelukast (SINGULAIR) tablet 10 mg  10 mg Oral QHS  heparin 25,000 units in D5W 250 ml infusion  18-36 Units/kg/hr IntraVENous TITRATE  famotidine (PF) (PEPCID) 20 mg in sodium chloride 0.9% 10 mL injection  20 mg IntraVENous DAILY  insulin lispro (HUMALOG) injection   SubCUTAneous AC&HS  sodium chloride (NS) flush 5-10 mL  5-10 mL IntraVENous Q8H Allergy: No Known Allergies Social History: 
Social History Tobacco Use  Smoking status: Former Smoker Packs/day: 2.50 Years: 30.00 Pack years: 75.00 Types: Cigarettes  Smokeless tobacco: Never Used Substance Use Topics  Alcohol use: No  
 Drug use: No  
  
 
Family History: 
History reviewed. No family history for emphysema. Objective: 
Vital Signs:   
Blood pressure 126/62, pulse 98, temperature 98.5 °F (36.9 °C), resp. rate 18, height 5' 2\" (1.575 m), weight 92.6 kg (204 lb 3.2 oz), SpO2 94 %. Body mass index is 37.35 kg/m². O2 Device: Nasal cannula O2 Flow Rate (L/min): 4 l/min Temp (24hrs), Av.5 °F (36.9 °C), Min:98 °F (36.7 °C), Max:98.9 °F (37.2 °C) Intake/Output:  
Last shift:      No intake/output data recorded. Last 3 shifts: 10/17 0701 - 10/18 1900 In: -  
Out: 276 [[id number]] Intake/Output Summary (Last 24 hours) at 10/18/2018 2258 Last data filed at 10/18/2018 0600 Gross per 24 hour Intake  Output 600 ml Net -600 ml Physical Exam: 
General: awake, alert, follows commands, no respiratory distress, appears stated age HEENT: PERRL, fundi benign, throat normal without erythema or exudate Neck: No abnormally enlarged lymph nodes or thyroid, supple Chest: normal 
Lungs: moderate air entry, clear to auscultation bilaterally, normal percussion bilaterally, no tenderness/ rash Heart: Regular rate and rhythm, S1S2 present or without murmur or extra heart sounds Abdomen: protuberant, bowel sounds normoactive, tympanic, soft without significant tenderness, masses, organomegaly or guarding, rigidity, rebound Extremity: negative for edema, cyanosis, clubbing Neuro: awake, alert, following commands, speech slurred intermittently Skin: Skin color, texture, turgor fair. Skin dry, warm, non-diaphoretic Data:  
 
Recent Results (from the past 24 hour(s)) PROTHROMBIN TIME + INR Collection Time: 10/18/18  3:15 AM  
Result Value Ref Range Prothrombin time 14.7 11.5 - 15.2 sec INR 1.2 0.8 - 1.2 METABOLIC PANEL, BASIC Collection Time: 10/18/18  3:15 AM  
Result Value Ref Range Sodium 144 136 - 145 mmol/L Potassium 4.2 3.5 - 5.5 mmol/L Chloride 110 (H) 100 - 108 mmol/L  
 CO2 26 21 - 32 mmol/L Anion gap 8 3.0 - 18 mmol/L Glucose 113 (H) 74 - 99 mg/dL BUN 10 7.0 - 18 MG/DL Creatinine 1.19 0.6 - 1.3 MG/DL  
 BUN/Creatinine ratio 8 (L) 12 - 20 GFR est AA 52 (L) >60 ml/min/1.73m2 GFR est non-AA 43 (L) >60 ml/min/1.73m2 Calcium 8.3 (L) 8.5 - 10.1 MG/DL  
CBC WITH AUTOMATED DIFF Collection Time: 10/18/18  3:15 AM  
Result Value Ref Range WBC 6.3 4.6 - 13.2 K/uL  
 RBC 3.51 (L) 4.20 - 5.30 M/uL HGB 7.4 (L) 12.0 - 16.0 g/dL HCT 26.0 (L) 35.0 - 45.0 % MCV 74.1 74.0 - 97.0 FL  
 MCH 21.1 (L) 24.0 - 34.0 PG  
 MCHC 28.5 (L) 31.0 - 37.0 g/dL RDW 28.7 (H) 11.6 - 14.5 % PLATELET 769 883 - 546 K/uL MPV 9.2 9.2 - 11.8 FL  
 NEUTROPHILS 76 (H) 42 - 75 % BAND NEUTROPHILS 1 0 - 5 % LYMPHOCYTES 15 (L) 20 - 51 % MONOCYTES 5 2 - 9 % EOSINOPHILS 2 0 - 5 % BASOPHILS 1 0 - 3 % ABS. NEUTROPHILS 4.8 1.8 - 8.0 K/UL  
 ABS. LYMPHOCYTES 0.9 0.8 - 3.5 K/UL  
 ABS. MONOCYTES 0.3 0 - 1.0 K/UL  
 ABS. EOSINOPHILS 0.1 0.0 - 0.4 K/UL  
 ABS. BASOPHILS 0.1 0.0 - 0.1 K/UL  
 RBC COMMENTS ANISOCYTOSIS 2+ 
    
 RBC COMMENTS POLYCHROMASIA 1+ 
    
 RBC COMMENTS MICROCYTOSIS 2+ 
    
 RBC COMMENTS HYPOCHROMIA 1+ 
    
 RBC COMMENTS OVALOCYTES 1+ 
    
 DF MANUAL    
PTT Collection Time: 10/18/18  3:15 AM  
Result Value Ref Range aPTT >180.0 (HH) 23.0 - 36.4 SEC GLUCOSE, POC Collection Time: 10/18/18  6:20 AM  
Result Value Ref Range Glucose (POC) 124 (H) 70 - 110 mg/dL GLUCOSE, POC Collection Time: 10/18/18 12:20 PM  
Result Value Ref Range Glucose (POC) 138 (H) 70 - 110 mg/dL GLUCOSE, POC Collection Time: 10/18/18  4:36 PM  
Result Value Ref Range Glucose (POC) 159 (H) 70 - 110 mg/dL GLUCOSE, POC Collection Time: 10/18/18 10:02 PM  
Result Value Ref Range Glucose (POC) 121 (H) 70 - 110 mg/dL No results for input(s): FIO2I, IFO2, HCO3I, IHCO3, HCOPOC, PCO2I, PCOPOC, IPHI, PHI, PHPOC, PO2I, PO2POC in the last 72 hours. No lab exists for component: IPOC2 All Micro Results Procedure Component Value Units Date/Time CULTURE, BLOOD [[id number]] Collected:  10/15/18 1105 Order Status:  Completed Specimen:  Blood Updated:  10/18/18 0734 Special Requests: NO SPECIAL REQUESTS Culture result: NO GROWTH 3 DAYS     
 CULTURE, BLOOD [[id number]] Collected:  10/15/18 1147 Order Status:  Completed Specimen:  Blood Updated:  10/18/18 0734 Special Requests:  LEFT AC  
  Culture result: NO GROWTH 3 DAYS Telemetry: normal sinus rhythm 9/30/18 ECHO · Estimated left ventricular ejection fraction is 66 - 70%. Left ventricular mild concentric hypertrophy. Normal left ventricular wall motion, no regional wall motion abnormality noted. Mild (grade 1) left ventricular diastolic dysfunction E/E' ratio is 12. Kunal Counter · Mitral valve non-specific thickening. Mild mitral valve regurgitation. · Mild tricuspid valve regurgitation is present. Pulmonary arterial systolic pressure is 35 mmHg. Mild pulmonary hypertension is present. · Mild pulmonic valve regurgitation is present. · Saline contrast was given to evaluate for intracardiac shunt. Right to left shunt seen at rest. 
 
Imaging: 
[x]I have personally reviewed the patients chest radiographs images and report Results from Hospital Encounter encounter on 10/15/18 XR CHEST PORT Narrative A portable AP radiograph of the chest was obtained at 0930 hours: 
INDICATION:  Meets SIRS criteria. COMPARISON:  Multiple prior studies most recent being 10/10/2018. FINDINGS:  
  
Heart and mediastinum: Unremarkable. Lungs and pleura: Lungs are hypoinflated. No definite consolidation or pleural 
effusion is seen. Aorta: Unremarkable. Bones: Within normal limits for age. Other: None. Impression Impression: Hypoinflation. PA and lateral views suggested when feasible to exclude any 
bibasilar atelectasis or pleural effusion. Results from Hospital Encounter encounter on 10/15/18 CT HEAD WO CONT Narrative CT head without contrast 
 
INDICATION: Slurred speech, altered mental status. COMPARISON: CT head 10/15/2018. TECHNIQUE: Axial CT imaging of the head was performed without intravenous 
contrast. One or more dose reduction techniques were used on this CT: automated 
exposure control, adjustment of the mAs and/or kVp according to patient size, 
and iterative reconstruction techniques. The specific techniques used on this CT exam have been documented in the patient's electronic medical record. 
 
_______________ FINDINGS: 
 
BRAIN AND POSTERIOR FOSSA: There is mild diffuse peripheral atrophy with 
prominence of the sylvian fissures, the cisterns and the sulci pattern without 
midline shift or mass effect.  There is nonspecific periventricular and 
 subcortical hypodensities, most probably chronic small vessel ischemia. Again 
noted is chronic infarcts involving bilateral frontal lobes, left frontoparietal 
white matter and left frontal parasylvian cortex and bilateral cerebellar 
hemispheres. Multi foci of infarct seen on MRI 10/17/2018 are not as conspicuous 
by CT. There is no evidence of any hemorrhage, acute infarct, or abnormal fluid 
collection.] EXTRA-AXIAL SPACES AND MENINGES: There are no abnormal extra-axial fluid 
collections. CALVARIUM: Intact. SINUSES: Clear. OTHER: Carotid atherosclerotic calcifications noted. _______________ Impression IMPRESSION: 
 
1. No evidence for acute infarct, hemorrhage, or mass effect. Several small foci 
of infarct seen on MRI yesterday are not conspicuous by CT. No evidence for 
hemorrhage or mass effect. 2. Mild atrophy demonstrating nonspecific white matter hypodensities, likely 
chronic microvascular disease. Chronic infarcts bilateral frontal lobes, left 
frontal parasylvian cortex, and bilateral cerebellar hemispheres. Pro [place] [x]See my orders for details My assessment, plan of care, findings, medications, side effects etc were discussed with: 
[x]nursing [x]Armando Su  
[]respiratory therapy []Dr. Sarah Sharma  
[x]family [x]Patient [x]High complexity decision making performed and > 50% time spent in face to face evaluation. Late entry note [3:15 pm-3:55 pm] Melisa Curry MD

## 2018-10-19 NOTE — PROGRESS NOTES
Hospitalist Progress Note Patient: Taurus Márquez MRN: [id number]  CSN: [id number] YOB: 1932  Age: 80 y.o. Sex: female DOA: 10/15/2018 LOS:  LOS: 4 days Chief Complaint: CVA Assessment/Plan 1. Acute Ischemic Stroke 2. Acute Pulmonary Embolism 3. Left Popliteal DVT 4. S/P Colectomy 5. Chronic Respiratory Failure 6. Anemia 1. Patient has been seen and followed by neurology, who signed off on her care today. EEG yesterday was unremarkable for any seizure like activity. Per neurology, ok to start warfarin or eliquis for treatment of DVT/PE. Eliquis started today, 10mg BID initially. Will stop heparin drip as eliquis has been initiated. 2. As above, will treat with Eliquis. 3. As above, will treat with Eliquis. 4. Pain control with NSAIDs. Avoid narcotics. 5. Patient is on her usual amount of oxygen. 6. H/H stable. Will monitor as now on Eliquis. Thorough discussion with the patient's Daughter, Rodney Mckenzie, who stated that the plan is to take the patient home with hospice care. Case management to assist in arranging this. Plan is for discharge tomorrow with home hospice. Disposition - Home with Hospice, tomorrow. Patient Active Problem List  
Diagnosis Code  Dyspnea R06.00  Troponin level elevated R74.8  Wheezing R06.2  Sarcoidosis D86.9  Diabetes ([address] E11.9  Hypoxia R09.02  Stroke ([facility] I63.9  Syncope R55  Chronic respiratory failure with hypoxia, on home O2 therapy (HCC) J96.11, Z99.81  
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  
 Colon cancer ([facility] [address] C18.9  Acute pulmonary embolism (HCC) I26.99  
 Deep vein thrombosis (DVT) of popliteal vein of left lower extremity (HCC) R64.323 Subjective: No complaints this AM. Slept \"alright\". Review of systems: 
 
Constitutional: denies fevers, chills, myalgias Respiratory: denies SOB, cough Cardiovascular: denies chest pain, palpitations Gastrointestinal: denies nausea, vomiting, diarrhea Vital signs/Intake and Output: 
Visit Vitals /79 Pulse 94 Temp 98.2 °F (36.8 °C) Resp 18 Ht 5' 2\" (1.575 m) Wt 92.6 kg (204 lb 3.2 oz) SpO2 100% BMI 37.35 kg/m² Current Shift:  10/19 0701 - 10/19 1900 In: 150.1 [I.V.:150.1] Out: 900 [Urine:900] Last three shifts:  10/17 1901 - 10/19 0700 In: 0 Out: 600 [Urine:600] Exam: 
 
General: Elderly, chronically ill appearing AA female, alert, NAD, OX3 Head/Neck: NCAT, supple, No masses, No lymphadenopathy CVS:Regular rate and rhythm, no M/R/G, S1/S2 heard, no thrill Lungs:Clear to auscultation bilaterally, no wheezes, rhonchi, or rales Abdomen: Soft, Nontender, No distention, Normal Bowel sounds, No hepatomegaly Extremities: No C/C/E, pulses palpable 2+ Skin:normal texture and turgor, no rashes, no lesions Neuro: Some facial weakness, otherwise grossly normal , follows commands Psych:appropriate Labs: Results:  
   
Chemistry Recent Labs 10/19/18 
0302 10/18/18 
0315 10/17/18 
0944 * 113* 118* * 144 145  
K 4.2 4.2 4.2 * 110* 111* CO2 29 26 27 BUN 9 10 11 CREA 1.12 1.19 1.08  
CA 8.4* 8.3* 8.2* AGAP 6 8 7 BUCR 8* 8* 10* CBC w/Diff Recent Labs 10/19/18 
0302 10/18/18 
0315 10/17/18 2015 WBC 6.2 6.3 6.5  
RBC 3.27* 3.51* 3.54* HGB 7.0* 7.4* 7.4* HCT 24.4* 26.0* 26.0*  
 348 323 GRANS 73 76* 70  
LYMPH 18* 15* 18* EOS 2 2 2 Cardiac Enzymes No results for input(s): CPK, CKND1, YESENIA in the last 72 hours. No lab exists for component: Elisa Tang Coagulation Recent Labs 10/19/18 
1036 10/19/18 
0302  10/18/18 
0315 PTP  --  14.0  --  14.7 INR  --  1.1  --  1.2 APTT 78.3* 130.6*   < > >180.0*  
 < > = values in this interval not displayed. Lipid Panel Lab Results Component Value Date/Time Cholesterol, total 106 10/16/2018 05:04 AM  
 HDL Cholesterol 32 (L) 10/16/2018 05:04 AM  
 LDL, calculated 50.6 10/16/2018 05:04 AM  
 VLDL, calculated 23.4 10/16/2018 05:04 AM  
 Triglyceride 117 10/16/2018 05:04 AM  
 CHOL/HDL Ratio 3.3 10/16/2018 05:04 AM  
  
BNP No results for input(s): BNPP in the last 72 hours. Liver Enzymes No results for input(s): TP, ALB, TBIL, AP, SGOT, GPT in the last 72 hours. No lab exists for component: DBIL Thyroid Studies No results found for: T4, T3U, TSH, TSHEXT Procedures/imaging: see electronic medical records for all procedures/Xrays and details which were not copied into this note but were reviewed prior to creation of Plan Kate Downing PA-C

## 2018-10-19 NOTE — PROGRESS NOTES
PULMONARY CRITICAL CARE MEDICINE 
ICU PROGRESS NOTE: 
10/19/2018 Seen in follow up of Post-op dyspnea. Jone Cancer Interval History:  Has PE, to be transitioned to oral anti-coagulant. Assessment:  
 
Principal Problem: 
  Stroke ([address] (9/30/2018) Active Problems: 
  Diabetes ([address] () Chronic respiratory failure with hypoxia, on home O2 therapy ([address] (9/30/2018) HTN (hypertension) (9/30/2018) Sarcoidosis of lung ([address] (10/4/2018) Acute pulmonary embolism ([address] (10/17/2018) Deep vein thrombosis (DVT) of popliteal vein of left lower extremity ([address] (10/17/2018) Anemia ILD/COPD Pulmonary hypertension Colon CA; s/p resection Plan: PLANS FOR ABOVE PROBLEMS: 
1. Transition to oral anticoagulant 2. PFTs as outpatient 3. PT 
4.  bleeding precatuiions 5. Wound care and fall precautions 6. Bronchodilators as required Activity As tolerated Disposition and Family Home Subjective: No CP or SOB. No hemoptysis. No orthopnea. Review of Systems See H&P Objective:  
 
Visit Vitals /79 Pulse 94 Temp 98.2 °F (36.8 °C) Resp 18 Ht 5' 2\" (1.575 m) Wt 92.6 kg (204 lb 3.2 oz) SpO2 100% BMI 37.35 kg/m² PA- /; CVP- ; CI-   
DRIPS: 
 
 
Intake/Output Summary (Last 24 hours) at 10/19/2018 1345 Last data filed at 10/19/2018 1332 Gross per 24 hour Intake 270.14 ml Output 900 ml Net -629.86 ml Physical Exam: 
Awake, alert, NAD Skin warm and dry Neck supple, no JVD Chest good aeration bilaterally Heart RSR 
abdomen soft, BS active, dress+ Legs no gross edema Speech coherent Ventilator Settings:  
Reviewed Ventilator Flowsheet:   
PAP:       
Wedge:   
CVP:       
CO:         
CI:           
SVR:       
PVR:       
 
  
  
 
Prophylactic Measures: 
Vt/IBW =  
Glucose: DVT: Drug - Compression Stockings SUP: Yes 
HOB > 30 degrees: Yes Spontaneous Awakening Trial: Yes Spontaneous Breathing Trial: Yes 
Nutrition: Yes 
 
 Data Review:  
Reviewed available LABS since my last time stamp. Recent Results (from the past 24 hour(s)) GLUCOSE, POC Collection Time: 10/18/18  4:36 PM  
Result Value Ref Range Glucose (POC) 159 (H) 70 - 110 mg/dL PTT Collection Time: 10/18/18  8:50 PM  
Result Value Ref Range aPTT 30.1 23.0 - 36.4 SEC GLUCOSE, POC Collection Time: 10/18/18 10:02 PM  
Result Value Ref Range Glucose (POC) 121 (H) 70 - 110 mg/dL PROTHROMBIN TIME + INR Collection Time: 10/19/18  3:02 AM  
Result Value Ref Range Prothrombin time 14.0 11.5 - 15.2 sec INR 1.1 0.8 - 1.2 METABOLIC PANEL, BASIC Collection Time: 10/19/18  3:02 AM  
Result Value Ref Range Sodium 146 (H) 136 - 145 mmol/L Potassium 4.2 3.5 - 5.5 mmol/L Chloride 111 (H) 100 - 108 mmol/L  
 CO2 29 21 - 32 mmol/L Anion gap 6 3.0 - 18 mmol/L Glucose 112 (H) 74 - 99 mg/dL BUN 9 7.0 - 18 MG/DL Creatinine 1.12 0.6 - 1.3 MG/DL  
 BUN/Creatinine ratio 8 (L) 12 - 20 GFR est AA 56 (L) >60 ml/min/1.73m2 GFR est non-AA 46 (L) >60 ml/min/1.73m2 Calcium 8.4 (L) 8.5 - 10.1 MG/DL  
CBC WITH AUTOMATED DIFF Collection Time: 10/19/18  3:02 AM  
Result Value Ref Range WBC 6.2 4.6 - 13.2 K/uL  
 RBC 3.27 (L) 4.20 - 5.30 M/uL HGB 7.0 (L) 12.0 - 16.0 g/dL HCT 24.4 (L) 35.0 - 45.0 % MCV 74.6 74.0 - 97.0 FL  
 MCH 21.4 (L) 24.0 - 34.0 PG  
 MCHC 28.7 (L) 31.0 - 37.0 g/dL RDW 28.7 (H) 11.6 - 14.5 % PLATELET 192 344 - 121 K/uL MPV 9.3 9.2 - 11.8 FL  
 NEUTROPHILS 73 40 - 73 % LYMPHOCYTES 18 (L) 21 - 52 % MONOCYTES 7 3 - 10 % EOSINOPHILS 2 0 - 5 % BASOPHILS 0 0 - 2 %  
 ABS. NEUTROPHILS 4.6 1.8 - 8.0 K/UL  
 ABS. LYMPHOCYTES 1.1 0.9 - 3.6 K/UL  
 ABS. MONOCYTES 0.4 0.05 - 1.2 K/UL  
 ABS. EOSINOPHILS 0.1 0.0 - 0.4 K/UL  
 ABS. BASOPHILS 0.0 0.0 - 0.1 K/UL  
 RBC COMMENTS ANISOCYTOSIS 2+ 
    
 RBC COMMENTS MICROCYTOSIS 1+ 
    
 RBC COMMENTS POLYCHROMASIA 1+ 
    
 RBC COMMENTS OVALOCYTES 
 1+ 
    
 RBC COMMENTS SPHEROCYTES 
FEW 
SCHISTOCYTES 
FEW 
    
 DF AUTOMATED    
PTT Collection Time: 10/19/18  3:02 AM  
Result Value Ref Range aPTT 130.6 (H) 23.0 - 36.4 SEC GLUCOSE, POC Collection Time: 10/19/18  6:27 AM  
Result Value Ref Range Glucose (POC) 110 70 - 110 mg/dL PTT Collection Time: 10/19/18 10:36 AM  
Result Value Ref Range aPTT 78.3 (H) 23.0 - 36.4 SEC GLUCOSE, POC Collection Time: 10/19/18 11:39 AM  
Result Value Ref Range Glucose (POC) 150 (H) 70 - 110 mg/dL Microbiology: 
 
 
Radiology: No results found. Stable. Janay Jo [place] [id number] [address] [facility] 
[phone]

## 2018-10-19 NOTE — PROGRESS NOTES
Assumed responsibility for patient from Bryan Molina RN 
 
1226 Heparin Drip discontinued and Eliquis given 2023 Bedside shift change report given to Anusha Birmingham RN (oncoming nurse) by Thalia Madrigal RN (offgoing nurse). Report included the following information SBAR, Kardex and MAR.

## 2018-10-19 NOTE — PROGRESS NOTES
Problem: Falls - Risk of 
Goal: *Absence of Falls Document Jonatan Shadow Fall Risk and appropriate interventions in the flowsheet. Outcome: Progressing Towards Goal 
Fall Risk Interventions: 
Mobility Interventions: Communicate number of staff needed for ambulation/transfer Mentation Interventions: Bed/chair exit alarm, Door open when patient unattended Medication Interventions: Bed/chair exit alarm, Evaluate medications/consider consulting pharmacy, Patient to call before getting OOB Elimination Interventions: Call light in reach, Bed/chair exit alarm, Patient to call for help with toileting needs

## 2018-10-19 NOTE — PROGRESS NOTES
DC Plan:  Discharge home with [facility] tomorrow [phone]. Please page CM on call thru  for assistance Chart reviewed. Spoke with provider Otis DORMAN, plan is to dc pt with hospice tomorrow. Message left for pts daughterAbdoul [phone] to discuss transition of care plan. [address] Met with pt and pts family at bedside, Ms Abdoul Elaine not present. Discussed transition of care plan. Hospice agency list provided. Family is still trying to decide which home address pt will discharge to with hospice. Family will discuss hospice agency and contact CM with decision. Jhonatan Met with daughter, Abdoul Elaine [phone] to discuss discharge plan. Pt will dc to her home, address confirmed per face sheet. Will place referral will [facility]. [address] Spoke with rona Lamar again about discharge plan. She has spoke with [facility] and has meeting arranged with them tonight. If DME can be delivered tomorrow, pt can dc tomorrow and Ms. Silvestre aware. Care Management Interventions PCP Verified by CM: Yes Physical Therapy Consult: Yes Occupational Therapy Consult: Yes Speech Therapy Consult: Yes Current Support Network: Family Lives [place] Confirm Follow Up Transport: Family Plan discussed with Pt/Family/Caregiver: Yes Freedom of Choice Offered:  Yes

## 2018-10-19 NOTE — PROGRESS NOTES
Problem: Falls - Risk of 
Goal: *Absence of Falls Document Mauricio Avitia Fall Risk and appropriate interventions in the flowsheet. Outcome: Progressing Towards Goal 
Fall Risk Interventions: 
Mobility Interventions: Communicate number of staff needed for ambulation/transfer Mentation Interventions: Bed/chair exit alarm, Door open when patient unattended Medication Interventions: Bed/chair exit alarm, Evaluate medications/consider consulting pharmacy, Patient to call before getting OOB Elimination Interventions: Call light in reach, Bed/chair exit alarm, Patient to call for help with toileting needs Problem: Pressure Injury - Risk of 
Goal: *Prevention of pressure injury Document Tavares Scale and appropriate interventions in the flowsheet. Outcome: Progressing Towards Goal 
Pressure Injury Interventions: 
Sensory Interventions: Assess changes in LOC Moisture Interventions: Apply protective barrier, creams and emollients, Absorbent underpads Activity Interventions: Pressure redistribution bed/mattress(bed type), PT/OT evaluation Mobility Interventions: PT/OT evaluation, Pressure redistribution bed/mattress (bed type) Nutrition Interventions: Document food/fluid/supplement intake Friction and Shear Interventions: HOB 30 degrees or less, Apply protective barrier, creams and emollients, Lift sheet

## 2018-10-19 NOTE — PROGRESS NOTES
Assumed responsibility for patient from Maria E Tom, RN 
 
[address] [facility] and PT at bedside. Patient had sob episode during pt. Otis and Dr. Tadeo Michaels ordered Heparin gtt stopped. 1046 Heparin gtt stopped. 1700 This nurse spoke with LAKIA Schulte who asked to have heparin gtt restarted. 1830 EEG at bedside. 1900 Epic unavailable. Could not get heparin gtt restarted due to computer being down. Night shift will resume when computers come back on line

## 2018-10-20 NOTE — PROGRESS NOTES
Call received from [facility] spoke with Carlos Townsend, informed cm that equipment delivery time 4pm,cm updated bedside nurse Lino De La Vega of equipment delivery time of 4pm and to arrange medical transport,patients sister Mrs. Silvestre will need to know transport time, please update cm once medical transport time finalized.

## 2018-10-20 NOTE — ROUTINE PROCESS
Verbal shift change report given to Aminah Noyola (oncoming nurse) by Macario Santiago RN 
 (offgoing nurse). Report included the following information SBAR, Kardex, ED Summary, OR Summary, Procedure Summary, Intake/Output, MAR, Recent Results and Med Rec Status.

## 2018-10-20 NOTE — PROGRESS NOTES
Call received from staff member Ifeoma Fregoso informed cm of medical transport time for 5pm, please provide pt with hard script pain medication with family and discharge paperwork,cm also notified patients sister Mrs. Silvestre [phone] of equipment delivery time of 4pm to home and medical transport time of 5pm, Mrs silvestre stated she wanted to go over patients medications cm informed sister that usually hospice patients medications/care are all under the hospice physician care and the hospital discharge physician usually orders one time pain medication and something for anxiety, was advised to contact unit [phone] ask for her mothers nurse regarding medical concerns but she can request to speak with cm anytime until mother leaves regarding discharge plan arrangements,tristen stated she will be calling unit,cm will remain available if needed.

## 2018-10-20 NOTE — DISCHARGE INSTRUCTIONS
DISCHARGE SUMMARY from Nurse    PATIENT INSTRUCTIONS:      Report the following to your surgeon:  · Excessive pain, swelling, redness or odor of or around the surgical area  · Temperature over 100.5  · Nausea and vomiting lasting longer than 4 hours or if unable to take medications  · Any signs of decreased circulation or nerve impairment to extremity: change in color, persistent  numbness, tingling, coldness or increase pain  · Any questions    *  Please give a list of your current medications to your Primary Care Provider. *  Please update this list whenever your medications are discontinued, doses are      changed, or new medications (including over-the-counter products) are added. *  Please carry medication information at all times in case of emergency situations. These are general instructions for a healthy lifestyle:    No smoking/ No tobacco products/ Avoid exposure to second hand smoke  Surgeon General's Warning:  Quitting smoking now greatly reduces serious risk to your health. Obesity, smoking, and sedentary lifestyle greatly increases your risk for illness    A healthy diet, regular physical exercise & weight monitoring are important for maintaining a healthy lifestyle    You may be retaining fluid if you have a history of heart failure or if you experience any of the following symptoms:  Weight gain of 3 pounds or more overnight or 5 pounds in a week, increased swelling in our hands or feet or shortness of breath while lying flat in bed. Please call your doctor as soon as you notice any of these symptoms; do not wait until your next office visit. Recognize signs and symptoms of STROKE:    F-face looks uneven    A-arms unable to move or move unevenly    S-speech slurred or non-existent    T-time-call 911 as soon as signs and symptoms begin-DO NOT go       Back to bed or wait to see if you get better-TIME IS BRAIN.     Warning Signs of HEART ATTACK     Call 911 if you have these symptoms:   Chest discomfort. Most heart attacks involve discomfort in the center of the chest that lasts more than a few minutes, or that goes away and comes back. It can feel like uncomfortable pressure, squeezing, fullness, or pain.  Discomfort in other areas of the upper body. Symptoms can include pain or discomfort in one or both arms, the back, neck, jaw, or stomach.  Shortness of breath with or without chest discomfort.  Other signs may include breaking out in a cold sweat, nausea, or lightheadedness. Don't wait more than five minutes to call 911 - MINUTES MATTER! Fast action can save your life. Calling 911 is almost always the fastest way to get lifesaving treatment. Emergency Medical Services staff can begin treatment when they arrive -- up to an hour sooner than if someone gets to the hospital by car. The discharge information has been reviewed with the patient. The patient verbalized understanding. Discharge medications reviewed with the patient and appropriate educational materials and side effects teaching were provided.     Patient armband removed and shredded        ___________________________________________________________________________________________________________________________________

## 2018-10-20 NOTE — PROGRESS NOTES
Tomasz called [facility] [phone] for update on medical equipment spoke with Maria E Griggs at this time informed cm no available time yet but will call once available.

## 2018-10-20 NOTE — PROGRESS NOTES
Return call received from [address] informed cm that pt has been accepted she has not received admitting qualified diagnosis from [facility] physician yet which will not hold up services, stated equipment has not been ordered yet because she was not aware of where pt will be receiving hospice services which information had been documented in unit cm note on yesterday at 1550, cm also spoke with  today to verify as well once confirmation confirmed of address cm contacted [address] stated she will place equipment order cm requested that cm be notified once equipment is delivered so unit can arrange medical transportation to address on patients face sheet also Mrs. Silvestre would also need to be notified of approx delivery time of equipment so she can make arrangements to have someone in the home to accept, please contact cm with any further concerns.

## 2018-10-20 NOTE — DISCHARGE SUMMARY
Discharge Summary Subjective:  
 
 
Admit Date: 10/15/2018 Discharge Date:  10/20/2018, 8:39 AM 
 
Discharging Physician: Oscar Ramon pager [phone] Primary Care Provider:  Lord Karl MD 
 
Patient ID: Maia Lozano 
80 y.o. female 9/17/1932 Reason for Admission:  Stroke [facility]) Discharge Diagnosis: 1. Acute Ischemic Stroke 2. Acute Pulmonary Embolism 3. Left Popliteal DVT 4. S/P Colectomy 5. Chronic Respiratory Failure 6. Anemia  
 
 
Patient Active Problem List  
Diagnosis Code  Dyspnea R06.00  Troponin level elevated R74.8  Wheezing R06.2  Sarcoidosis D86.9  Diabetes ([facility] [address] E11.9  Hypoxia R09.02  Stroke ([facility] [address] I63.9  Syncope R55  Chronic respiratory failure with hypoxia, on home O2 therapy (HCC) J96.11, Z99.81  
 HTN (hypertension) I10  Type II diabetes mellitus, uncontrolled ([facility] [address] E11.65  Severe anemia D64.9  Sarcoidosis of lung ([facility] [address] D86.0  
 Colon cancer ([facility] [address] C18.9  Acute pulmonary embolism (HCC) I26.99  
 Deep vein thrombosis (DVT) of popliteal vein of left lower extremity (HCC) C40.732 Consultants:   
neurology Pulmonology Hospital Course:  
Reason for admission ( Admitting HPI): \"  
Evelyn Mathur is a 80 y.o. female who has past history of sarcoidosis, chronic respiratory failure, colon ca, DM, HTN, recent CVA and recent colectomy is brought to ER from [address] [place] [address] with concerns of Aphasia. Patient not able to provide any reliable history, daughter reports that she went to see her patient last night and she bathe her. Later she noticed patient mumbling but not making any sense. This morning patient will not speak and when she tried to stand patient she would lean on side. EMS called and patient brought to ER.  She was recently discharged from this hospital. She was admitted for syncope, work up showed MRI findings of Possible tiny area of acute right parietal infarction, evident on diffusion only without hemorrhage or mass effect. She was also noted to be anemic, colonoscopy showed colon ca. 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 She was discharged on aspirin and plavix. In ER code S called. CT head Evidence of atrophy and advanced chronic microvascular changes mainly in the deep white matter. No evidence of an acute intracranial process seen. No hemorrhage, definite acute 
infarct, or mass effect noted. No significant change noted since 9/30/2018\" She was admitted to the hospitalist service, treated w anticoagulation and seen by neurology and pulmonology. She is currently bed bound, dependent for all ADL care. Goals of care were discussed and she is to DC today with hospice services at home. Pertinent Imaging/ Operative procedures: MRI brain:\" 1.  Several small foci of acute infarct involving the left frontal cortex, left  
insular cortex, left occipital lobe, and right cerebellar hemisphere. Probable  
additional small subacute punctate left frontal cortical infarcts.  No evidence  
of associated hemorrhage or mass effect. Given distribution, suspect central  
embolic origin. Clinical correlation is recommended. 2. Stable chronic infarcts including bilateral frontal lobes, left  
frontoparietal subcortical white matter, left frontal perisylvian cortex, and  
bilateral cerebellar hemispheres. 3. Stable, moderate nonspecific white matter disease likely representing chronic  
small vessel changes. :  
   
 
 
 
Pertinent Results:   
CMP:  
Lab Results Component Value Date/Time   10/20/2018 06:00 AM  
 K 4.1 10/20/2018 06:00 AM  
  (H) 10/20/2018 06:00 AM  
 CO2 30 10/20/2018 06:00 AM  
 AGAP 3 10/20/2018 06:00 AM  
  (H) 10/20/2018 06:00 AM  
 BUN 9 10/20/2018 06:00 AM  
 CREA 1.12 10/20/2018 06:00 AM  
 GFRAA 56 (L) 10/20/2018 06:00 AM  
 GFRNA 46 (L) 10/20/2018 06:00 AM  
 CA 8.5 10/20/2018 06:00 AM  
 
CBC:  
Lab Results Component Value Date/Time WBC 6.0 10/20/2018 06:00 AM  
 HGB 6.9 (L) 10/20/2018 06:00 AM  
 HCT 24.8 (L) 10/20/2018 06:00 AM  
  10/20/2018 06:00 AM  
 
 
 
Physical Exam on Discharge: 
Visit Vitals /55 (BP 1 Location: Right arm, BP Patient Position: At rest;Supine; Head of bed elevated (Comment degrees)) Pulse 65 Temp 98.6 °F (37 °C) Resp 14 Ht 5' 2\" (1.575 m) Wt 92.6 kg (204 lb 3.2 oz) SpO2 100% BMI 37.35 kg/m² Body mass index is 37.35 kg/m². GEN: chronically ill appearing HEART:S1 S2 
NEURO: nonfocal  
Condition at discharge: stable Discharge Medications Current Discharge Medication List  
  
START taking these medications Details  
apixaban (ELIQUIS) 5 mg tablet Take 2 Tabs by mouth two (2) times a day. Qty: 60 Tab, Refills: 0  
  
morphine 20 mg/5 mL (4 mg/mL) solution Take 1.25 mL by mouth every two (2) hours as needed for Pain. Max Daily Amount: 60 mg. 
Qty: 30 mL, Refills: 0 Associated Diagnoses: Colonic mass LORazepam (INTENSOL) 2 mg/mL concentrated solution Take 0.5 mL by mouth every eight (8) hours as needed for Anxiety. Max Daily Amount: 3 mg. Qty: 30 mL, Refills: 0 Associated Diagnoses: Weakness CONTINUE these medications which have NOT CHANGED Details  
escitalopram oxalate (LEXAPRO) 10 mg tablet Take 10 mg by mouth daily. fluticasone-vilanterol (BREO ELLIPTA) 100-25 mcg/dose inhaler Take 1 Puff by inhalation daily. Qty: 1 Inhaler, Refills: 1  
  
polyethylene glycol (MIRALAX) 17 gram packet Take 1 Packet by mouth daily. Qty: 1 Packet, Refills: 0  
  
montelukast (SINGULAIR) 10 mg tablet Take 10 mg by mouth nightly. ranitidine (ZANTAC) 150 mg tablet Take 150 mg by mouth two (2) times a day. pantoprazole (PROTONIX) 40 mg tablet Take 40 mg by mouth daily.   
  
albuterol (PROVENTIL HFA, VENTOLIN HFA, PROAIR HFA) 90 mcg/actuation inhaler Take 1-2 Puffs by inhalation every six (6) hours as needed for Wheezing. Qty: 1 Inhaler, Refills: 3 Associated Diagnoses: Wheezing STOP taking these medications  
  
 lisinopril-hydroCHLOROthiazide (ZESTORETIC) 20-12.5 mg per tablet Comments:  
Reason for Stopping:   
   
 clopidogrel (PLAVIX) 75 mg tab Comments:  
Reason for Stopping:   
   
 aspirin (ASPIRIN) 325 mg tablet Comments:  
Reason for Stopping:   
   
 atorvastatin (LIPITOR) 80 mg tablet Comments:  
Reason for Stopping:   
   
 metoprolol tartrate (LOPRESSOR) 25 mg tablet Comments:  
Reason for Stopping:   
   
 furosemide (LASIX) 20 mg tablet Comments:  
Reason for Stopping:   
   
 glipiZIDE (GLUCOTROL) 5 mg tablet Comments:  
Reason for Stopping:   
   
 metFORMIN (GLUCOPHAGE) 500 mg tablet Comments:  
Reason for Stopping:   
   
 mometasone (NASONEX) 50 mcg/actuation nasal spray Comments:  
Reason for Stopping:   
   
  
 
 
Disposition: home w hospice services Follow up:  none Restrictions: none Diagnostic Imaging/Labs pending at discharge: none Kayla Carrel, DO [address] 1947 [facility] Internal Medicine/Geriatrics Time Spent 45 minutes with >50% coordination of care CC: Tamiko Messer MD

## 2018-10-20 NOTE — HOSPICE
Hospice meeting:  Met with patient and family to provide information about hospice:  Goals of care at home,services, equipment and medications provided. All questions answered. Patient will need hospital bed, and over bed table ordered when discharge plans finalized. Primary facilitity nurse notified of above. Please call hospice [phone] with address to deliver equipment.   Thank you so much for this referral.

## 2018-10-20 NOTE — PROGRESS NOTES
[id number] Received report and assumed pt care from [facility], RN. Pt awake with family at bedside. Please refer to flowsheets for assessment.

## 2018-10-20 NOTE — PROGRESS NOTES
Page received from  Dr. Constantine Burkett regarding Hospice services stated he was informed by family member that pt was not ready because she wasn't accepted, cm called [facility] [phone] for clarification waiting on return call after call will speak with family.

## 2018-10-30 ENCOUNTER — HOME CARE VISIT (OUTPATIENT)
Dept: HOSPICE | Facility: HOSPICE | Age: 83
End: 2018-10-30
Payer: MEDICARE

## 2018-11-04 LAB
ATRIAL RATE: 105 BPM
CALCULATED P AXIS, ECG09: 71 DEGREES
CALCULATED R AXIS, ECG10: 48 DEGREES
CALCULATED T AXIS, ECG11: 44 DEGREES
DIAGNOSIS, 93000: NORMAL
P-R INTERVAL, ECG05: 160 MS
Q-T INTERVAL, ECG07: 330 MS
QRS DURATION, ECG06: 76 MS
QTC CALCULATION (BEZET), ECG08: 436 MS
VENTRICULAR RATE, ECG03: 105 BPM

## 2018-11-24 LAB
ATRIAL RATE: 99 BPM
CALCULATED P AXIS, ECG09: 51 DEGREES
CALCULATED R AXIS, ECG10: 103 DEGREES
CALCULATED T AXIS, ECG11: 39 DEGREES
DIAGNOSIS, 93000: NORMAL
P-R INTERVAL, ECG05: 154 MS
Q-T INTERVAL, ECG07: 358 MS
QRS DURATION, ECG06: 116 MS
QTC CALCULATION (BEZET), ECG08: 459 MS
VENTRICULAR RATE, ECG03: 99 BPM

## 2018-11-25 LAB
ATRIAL RATE: 95 BPM
CALCULATED P AXIS, ECG09: 55 DEGREES
CALCULATED R AXIS, ECG10: 94 DEGREES
CALCULATED T AXIS, ECG11: 4 DEGREES
DIAGNOSIS, 93000: NORMAL
P-R INTERVAL, ECG05: 148 MS
Q-T INTERVAL, ECG07: 386 MS
QRS DURATION, ECG06: 122 MS
QTC CALCULATION (BEZET), ECG08: 485 MS
VENTRICULAR RATE, ECG03: 95 BPM

## 2020-09-05 NOTE — PROGRESS NOTES
NEUROLOGY PROGRESS NOTE Patient: Leah Godinez        Sex: female          DOA: 10/15/2018 YOB: 1932      Age:  80 y.o.         LOS: 2 days Identification: 
[id number] -American female presents with sudden onset of right facial weakness, right arm and leg weakness as well as aphasia. SUBJECTIVE:  
Patient is fully awake today, transferred back to floor overnight, she is sitting in bed with her family today in the room. REVIEW OF SYSTEMS: Denies chest pain, abdominal pain, nausea or vomiting. No fever or chills. Stroke Work-up: 
 
 
Ct Head Wo Cont: Significant small vessel ischemic disease present. Cta Head Neck W Cont: 10/17/18 IMPRESSION: 1. No definite large vessel intracranial occlusion. Moderate stenosis suggested involving distal left MCA branches. 2.  No hemodynamically significant ICA stenosis, based on NASCET criteria. 3. No high-grade vertebral artery stenosis. 4. Incidentally seen acute pulmonary emboli greater on the right. Dilated main pulmonary artery compatible with pulmonary arterial hypertension. No large central saddle embolus is seen; heart incompletely visualized to evaluate for potential RV strain. 5. Additional partially visualized bibasilar airspace disease, nonspecific perhaps atelectasis, pneumonia, or aspiration. Partially visualized small bilateral pleural effusions. Echocardiogram:  
Lipid panel:  
Lab Results Component Value Date/Time Cholesterol, total 106 10/16/2018 05:04 AM  
 HDL Cholesterol 32 (L) 10/16/2018 05:04 AM  
 LDL, calculated 50.6 10/16/2018 05:04 AM  
 VLDL, calculated 23.4 10/16/2018 05:04 AM  
 Triglyceride 117 10/16/2018 05:04 AM  
 CHOL/HDL Ratio 3.3 10/16/2018 05:04 AM  
 
HbA1c:  
Lab Results Component Value Date/Time Hemoglobin A1c 5.8 (H) 10/16/2018 05:04 AM  
 
OBJECTIVE:  
  
Visit Maximo Hendricks /63 Pulse 71 Temp 98.4 °F (36.9 °C) Resp 19 Ht 5' 2\" (1.575 m) Wt 95.3 kg (210 lb) SpO2 90% BMI 38.41 kg/m² Physical Exam: 
GEN: Alert, NAD 
EYES: conjunctiva normal, lids with out lesions HEENT: MMM. HEART: RRR +S1 +S2 LUNGS: CTA B/L no rales or rhonchi. ABDOMEN: Soft, non-tender. EXTREMITIES: No edema cyanosis SKIN: no rashes or skin breakdown, no nodules NEURO: awake,  follow simple verbal commands, expressive aphasia Cranial nerves: right facial weakness. Left gaze preference. Extraocular movements are intact with no nystagmus. Visual fields are full to confrontation. PERRL. Tongue is midline. Palate elevates symmetrically. Hearing intact to speech. Sternocleidomastoid and trapezius strengths are full bilaterally. Motor:4+/5 right upper ane lower limbs muscle tone is reduced on right upper and lower limbs, Normal tone and normal bulk on left upper and lower limbs. Sensory:grossly normal both sides. Sensory neglect on right upper and lower limbs Coordination: deferred due to aphasia Deep tendon reflexes: 2+ at biceps, brachioradialis, patella and ankles bilaterally. Toes are  [place]. Gait assessment: deferred Current Facility-Administered Medications Medication Dose Route Frequency  HYDROcodone-acetaminophen (NORCO) 5-325 mg per tablet 1 Tab  1 Tab Oral Q6H PRN  perflutren lipid microspheres (DEFINITY) in NS bolus IV  1 mL IntraVENous RAD ONCE  
 famotidine (PF) (PEPCID) 20 mg in sodium chloride 0.9% 10 mL injection  20 mg IntraVENous DAILY  0.9% sodium chloride infusion  100 mL/hr IntraVENous CONTINUOUS  
 insulin lispro (HUMALOG) injection   SubCUTAneous AC&HS  aspirin delayed-release tablet 325 mg  325 mg Oral DAILY  sodium chloride (NS) flush 5-10 mL  5-10 mL IntraVENous PRN  
 sodium chloride (NS) flush 5-10 mL  5-10 mL IntraVENous Q8H  
 sodium chloride (NS) flush 5-10 mL  5-10 mL IntraVENous PRN  
 labetalol (NORMODYNE;TRANDATE) 20 mg/4 mL (5 mg/mL) injection 5 mg  5 mg IntraVENous Q10MIN PRN  
  heparin (porcine) injection 5,000 Units  5,000 Units SubCUTAneous Q8H  
 glucose chewable tablet 16 g  16 g Oral PRN  
 glucagon (GLUCAGEN) injection 1 mg  1 mg IntraMUSCular PRN  
 dextrose (D50W) injection syrg 25 g  50 mL IntraVENous PRN Laboratory Recent Results (from the past 24 hour(s)) GLUCOSE, POC Collection Time: 10/16/18  4:19 PM  
Result Value Ref Range Glucose (POC) 124 (H) 70 - 110 mg/dL GLUCOSE, POC Collection Time: 10/16/18  9:44 PM  
Result Value Ref Range Glucose (POC) 131 (H) 70 - 110 mg/dL METABOLIC PANEL, BASIC Collection Time: 10/17/18  5:26 AM  
Result Value Ref Range Sodium 145 136 - 145 mmol/L Potassium 4.2 3.5 - 5.5 mmol/L Chloride 111 (H) 100 - 108 mmol/L  
 CO2 27 21 - 32 mmol/L Anion gap 7 3.0 - 18 mmol/L Glucose 118 (H) 74 - 99 mg/dL BUN 11 7.0 - 18 MG/DL Creatinine 1.08 0.6 - 1.3 MG/DL  
 BUN/Creatinine ratio 10 (L) 12 - 20 GFR est AA 58 (L) >60 ml/min/1.73m2 GFR est non-AA 48 (L) >60 ml/min/1.73m2 Calcium 8.2 (L) 8.5 - 10.1 MG/DL  
CBC W/O DIFF Collection Time: 10/17/18  5:26 AM  
Result Value Ref Range WBC 6.1 4.6 - 13.2 K/uL  
 RBC 3.51 (L) 4.20 - 5.30 M/uL HGB 7.4 (L) 12.0 - 16.0 g/dL HCT 26.0 (L) 35.0 - 45.0 % MCV 74.1 74.0 - 97.0 FL  
 MCH 21.1 (L) 24.0 - 34.0 PG  
 MCHC 28.5 (L) 31.0 - 37.0 g/dL RDW 28.8 (H) 11.6 - 14.5 % PLATELET 251 427 - 085 K/uL MPV 9.3 9.2 - 11.8 FL  
GLUCOSE, POC Collection Time: 10/17/18  6:17 AM  
Result Value Ref Range Glucose (POC) 121 (H) 70 - 110 mg/dL Radiology: 
 
Ct Head Wo Cont10/15/2018 Significant small vessel ischemic disease present. Ct Head Wo Cont CTA head and neck 10/17/18: No definite large vessel intracranial occlusion. Moderate stenosis suggested  
involving distal left MCA branches. Incidentally seen acute pulmonary emboli greater on the right. Dilated main  
pulmonary artery compatible with pulmonary arterial hypertension.  No large central saddle embolus is seen; heart incompletely visualized to evaluate for  
potential RV strain. ASSESSMENT/IMPRESSION:  
81 YO AAF presents with sudden onset of aphasia, dysarthria, right sided weakness,mer neglect and aphasia. CT head shows periventricular microvascular changes 1. Acute ischemic stroke 2. Pulmonary embolism. 2. Encephalopathy: resolved, likely due to side effect of morphine. 3.Colon cancer status post surgery 4. SHAWN: resolved 
  
TPA was not given due to recent stroke, and GI surgery, she is also out of tpa window. She is not a candidate for thrombectomy.  
 
Please use stroke admission order sets. I discussed with patients family and the primary team, risk and the benefits to start anticoagulant treatment for pulmonary embolism. Patients family understand that there is risk of brain bleed to start anticoagulant after new onset ischemic stroke, but holding anticoagulant will also putter on risk to have diffuse pulmonary embolism. Im okay to start her with warfarin from neurologic standpoint, please do not use heparin drip if possible to avoid hemorrhagic conversion 1. Discontinue Aspirin if warfarin is started. 2. Brain MRI WO 
3. If there is acute neurologic changes, please repeat head CT. 4. Please make sure G.I. Surgery is okay before starting anticoagulant in the setting of recent colonoscopy. 5. Please limit narcotics or benzo if possible. Please give NSAIDs for moderate pain. 6. Permissive hypertension (do not treat if BP is not >200/110, prefer prn labetolol 5mg) 7. IV normal saline continuous infusion 100-125 ml/h 
8. Euglycemia with sliding scale insulin 9. Euthermia with acetaminophen 650mg Q6h prn for fever. 10. Continue PT/OT/ST 11.   
Addendum: 
10/17/18 19:18  
MRI brain was done and showed that there are several small foci of acute infarct involving the left frontal cortex, left insular cortex, left occipital lobe, and right cerebellar hemisphere. Probable additional small subacute punctate left frontal cortical infarcts.  No evidence of associated hemorrhage or mass effect. Given distribution, suspect central embolic origin. Clinical correlation is recommended. Since there is no massive cortical and subcortical stroke but punctuated embolic stroke, the risk of hemorrhagic conversion will be there but much smaller, it is ok to do heparin drip bridging if necessary per primary team. Please repeat CT head if there is neurologic changes.   
I will follow the patient. Please do not hesitate to return with any questions. Signed: Derik Vizcarra MD 
10/17/2018 12:05 PM 
 No

## 2021-12-19 NOTE — PROGRESS NOTES
Bedside and Verbal shift change report given to Waqas Amador RN (oncoming nurse) by Earl Peralta RN (offgoing nurse). Report included the following information SBAR, Procedure Summary, Intake/Output, MAR and Cardiac Rhythm sinus rhythm. no

## 2022-05-14 NOTE — ED NOTES
at bedside to get blood. Anion gap 14, Dr Dalton notified. Ordered to give 35 units of Lantus now, and stop IV insulin drip1 hour post administration of Lantus. OK for patient to eat food at that time.
